# Patient Record
Sex: MALE | Race: WHITE | NOT HISPANIC OR LATINO | Employment: OTHER | URBAN - METROPOLITAN AREA
[De-identification: names, ages, dates, MRNs, and addresses within clinical notes are randomized per-mention and may not be internally consistent; named-entity substitution may affect disease eponyms.]

---

## 2017-02-21 ENCOUNTER — HISTORICAL (OUTPATIENT)
Dept: ADMINISTRATIVE | Facility: HOSPITAL | Age: 72
End: 2017-02-21

## 2017-02-21 LAB
ALBUMIN SERPL-MCNC: 4 G/DL (ref 3.1–4.7)
ALP SERPL-CCNC: 51 IU/L (ref 40–104)
ALT (SGPT): 27 IU/L (ref 3–33)
AST SERPL-CCNC: 29 IU/L (ref 10–40)
BASOPHILS NFR BLD: 0.1 K/UL (ref 0–0.2)
BASOPHILS NFR BLD: 1 %
BILIRUB SERPL-MCNC: 0.6 MG/DL (ref 0.3–1)
BUN SERPL-MCNC: 16 MG/DL (ref 8–20)
CALCIUM SERPL-MCNC: 9.5 MG/DL (ref 7.7–10.4)
CEA: 3 NG/ML
CHLORIDE: 105 MMOL/L (ref 98–110)
CO2 SERPL-SCNC: 23.4 MMOL/L (ref 22.8–31.6)
CREATININE: 1.22 MG/DL (ref 0.6–1.4)
EOSINOPHIL NFR BLD: 0.2 K/UL (ref 0–0.7)
EOSINOPHIL NFR BLD: 3.3 %
ERYTHROCYTE [DISTWIDTH] IN BLOOD BY AUTOMATED COUNT: 12.2 % (ref 11.7–14.9)
GLUCOSE: 229 MG/DL (ref 70–99)
GRAN #: 3.6 K/UL (ref 1.4–6.5)
GRAN%: 59.1 %
HCT VFR BLD AUTO: 34.6 % (ref 39–55)
HGB BLD-MCNC: 11.7 G/DL (ref 14–16)
IMMATURE GRANS (ABS): 0 K/UL (ref 0–1)
IMMATURE GRANULOCYTES: 0.7 %
LYMPH #: 1.8 K/UL (ref 1.2–3.4)
LYMPH%: 29.8 %
MCH RBC QN AUTO: 31.6 PG (ref 25–35)
MCHC RBC AUTO-ENTMCNC: 33.8 G/DL (ref 31–36)
MCV RBC AUTO: 93.5 FL (ref 80–100)
MONO #: 0.4 K/UL (ref 0.1–0.6)
MONO%: 6.1 %
NUCLEATED RBCS: 0 %
PLATELET # BLD AUTO: 289 K/UL (ref 140–440)
PMV BLD AUTO: 9.3 FL (ref 8.8–12.7)
POTASSIUM SERPL-SCNC: 3.7 MMOL/L (ref 3.5–5)
PROT SERPL-MCNC: 6.8 G/DL (ref 6–8.2)
RBC # BLD AUTO: 3.7 M/UL (ref 4.3–5.9)
SODIUM: 139 MMOL/L (ref 134–144)
WBC # BLD AUTO: 6.1 K/UL (ref 5–10)

## 2017-03-16 LAB
% SATURATION: 28 %
CEA: 3.9 NG/ML
COMPLEXED PSA SERPL-MCNC: <0 NG/ML (ref 0–3)
FERRITIN SERPL-MCNC: 109 NG/ML (ref 37–201)
IRON: 82 MCG/DL (ref 32–176)
TOTAL IRON BINDING CAPACITY: 293 MCG/DL (ref 177–435)
VITAMIN B12: 316 PG/ML (ref 62–940)

## 2017-04-04 ENCOUNTER — HISTORICAL (OUTPATIENT)
Dept: ADMINISTRATIVE | Facility: HOSPITAL | Age: 72
End: 2017-04-04

## 2017-07-31 LAB
% SATURATION: 18 %
ALBUMIN SERPL-MCNC: 4.2 G/DL (ref 3.1–4.7)
ALP SERPL-CCNC: 50 IU/L (ref 40–104)
ALT (SGPT): 27 IU/L (ref 3–33)
AST SERPL-CCNC: 26 IU/L (ref 10–40)
BASOPHILS NFR BLD: 0.1 K/UL (ref 0–0.2)
BASOPHILS NFR BLD: 0.7 %
BILIRUB SERPL-MCNC: 0.4 MG/DL (ref 0.3–1)
BUN SERPL-MCNC: 21 MG/DL (ref 8–20)
CALCIUM SERPL-MCNC: 9.7 MG/DL (ref 7.7–10.4)
CHLORIDE: 101 MMOL/L (ref 98–110)
CO2 SERPL-SCNC: 25.2 MMOL/L (ref 22.8–31.6)
CREATININE: 1.25 MG/DL (ref 0.6–1.4)
EOSINOPHIL NFR BLD: 0.3 K/UL (ref 0–0.7)
EOSINOPHIL NFR BLD: 4.9 %
ERYTHROCYTE [DISTWIDTH] IN BLOOD BY AUTOMATED COUNT: 11.9 % (ref 11.7–14.9)
FERRITIN SERPL-MCNC: 90 NG/ML (ref 37–201)
GLUCOSE: 148 MG/DL (ref 70–99)
GRAN #: 2.6 K/UL (ref 1.4–6.5)
GRAN%: 39.2 %
HCT VFR BLD AUTO: 35 % (ref 39–55)
HGB BLD-MCNC: 11.9 G/DL (ref 14–16)
IMMATURE GRANS (ABS): 0 K/UL (ref 0–1)
IMMATURE GRANULOCYTES: 0.3 %
IRON: 54 MCG/DL (ref 32–176)
LYMPH #: 3.1 K/UL (ref 1.2–3.4)
LYMPH%: 46.7 %
MCH RBC QN AUTO: 31.6 PG (ref 25–35)
MCHC RBC AUTO-ENTMCNC: 34 G/DL (ref 31–36)
MCV RBC AUTO: 92.8 FL (ref 80–100)
MONO #: 0.6 K/UL (ref 0.1–0.6)
MONO%: 8.2 %
NUCLEATED RBCS: 0 %
PLATELET # BLD AUTO: 268 K/UL (ref 140–440)
PMV BLD AUTO: 9.8 FL (ref 8.8–12.7)
POTASSIUM SERPL-SCNC: 4.3 MMOL/L (ref 3.5–5)
PROT SERPL-MCNC: 6.9 G/DL (ref 6–8.2)
RBC # BLD AUTO: 3.77 M/UL (ref 4.3–5.9)
SODIUM: 136 MMOL/L (ref 134–144)
TOTAL IRON BINDING CAPACITY: 296 MCG/DL (ref 177–435)
WBC # BLD AUTO: 6.7 K/UL (ref 5–10)

## 2017-08-08 ENCOUNTER — OFFICE VISIT (OUTPATIENT)
Dept: HEMATOLOGY/ONCOLOGY | Facility: CLINIC | Age: 72
End: 2017-08-08
Payer: MEDICARE

## 2017-08-08 VITALS
TEMPERATURE: 98 F | HEART RATE: 97 BPM | BODY MASS INDEX: 35.17 KG/M2 | DIASTOLIC BLOOD PRESSURE: 74 MMHG | RESPIRATION RATE: 18 BRPM | HEIGHT: 60 IN | SYSTOLIC BLOOD PRESSURE: 109 MMHG | WEIGHT: 179.13 LBS

## 2017-08-08 DIAGNOSIS — E53.8 B12 DEFICIENCY: ICD-10-CM

## 2017-08-08 DIAGNOSIS — D50.9 IRON DEFICIENCY ANEMIA, UNSPECIFIED: ICD-10-CM

## 2017-08-08 DIAGNOSIS — Z85.038 HISTORY OF COLON CANCER: Primary | ICD-10-CM

## 2017-08-08 DIAGNOSIS — Z85.46 HISTORY OF PROSTATE CANCER: ICD-10-CM

## 2017-08-08 DIAGNOSIS — D63.8 ANEMIA OF OTHER CHRONIC DISEASE: ICD-10-CM

## 2017-08-08 PROCEDURE — 3008F BODY MASS INDEX DOCD: CPT | Mod: ,,, | Performed by: INTERNAL MEDICINE

## 2017-08-08 PROCEDURE — 99214 OFFICE O/P EST MOD 30 MIN: CPT | Mod: ,,, | Performed by: INTERNAL MEDICINE

## 2017-08-08 PROCEDURE — 1126F AMNT PAIN NOTED NONE PRSNT: CPT | Mod: ,,, | Performed by: INTERNAL MEDICINE

## 2017-08-08 PROCEDURE — 1159F MED LIST DOCD IN RCRD: CPT | Mod: ,,, | Performed by: INTERNAL MEDICINE

## 2017-08-08 RX ORDER — ATORVASTATIN CALCIUM 20 MG/1
20 TABLET, FILM COATED ORAL NIGHTLY
COMMUNITY
Start: 2017-07-30 | End: 2021-06-06

## 2017-08-08 NOTE — LETTER
August 8, 2017      Pascual Avalos MD  [address]  [place] [address]           Formerly [facility]  [address]  [address]  [place] [address]  Phone: [phone]  Fax: [phone]          Patient: Bret Garcia   MR Number: [id number]   YOB: 1945   Date of Visit: 8/8/2017       Dear Dr. Pascual Avalos:    Thank you for referring Bret Garcia to me for evaluation. Attached you will find relevant portions of my assessment and plan of care.    If you have questions, please do not hesitate to call me. I look forward to following Bret Garcia along with you.    Sincerely,    Robert Bonilla MD    Enclosure  CC:  No Recipients    If you would like to receive this communication electronically, please contact [email or web address] or [phone] to request more information on [employer] Link access.    For providers and/or their staff who would like to refer a patient to [facility], please contact us through our one-stop-shop provider referral line, [facility], at [phone].    If you feel you have received this communication in error or would no longer like to receive these types of communications, please e-mail [email or web address]

## 2017-08-08 NOTE — PROGRESS NOTES
[facility] Hematology/Oncology  PROGRESS NOTE      Subjective:       Patient ID:   NAME: Bret Garcia : 1945     71 y.o. male    Referring Doc: Pascual Avalos MD  Other Physicians: Richard, Carlitos Brown    Chief Complaint:  Colon ca f/u    History of Present Illness:     Patient returns today for a regularly scheduled follow-up visit.  The patient is doing ok. No new issues. He did not get the B12 or CEA levels done this past time because of costs. He denies any CP, SOB, HA's or N/V. Bowels normal.             ROS:   GEN: normal without any fever, night sweats or weight loss  HEENT: normal with no HA's, sore throat, stiff neck, changes in vision  CV: normal with no CP, SOB, PND, THURMAN or orthopnea  PULM: normal with no SOB, cough, hemoptysis, sputum or pleuritic pain  GI: normal with no abdominal pain, nausea, vomiting, constipation, diarrhea, melanotic stools, BRBPR, or hematemesis  : normal with no hematuria, dysuria  BREAST: normal with no mass, discharge, pain  SKIN: normal with no rash, erythema, bruising, or swelling    Allergies:  Review of patient's allergies indicates:   Allergen Reactions    Codeine Other (See Comments)     Other reaction(s): Rash  hallucinations    Penicillins Other (See Comments)     Other reaction(s): Shortness of breath  Other reaction(s): Itching  Unknown/as a child       Medications:    Current Outpatient Prescriptions:     atorvastatin (LIPITOR) 20 MG tablet, , Disp: , Rfl:     benazepril (LOTENSIN) 40 MG tablet, Take 40 mg by mouth once daily. , Disp: , Rfl:     BYDUREON 2 mg SSRR, Inject 2 mGy as directed once a week. , Disp: , Rfl: 1    clindamycin phosphate foam, APPLY 1 GM ON THE SKIN TWICE A DAY, Disp: , Rfl: 5    duloxetine (CYMBALTA) 30 MG capsule, Take 30 mg by mouth every morning. , Disp: , Rfl:     duloxetine (CYMBALTA) 60 MG capsule, Take 60 mg by mouth every evening.  , Disp: , Rfl:     levothyroxine (SYNTHROID) 75 MCG tablet, Take 1 tablet by mouth.,  "Disp: , Rfl:     metformin (GLUCOPHAGE-XR) 500 MG 24 hr tablet, TAKE 3 TABLETS BY MOUTH WITH DINNER EVERY DAY, Disp: , Rfl: 1    olanzapine (ZYPREXA) 5 MG tablet, Take 1 tablet by mouth., Disp: , Rfl:     omeprazole (PRILOSEC) 40 MG capsule, Take 40 mg by mouth once daily., Disp: , Rfl: 1    polyethylene glycol (GLYCOLAX) 17 gram/dose powder, TAKE 17 G EVERY DAY BY ORAL ROUTE FOR 30 DAYS., Disp: , Rfl: 3    pregabalin (LYRICA) 75 MG capsule, Take 75 mg by mouth every evening. , Disp: , Rfl:     trospium (SANCTURA XR) 60 mg Cp24 capsule, TAKE 1 CAPSULE BY MOUTH ONCE EVERY DAY, Disp: 90 capsule, Rfl: 0    esomeprazole (NEXIUM) 40 MG capsule, Take 40 mg by mouth once daily. , Disp: , Rfl:     ezetimibe (ZETIA) 10 mg tablet, Take 1 tablet by mouth., Disp: , Rfl:     ICAR-C 100-250 mg Tab, Take 1 tablet by mouth once daily., Disp: , Rfl: 3    simvastatin (ZOCOR) 40 MG tablet, Take by mouth. 1 Tablet Oral Every day, Disp: , Rfl:     PMHx/PSHx Updates:  See patient's last visit with me on 4/11/2017.  See H&P on Vol #1        Pathology:  See vol #1        Objective:     Vitals:  Blood pressure 109/74, pulse 97, temperature 98.4 °F (36.9 °C), resp. rate 18, height 5' 0.3" (1.532 m), weight 81.2 kg (179 lb 1.6 oz).    Physical Examination:   GEN: no apparent distress, comfortable; AAOx3  HEAD: atraumatic and normocephalic  EYES: no pallor, no icterus, PERRLA  ENT: OMM, no pharyngeal erythema, external ears WNL; no nasal discharge; no thrush  NECK: no masses, thyroid normal, trachea midline, no LAD/LN's, supple  CV: RRR with no murmur; normal pulse; normal S1 and S2; no pedal edema  CHEST: Normal respiratory effort; CTAB; normal breath sounds; no wheeze or crackles  ABDOM: nontender and nondistended; soft; normal bowel sounds; no rebound/guarding  MUSC/Skeletal: ROM normal; no crepitus; joints normal; no deformities or arthropathy  EXTREM: no clubbing, cyanosis, inflammation or swelling  SKIN: no rashes, lesions, " ulcers, petechiae or subcutaneous nodules  : no lawson  NEURO: grossly intact; motor/sensory WNL; AAOx3; no tremors  PSYCH: normal mood, affect and behavior  LYMPH: normal cervical, supraclavicular, axillary and groin LN's            Labs:     7/31/2017      Radiology/Diagnostic Studies:    No results found.    I have reviewed all available lab results and radiology reports.    Assessment/Plan:   (1) 71 y.o. male with diagnosis of colon cancer in 2005  - followed by Dr Ramos with GI  - prior partial SBO in Nov 2016      (2) Prior pseudotumor of lung - s/p resection   - followed by Dr Brown with Pulm    (3) Prior prostate CA - followed by Dr Urbina with     (4) Steatosis of liver with chronic LFT elevations - also followed by Dr Ramos with GI    (5) Multifactorial mild anemia - chronic disease, chronic renal and iron deficiency components  - latest hgb adequate at 11.9 and stable  - iron and ferritin good    (6) Mild B12 deficiency - on OTC B12    (7) CRI - followed by Dr Patton        PLAN:  1. Check CXR  2. Check CEA in Sept 2017  3. F/U with PCP, GI, Pulm and   4. RTC in 6 months  Fax note to Pascual Avalos MD, Pooja, Kevin Urbina, Richard    I spent over 25 mins with the patient, of which over half was face to face with the patient. Reviewing materials, labs, reports and studies. Making treatment and analytical decisions. Ordering necessary labs, tests and studies.      Discussion:     I have explained all of the above in detail and the patient understands all of the current recommendation(s). I have answered all of their questions to the best of my ability and to their complete satisfaction.   The patient is to continue with the current management plan.            Electronically signed by Robert Bonilla MD

## 2017-08-30 ENCOUNTER — TELEPHONE (OUTPATIENT)
Dept: UROLOGY | Facility: CLINIC | Age: 72
End: 2017-08-30

## 2017-08-30 DIAGNOSIS — Z80.42 FAMILY HX OF PROSTATE CANCER: Primary | ICD-10-CM

## 2017-08-30 DIAGNOSIS — C61 PROSTATE CANCER: ICD-10-CM

## 2017-08-30 NOTE — TELEPHONE ENCOUNTER
----- Message from Leslie Lovell sent at 8/30/2017  1:46 PM CDT -----  Contact: patient  Patient calling to request a lab order for a PSA to be put in. He is scheduled for his annual on 10/2/17. Please advise.  Call back .  Thanks!

## 2017-09-26 ENCOUNTER — LAB VISIT (OUTPATIENT)
Dept: LAB | Facility: HOSPITAL | Age: 72
End: 2017-09-26
Attending: UROLOGY
Payer: MEDICARE

## 2017-09-26 DIAGNOSIS — Z80.42 FAMILY HX OF PROSTATE CANCER: ICD-10-CM

## 2017-09-26 DIAGNOSIS — C61 PROSTATE CANCER: ICD-10-CM

## 2017-09-26 LAB — COMPLEXED PSA SERPL-MCNC: <0.01 NG/ML

## 2017-09-26 PROCEDURE — 36415 COLL VENOUS BLD VENIPUNCTURE: CPT

## 2017-09-26 PROCEDURE — 84153 ASSAY OF PSA TOTAL: CPT

## 2017-10-02 ENCOUNTER — OFFICE VISIT (OUTPATIENT)
Dept: UROLOGY | Facility: CLINIC | Age: 72
End: 2017-10-02
Payer: MEDICARE

## 2017-10-02 VITALS
TEMPERATURE: 98 F | WEIGHT: 176 LBS | BODY MASS INDEX: 30.05 KG/M2 | HEART RATE: 108 BPM | HEIGHT: 64 IN | SYSTOLIC BLOOD PRESSURE: 128 MMHG | DIASTOLIC BLOOD PRESSURE: 87 MMHG

## 2017-10-02 DIAGNOSIS — C61 PROSTATE CANCER: Primary | ICD-10-CM

## 2017-10-02 DIAGNOSIS — N39.45 CONTINUOUS LEAKAGE OF URINE: ICD-10-CM

## 2017-10-02 LAB
BILIRUB SERPL-MCNC: NORMAL MG/DL
BLOOD URINE, POC: NORMAL
COLOR, POC UA: NORMAL
GLUCOSE UR QL STRIP: NORMAL
KETONES UR QL STRIP: NORMAL
LEUKOCYTE ESTERASE URINE, POC: NORMAL
NITRITE, POC UA: NORMAL
PH, POC UA: 6
PROTEIN, POC: NORMAL
SPECIFIC GRAVITY, POC UA: 1
UROBILINOGEN, POC UA: NORMAL

## 2017-10-02 PROCEDURE — 99214 OFFICE O/P EST MOD 30 MIN: CPT | Mod: S$PBB,,, | Performed by: UROLOGY

## 2017-10-02 PROCEDURE — 99999 PR PBB SHADOW E&M-EST. PATIENT-LVL III: CPT | Mod: PBBFAC,,, | Performed by: UROLOGY

## 2017-10-02 PROCEDURE — 99213 OFFICE O/P EST LOW 20 MIN: CPT | Mod: PBBFAC,PN | Performed by: UROLOGY

## 2017-10-02 PROCEDURE — 81002 URINALYSIS NONAUTO W/O SCOPE: CPT | Mod: PBBFAC,PN | Performed by: UROLOGY

## 2017-10-02 NOTE — PROGRESS NOTES
Subjective:       Patient ID: Bret Garcia is a 72 y.o. male.    Chief Complaint:   OFFICE NOTE    CHIEF COMPLAINT:  1.  Prostate cancer.  2.  Urinary incontinence.    Mr. Garcia is a 72-year-old male who underwent a radical retropubic   prostatectomy in 2006.  Since then, the PSA has been very well and the last PSA   on 09/26/2017 was 0.01.  He did develop urinary incontinence, and in 2011, he   underwent the insertion of an artificial urinary sphincter.  The patient after   the insertion of a urinary sphincter without activating the device has very   minimal urinary incontinence.  For that reason, we never activated the sphincter   and he was urinating fairly well with minimally stress incontinence.  It is   lately the patient has developed more urinary incontinence, and now he used 2-3   pads in a day.  He refers to have nocturia x2.  No dysuria or hematuria, and the   flow seemed to be adequate with the sensation that he can empty the bladder   satisfactory.  I explained to the patient that probably it is time to activate   the artificial urinary sphincter and hopefully that will give him complete   dryness.  I went ahead and did the activation and teach the patient the proper   use of the sphincter and the warning that he needs to be aware that no catheter   can be placed before opening the sphincter.  He understood and he was happy to   know that he will have a better bladder control now that the sphincter is   activated.    FAMILY HISTORY:  Negative for prostate cancer.    PAST MEDICAL AND SURGICAL HISTORY:  Have changed.  He was admitted to the   hospital earlier this year because of pain in an umbilical hernia area.  Several   tests was performed and no reason for the pain was found and he was eventually   discharged and the pain did resolve.  As noted before, the last PSA on   09/26/2017 was 0.01.  The urinalysis is completely clear.      EOR/HN  dd: 10/02/2017 16:42:22 (CDT)  td: 10/03/2017 01:47:05  (CDT)  Doc ID   #[id number]  Job ID #[id number]    CC:       HPI  Review of Systems   Constitutional: Negative for activity change and appetite change.   HENT: Negative.    Eyes: Negative for discharge.   Respiratory: Negative for cough and shortness of breath.    Cardiovascular: Negative for chest pain and palpitations.   Gastrointestinal: Negative for abdominal distention, abdominal pain, constipation and vomiting.   Genitourinary: Negative for discharge, dysuria, flank pain, frequency, hematuria, testicular pain and urgency.        Incontinence with the need of 2 to 3 pads a day.   Musculoskeletal: Negative for arthralgias.   Skin: Negative for rash.   Neurological: Negative for dizziness.   Psychiatric/Behavioral: The patient is not nervous/anxious.        Objective:      Physical Exam   Constitutional: He appears well-developed and well-nourished.   HENT:   Head: Normocephalic.   Eyes: Pupils are equal, round, and reactive to light.   Neck: Normal range of motion.   Cardiovascular: Normal rate.    Pulmonary/Chest: Effort normal.   Abdominal: Soft. He exhibits no distension and no mass. There is no tenderness. Hernia confirmed negative in the right inguinal area and confirmed negative in the left inguinal area.   Genitourinary: Rectum normal and penis normal. Rectal exam shows no external hemorrhoid, no mass and no tenderness. Right testis shows no mass and no tenderness. Left testis shows no mass and no tenderness. No discharge found.             Musculoskeletal: Normal range of motion.   Neurological: He is alert.   Skin: Skin is warm.     Psychiatric: He has a normal mood and affect.       Assessment:       1. Prostate cancer    2. Continuous leakage of urine        Plan:       Prostate cancer  -     POCT URINE DIPSTICK WITHOUT MICROSCOPE    Continuous leakage of urine    If no problems with the AUS RTC 1 yr.

## 2017-10-20 RX ORDER — TROSPIUM CHLORIDE ER 60 MG/1
60 CAPSULE ORAL DAILY
Qty: 90 CAPSULE | Refills: 3 | Status: SHIPPED | OUTPATIENT
Start: 2017-10-20 | End: 2018-01-09 | Stop reason: ALTCHOICE

## 2017-11-07 ENCOUNTER — HOSPITAL ENCOUNTER (INPATIENT)
Facility: HOSPITAL | Age: 72
LOS: 4 days | Discharge: HOME | DRG: 394 | End: 2017-11-12
Attending: INTERNAL MEDICINE | Admitting: INTERNAL MEDICINE
Payer: MEDICARE

## 2017-11-07 DIAGNOSIS — I10 HYPERTENSION, UNSPECIFIED TYPE: ICD-10-CM

## 2017-11-07 DIAGNOSIS — K56.609 SBO (SMALL BOWEL OBSTRUCTION): ICD-10-CM

## 2017-11-07 DIAGNOSIS — E11.8 TYPE 2 DIABETES MELLITUS WITH COMPLICATION, UNSPECIFIED LONG TERM INSULIN USE STATUS: ICD-10-CM

## 2017-11-07 DIAGNOSIS — Z85.46 HISTORY OF PROSTATE CANCER: ICD-10-CM

## 2017-11-07 DIAGNOSIS — Z87.19 HISTORY OF SMALL BOWEL OBSTRUCTION: ICD-10-CM

## 2017-11-07 PROBLEM — E11.9 DIABETES MELLITUS: Status: ACTIVE | Noted: 2017-11-07

## 2017-11-07 LAB
ALBUMIN SERPL BCP-MCNC: 3.8 G/DL
ALP SERPL-CCNC: 60 U/L
ALT SERPL W/O P-5'-P-CCNC: 15 U/L
AMYLASE SERPL-CCNC: 24 U/L
ANION GAP SERPL CALC-SCNC: 14 MMOL/L
AST SERPL-CCNC: 20 U/L
BASOPHILS # BLD AUTO: 0 K/UL
BASOPHILS NFR BLD: 0.2 %
BILIRUB SERPL-MCNC: 0.6 MG/DL
BILIRUB UR QL STRIP: NEGATIVE
BUN SERPL-MCNC: 40 MG/DL
CALCIUM SERPL-MCNC: 9.9 MG/DL
CHLORIDE SERPL-SCNC: 96 MMOL/L
CLARITY UR: CLEAR
CO2 SERPL-SCNC: 28 MMOL/L
COLOR UR: YELLOW
CREAT SERPL-MCNC: 1.8 MG/DL
DIFFERENTIAL METHOD: ABNORMAL
EOSINOPHIL # BLD AUTO: 0 K/UL
EOSINOPHIL NFR BLD: 0.3 %
ERYTHROCYTE [DISTWIDTH] IN BLOOD BY AUTOMATED COUNT: 12.9 %
EST. GFR  (AFRICAN AMERICAN): 43 ML/MIN/1.73 M^2
EST. GFR  (NON AFRICAN AMERICAN): 37 ML/MIN/1.73 M^2
GLUCOSE SERPL-MCNC: 154 MG/DL
GLUCOSE UR QL STRIP: NEGATIVE
HCT VFR BLD AUTO: 39.3 %
HGB BLD-MCNC: 13.2 G/DL
HGB UR QL STRIP: NEGATIVE
KETONES UR QL STRIP: NEGATIVE
LEUKOCYTE ESTERASE UR QL STRIP: NEGATIVE
LYMPHOCYTES # BLD AUTO: 1.4 K/UL
LYMPHOCYTES NFR BLD: 23.9 %
MAGNESIUM SERPL-MCNC: 1.9 MG/DL
MCH RBC QN AUTO: 31.3 PG
MCHC RBC AUTO-ENTMCNC: 33.6 G/DL
MCV RBC AUTO: 93 FL
MONOCYTES # BLD AUTO: 0.9 K/UL
MONOCYTES NFR BLD: 15.1 %
NEUTROPHILS # BLD AUTO: 3.7 K/UL
NEUTROPHILS NFR BLD: 60.5 %
NITRITE UR QL STRIP: NEGATIVE
PH UR STRIP: 6 [PH] (ref 5–8)
PHOSPHATE SERPL-MCNC: 3.5 MG/DL
PLATELET # BLD AUTO: 300 K/UL
PMV BLD AUTO: 8 FL
POCT GLUCOSE: 170 MG/DL (ref 70–110)
POTASSIUM SERPL-SCNC: 5 MMOL/L
PROT SERPL-MCNC: 7.5 G/DL
PROT UR QL STRIP: ABNORMAL
RBC # BLD AUTO: 4.22 M/UL
SODIUM SERPL-SCNC: 138 MMOL/L
SP GR UR STRIP: >=1.03 (ref 1–1.03)
URN SPEC COLLECT METH UR: ABNORMAL
UROBILINOGEN UR STRIP-ACNC: NEGATIVE EU/DL
WBC # BLD AUTO: 6.1 K/UL

## 2017-11-07 PROCEDURE — 96361 HYDRATE IV INFUSION ADD-ON: CPT

## 2017-11-07 PROCEDURE — G0379 DIRECT REFER HOSPITAL OBSERV: HCPCS

## 2017-11-07 PROCEDURE — 80053 COMPREHEN METABOLIC PANEL: CPT

## 2017-11-07 PROCEDURE — 85025 COMPLETE CBC W/AUTO DIFF WBC: CPT

## 2017-11-07 PROCEDURE — 99219 PR INITIAL OBSERVATION CARE,LEVL II: CPT | Mod: ,,, | Performed by: INTERNAL MEDICINE

## 2017-11-07 PROCEDURE — 81003 URINALYSIS AUTO W/O SCOPE: CPT

## 2017-11-07 PROCEDURE — 82962 GLUCOSE BLOOD TEST: CPT

## 2017-11-07 PROCEDURE — 82150 ASSAY OF AMYLASE: CPT

## 2017-11-07 PROCEDURE — G0378 HOSPITAL OBSERVATION PER HR: HCPCS

## 2017-11-07 PROCEDURE — 25000003 PHARM REV CODE 250: Performed by: INTERNAL MEDICINE

## 2017-11-07 PROCEDURE — 84100 ASSAY OF PHOSPHORUS: CPT

## 2017-11-07 PROCEDURE — 63600175 PHARM REV CODE 636 W HCPCS: Performed by: INTERNAL MEDICINE

## 2017-11-07 PROCEDURE — 96360 HYDRATION IV INFUSION INIT: CPT

## 2017-11-07 PROCEDURE — 36415 COLL VENOUS BLD VENIPUNCTURE: CPT

## 2017-11-07 PROCEDURE — 96372 THER/PROPH/DIAG INJ SC/IM: CPT

## 2017-11-07 PROCEDURE — 83735 ASSAY OF MAGNESIUM: CPT

## 2017-11-07 RX ORDER — LANOLIN ALCOHOL/MO/W.PET/CERES
800 CREAM (GRAM) TOPICAL
Status: DISCONTINUED | OUTPATIENT
Start: 2017-11-07 | End: 2017-11-12 | Stop reason: HOSPADM

## 2017-11-07 RX ORDER — ENOXAPARIN SODIUM 100 MG/ML
40 INJECTION SUBCUTANEOUS EVERY 24 HOURS
Status: DISCONTINUED | OUTPATIENT
Start: 2017-11-07 | End: 2017-11-12 | Stop reason: HOSPADM

## 2017-11-07 RX ORDER — OLANZAPINE 5 MG/1
5 TABLET ORAL DAILY
Status: DISCONTINUED | OUTPATIENT
Start: 2017-11-08 | End: 2017-11-12 | Stop reason: HOSPADM

## 2017-11-07 RX ORDER — POTASSIUM CHLORIDE 20 MEQ/15ML
40 SOLUTION ORAL
Status: DISCONTINUED | OUTPATIENT
Start: 2017-11-07 | End: 2017-11-12 | Stop reason: HOSPADM

## 2017-11-07 RX ORDER — GLUCAGON 1 MG
1 KIT INJECTION
Status: DISCONTINUED | OUTPATIENT
Start: 2017-11-07 | End: 2017-11-07 | Stop reason: SDUPTHER

## 2017-11-07 RX ORDER — SODIUM,POTASSIUM PHOSPHATES 280-250MG
2 POWDER IN PACKET (EA) ORAL
Status: DISCONTINUED | OUTPATIENT
Start: 2017-11-07 | End: 2017-11-12 | Stop reason: HOSPADM

## 2017-11-07 RX ORDER — DULOXETIN HYDROCHLORIDE 30 MG/1
60 CAPSULE, DELAYED RELEASE ORAL NIGHTLY
Status: DISCONTINUED | OUTPATIENT
Start: 2017-11-07 | End: 2017-11-12 | Stop reason: HOSPADM

## 2017-11-07 RX ORDER — ONDANSETRON 2 MG/ML
8 INJECTION INTRAMUSCULAR; INTRAVENOUS EVERY 6 HOURS PRN
Status: DISCONTINUED | OUTPATIENT
Start: 2017-11-07 | End: 2017-11-12 | Stop reason: HOSPADM

## 2017-11-07 RX ORDER — PANTOPRAZOLE SODIUM 40 MG/10ML
40 INJECTION, POWDER, LYOPHILIZED, FOR SOLUTION INTRAVENOUS DAILY
Status: DISCONTINUED | OUTPATIENT
Start: 2017-11-08 | End: 2017-11-11

## 2017-11-07 RX ORDER — SODIUM CHLORIDE 450 MG/100ML
INJECTION, SOLUTION INTRAVENOUS CONTINUOUS
Status: DISCONTINUED | OUTPATIENT
Start: 2017-11-07 | End: 2017-11-10

## 2017-11-07 RX ORDER — INSULIN ASPART 100 [IU]/ML
0-5 INJECTION, SOLUTION INTRAVENOUS; SUBCUTANEOUS EVERY 6 HOURS PRN
Status: DISCONTINUED | OUTPATIENT
Start: 2017-11-07 | End: 2017-11-12 | Stop reason: HOSPADM

## 2017-11-07 RX ORDER — IBUPROFEN 200 MG
16 TABLET ORAL
Status: DISCONTINUED | OUTPATIENT
Start: 2017-11-07 | End: 2017-11-12 | Stop reason: HOSPADM

## 2017-11-07 RX ORDER — IBUPROFEN 200 MG
24 TABLET ORAL
Status: DISCONTINUED | OUTPATIENT
Start: 2017-11-07 | End: 2017-11-12 | Stop reason: HOSPADM

## 2017-11-07 RX ORDER — ACETAMINOPHEN 325 MG/1
650 TABLET ORAL EVERY 8 HOURS PRN
Status: DISCONTINUED | OUTPATIENT
Start: 2017-11-07 | End: 2017-11-12 | Stop reason: HOSPADM

## 2017-11-07 RX ORDER — POTASSIUM CHLORIDE 20 MEQ/15ML
60 SOLUTION ORAL
Status: DISCONTINUED | OUTPATIENT
Start: 2017-11-07 | End: 2017-11-12 | Stop reason: HOSPADM

## 2017-11-07 RX ORDER — BISACODYL 10 MG
10 SUPPOSITORY, RECTAL RECTAL DAILY PRN
Status: DISCONTINUED | OUTPATIENT
Start: 2017-11-07 | End: 2017-11-12 | Stop reason: HOSPADM

## 2017-11-07 RX ORDER — GLUCAGON 1 MG
1 KIT INJECTION
Status: DISCONTINUED | OUTPATIENT
Start: 2017-11-07 | End: 2017-11-12 | Stop reason: HOSPADM

## 2017-11-07 RX ORDER — OXYBUTYNIN CHLORIDE 5 MG/1
5 TABLET, EXTENDED RELEASE ORAL DAILY
Status: DISCONTINUED | OUTPATIENT
Start: 2017-11-08 | End: 2017-11-12 | Stop reason: HOSPADM

## 2017-11-07 RX ADMIN — ENOXAPARIN SODIUM 40 MG: 100 INJECTION SUBCUTANEOUS at 07:11

## 2017-11-07 RX ADMIN — DULOXETINE HYDROCHLORIDE 60 MG: 30 CAPSULE, DELAYED RELEASE ORAL at 09:11

## 2017-11-07 RX ADMIN — SODIUM CHLORIDE: 0.45 INJECTION, SOLUTION INTRAVENOUS at 07:11

## 2017-11-07 NOTE — H&P
PCP: Pascual Avalos MD    History & Physical    Chief Complaint: SBO    History of Present Illness:  Patient is a 72 y.o. male admitted to [facility] from [facility] with complaint of SBO. Patient reportedly has past medical history significant for hypertension, hyperlipidemia, GERD, DM-2, colon polyp and history of Prostate and colon cancer s/p colectomy (2005). Patient also has prior history of SBO (). Last colonoscopy by Dr. Ramos in . Patient presented with 2 day history of abdominal distention, associated nausea and vomited twice. Patient denied any abdominal pain, hematemesis or melena. Last BM was yesterday. Patient denied chest pain, shortness of breath, headache, vision changes, focal neuro-deficits, cough or fever.    Past Medical History:   Diagnosis Date    Colon polyp     Diabetes mellitus     Elevated PSA     GERD (gastroesophageal reflux disease)     Hyperlipidemia     Hypertension     Incontinence of urine     Neuropathy     Personal history of malignant neoplasm of large intestine     colon; prostate    Personal history of malignant neoplasm of prostate      Past Surgical History:   Procedure Laterality Date    APPENDECTOMY      COLON SURGERY  2005    resection    PROSTATE SURGERY  2006    prostatectomy    ROTATOR CUFF REPAIR      Rt shoulder    TONSILLECTOMY, ADENOIDECTOMY      as a baby     Family History   Problem Relation Age of Onset    Heart disease Father     Alcohol abuse Father      Social History   Substance Use Topics    Smoking status: Never Smoker    Smokeless tobacco: Never Used    Alcohol use No      Review of patient's allergies indicates:   Allergen Reactions    Codeine Other (See Comments)     Other reaction(s): Rash  hallucinations    Penicillins Other (See Comments)     Other reaction(s): Shortness of breath  Other reaction(s): Itching  Unknown/as a child     PTA Medications   Medication Sig    atorvastatin  (LIPITOR) 20 MG tablet     benazepril (LOTENSIN) 40 MG tablet Take 40 mg by mouth once daily.     BYDUREON 2 mg SSRR Inject 2 mGy as directed once a week.     duloxetine (CYMBALTA) 30 MG capsule Take 30 mg by mouth every morning.     duloxetine (CYMBALTA) 60 MG capsule Take 60 mg by mouth every evening.      esomeprazole (NEXIUM) 40 MG capsule Take 40 mg by mouth once daily.     levothyroxine (SYNTHROID) 75 MCG tablet Take 1 tablet by mouth.    metformin (GLUCOPHAGE-XR) 500 MG 24 hr tablet TAKE 3 TABLETS BY MOUTH WITH DINNER EVERY DAY    olanzapine (ZYPREXA) 5 MG tablet Take 1 tablet by mouth.    omeprazole (PRILOSEC) 40 MG capsule Take 40 mg by mouth once daily.    pregabalin (LYRICA) 75 MG capsule Take 75 mg by mouth every evening.     trospium (SANCTURA XR) 60 mg Cp24 capsule Take 1 capsule (60 mg total) by mouth once daily.    clindamycin phosphate foam APPLY 1 GM ON THE SKIN TWICE A DAY    ezetimibe (ZETIA) 10 mg tablet Take 1 tablet by mouth.    ICAR-C 100-250 mg Tab Take 1 tablet by mouth once daily.    polyethylene glycol (GLYCOLAX) 17 gram/dose powder TAKE 17 G EVERY DAY BY ORAL ROUTE FOR 30 DAYS.    simvastatin (ZOCOR) 40 MG tablet Take by mouth. 1 Tablet Oral Every day     Review of Systems:  Constitutional: no fever or chills  Eyes: no visual changes  Ears, nose, mouth, throat, and face: no nasal congestion or sore throat  Respiratory: no cough or shorness of breath  Cardiovascular: no chest pain or palpitations  Gastrointestinal: see HPI  Genitourinary: no hematuria or dysuria  Integument/breast: no rash or pruritis  Hematologic/lymphatic: no easy bruising or lymphadenopathy  Musculoskeletal: no arthralgias or myalgias  Neurological: no seizures or tremors.  Behavioral/Psych: no auditory or visual hallucinations  Endocrine: no heat or cold intolerance     OBJECTIVE:     Vital Signs (Most Recent)  Temp: 97.8 °F (36.6 °C) (11/07/17 1515)  Pulse: (!) 116 (11/07/17 1515)  Resp: 18 (11/07/17  1515)  BP: 110/80 (11/07/17 1515)  SpO2: (!) 94 % (11/07/17 1515)    Physical Exam:  General appearance: well developed, appears stated age  Head: normocephalic, atraumatic  Eyes:  conjunctivae/corneas clear. PERRL.  Nose: Nares normal. Septum midline.  Throat: lips, mucosa, and tongue normal; teeth and gums normal, no throat erythema.  Neck: supple, symmetrical, trachea midline, no JVD and thyroid not enlarged, symmetric, no tenderness/mass/nodules  Lungs:  clear to auscultation bilaterally and normal respiratory effort  Chest wall: no tenderness  Heart: regular rate and rhythm, S1, S2 normal, no murmur, click, rub or gallop  Abdomen: soft, non-tender, distented; bowel sounds hypoactive; no masses,  no organomegaly  Extremities: no cyanosis, clubbing or edema.   Pulses: 2+ and symmetric  Skin: Skin color, texture, turgor normal. No rashes or lesions.  Lymph nodes: Cervical, supraclavicular, and axillary nodes normal.  Neurologic: Normal strength and tone. No focal numbness or weakness. CNII-XII intact.      Laboratory:   CBC: No results for input(s): WBC, RBC, HGB, HCT, PLT, MCV, MCH, MCHC in the last 168 hours.  CMP: No results for input(s): GLU, CALCIUM, ALBUMIN, PROT, NA, K, CO2, CL, BUN, CREATININE, ALKPHOS, ALT, AST, BILITOT in the last 168 hours.  Coagulation: No results for input(s): LABPROT, INR, APTT in the last 168 hours.  Diagnostic Results:  Chest X-Ray:     Assessment/Plan:     Active Hospital Problems    Diagnosis  POA    *SBO (small bowel obstruction) [K56.609]  History of small bowel obstruction [Z87.19]  Patient deferred NGT with LIS.  Repeat KUB x-ray in AM.  Consult General Surgeon specialist.  Continue IVF hydration. Follow serum lytes.   Use IV anti-emetics as needed.      Yes    Diabetes mellitus 2 [E11.9]  Check blood glucose level q A6h  Use Novolog Insulin Sliding Scale as needed.     Yes    Hypertension [I10]  Chronic problem. Will continue chronic medications and monitor for any  changes, adjusting as needed.    Yes          History of prostate cancer [Z85.46]  Not Applicable    History of colon cancer [Z85.038]  Under care by Dr. Bonilla.   Yes        VTE Risk Mitigation         Ordered     enoxaparin injection 40 mg  Daily     Route:  Subcutaneous        11/07/17 1644     Medium Risk of VTE  Once      11/07/17 1644        Jaimee Franks MD  Department of Hospital Medicine   [facility]

## 2017-11-08 PROBLEM — N17.9 AKI (ACUTE KIDNEY INJURY): Status: ACTIVE | Noted: 2017-11-08

## 2017-11-08 LAB
BASOPHILS # BLD AUTO: 0 K/UL
BASOPHILS NFR BLD: 0.3 %
DIFFERENTIAL METHOD: ABNORMAL
EOSINOPHIL # BLD AUTO: 0 K/UL
EOSINOPHIL NFR BLD: 0.7 %
ERYTHROCYTE [DISTWIDTH] IN BLOOD BY AUTOMATED COUNT: 13.1 %
HCT VFR BLD AUTO: 35.1 %
HGB BLD-MCNC: 11.9 G/DL
LIPASE SERPL-CCNC: 25 U/L
LYMPHOCYTES # BLD AUTO: 1.8 K/UL
LYMPHOCYTES NFR BLD: 33.9 %
MCH RBC QN AUTO: 31.4 PG
MCHC RBC AUTO-ENTMCNC: 34 G/DL
MCV RBC AUTO: 92 FL
MONOCYTES # BLD AUTO: 0.8 K/UL
MONOCYTES NFR BLD: 14 %
NEUTROPHILS # BLD AUTO: 2.8 K/UL
NEUTROPHILS NFR BLD: 51.1 %
PLATELET # BLD AUTO: 279 K/UL
PMV BLD AUTO: 7.9 FL
POCT GLUCOSE: 118 MG/DL (ref 70–110)
POCT GLUCOSE: 155 MG/DL (ref 70–110)
RBC # BLD AUTO: 3.8 M/UL
WBC # BLD AUTO: 5.4 K/UL

## 2017-11-08 PROCEDURE — 99223 1ST HOSP IP/OBS HIGH 75: CPT | Mod: ,,, | Performed by: SURGERY

## 2017-11-08 PROCEDURE — 83690 ASSAY OF LIPASE: CPT

## 2017-11-08 PROCEDURE — 63600175 PHARM REV CODE 636 W HCPCS: Performed by: INTERNAL MEDICINE

## 2017-11-08 PROCEDURE — C9113 INJ PANTOPRAZOLE SODIUM, VIA: HCPCS | Performed by: INTERNAL MEDICINE

## 2017-11-08 PROCEDURE — 25000003 PHARM REV CODE 250: Performed by: INTERNAL MEDICINE

## 2017-11-08 PROCEDURE — 85025 COMPLETE CBC W/AUTO DIFF WBC: CPT

## 2017-11-08 PROCEDURE — 25500020 PHARM REV CODE 255: Performed by: INTERNAL MEDICINE

## 2017-11-08 PROCEDURE — 99232 SBSQ HOSP IP/OBS MODERATE 35: CPT | Mod: ,,, | Performed by: INTERNAL MEDICINE

## 2017-11-08 PROCEDURE — 12000002 HC ACUTE/MED SURGE SEMI-PRIVATE ROOM

## 2017-11-08 PROCEDURE — 96374 THER/PROPH/DIAG INJ IV PUSH: CPT

## 2017-11-08 PROCEDURE — 36415 COLL VENOUS BLD VENIPUNCTURE: CPT

## 2017-11-08 RX ADMIN — SODIUM CHLORIDE: 0.45 INJECTION, SOLUTION INTRAVENOUS at 04:11

## 2017-11-08 RX ADMIN — OXYBUTYNIN CHLORIDE 5 MG: 5 TABLET, EXTENDED RELEASE ORAL at 08:11

## 2017-11-08 RX ADMIN — OLANZAPINE 5 MG: 5 TABLET, FILM COATED ORAL at 08:11

## 2017-11-08 RX ADMIN — DULOXETINE HYDROCHLORIDE 60 MG: 30 CAPSULE, DELAYED RELEASE ORAL at 09:11

## 2017-11-08 RX ADMIN — ENOXAPARIN SODIUM 40 MG: 100 INJECTION SUBCUTANEOUS at 04:11

## 2017-11-08 RX ADMIN — PANTOPRAZOLE SODIUM 40 MG: 40 INJECTION, POWDER, FOR SOLUTION INTRAVENOUS at 08:11

## 2017-11-08 RX ADMIN — IOHEXOL 30 ML: 350 INJECTION, SOLUTION INTRAVENOUS at 10:11

## 2017-11-08 NOTE — PLAN OF CARE
Problem: Patient Care Overview  Goal: Plan of Care Review  Outcome: Ongoing (interventions implemented as appropriate)  Pt alert and oriented. NAD noted.NPO. VS obtained. 0.4NS @ 100 ml/hr.urinal at bedside. No BM this shift. BG monitored q6hrs. Up at all. Denies pain, n/v. Free of fall or injury. Call light in reach. Will continue to monitor.

## 2017-11-08 NOTE — SUBJECTIVE & OBJECTIVE
No current facility-administered medications on file prior to encounter.      Current Outpatient Prescriptions on File Prior to Encounter   Medication Sig    atorvastatin (LIPITOR) 20 MG tablet     benazepril (LOTENSIN) 40 MG tablet Take 40 mg by mouth once daily.     BYDUREON 2 mg SSRR Inject 2 mGy as directed once a week.     duloxetine (CYMBALTA) 30 MG capsule Take 30 mg by mouth every morning.     duloxetine (CYMBALTA) 60 MG capsule Take 60 mg by mouth every evening.      esomeprazole (NEXIUM) 40 MG capsule Take 40 mg by mouth once daily.     levothyroxine (SYNTHROID) 75 MCG tablet Take 1 tablet by mouth.    metformin (GLUCOPHAGE-XR) 500 MG 24 hr tablet TAKE 3 TABLETS BY MOUTH WITH DINNER EVERY DAY    olanzapine (ZYPREXA) 5 MG tablet Take 1 tablet by mouth.    omeprazole (PRILOSEC) 40 MG capsule Take 40 mg by mouth once daily.    pregabalin (LYRICA) 75 MG capsule Take 75 mg by mouth every evening.     trospium (SANCTURA XR) 60 mg Cp24 capsule Take 1 capsule (60 mg total) by mouth once daily.    clindamycin phosphate foam APPLY 1 GM ON THE SKIN TWICE A DAY    ezetimibe (ZETIA) 10 mg tablet Take 1 tablet by mouth.    ICAR-C 100-250 mg Tab Take 1 tablet by mouth once daily.    polyethylene glycol (GLYCOLAX) 17 gram/dose powder TAKE 17 G EVERY DAY BY ORAL ROUTE FOR 30 DAYS.    simvastatin (ZOCOR) 40 MG tablet Take by mouth. 1 Tablet Oral Every day       Review of patient's allergies indicates:   Allergen Reactions    Codeine Other (See Comments)     Other reaction(s): Rash  hallucinations    Penicillins Other (See Comments)     Other reaction(s): Shortness of breath  Other reaction(s): Itching  Unknown/as a child       Past Medical History:   Diagnosis Date    Colon polyp     Diabetes mellitus     Elevated PSA     GERD (gastroesophageal reflux disease)     Hyperlipidemia     Hypertension     Incontinence of urine     Neuropathy     Personal history of malignant neoplasm of large intestine      colon; prostate    Personal history of malignant neoplasm of prostate      Past Surgical History:   Procedure Laterality Date    APPENDECTOMY      COLON SURGERY  2005    resection    PROSTATE SURGERY  2006    prostatectomy    ROTATOR CUFF REPAIR      Rt shoulder    TONSILLECTOMY, ADENOIDECTOMY      as a baby     Family History     Problem Relation (Age of Onset)    Alcohol abuse Father    Heart disease Father        Social History Main Topics    Smoking status: Never Smoker    Smokeless tobacco: Never Used    Alcohol use No    Drug use: Unknown    Sexual activity: Not on file     Review of Systems   Constitutional: Positive for appetite change. Negative for activity change, fatigue and fever.   HENT: Negative for congestion and sore throat.    Eyes: Negative for redness and visual disturbance.   Respiratory: Negative for cough and choking.    Cardiovascular: Negative for chest pain and palpitations.   Gastrointestinal: Positive for abdominal distention, abdominal pain, constipation, nausea and vomiting. Negative for anal bleeding, blood in stool, diarrhea and rectal pain.   Endocrine: Negative for polydipsia and polyphagia.   Genitourinary: Negative for difficulty urinating and dysuria.   Musculoskeletal: Negative for arthralgias and back pain.   Skin: Negative for rash and wound.   Neurological: Negative for light-headedness and headaches.   Hematological: Negative for adenopathy. Does not bruise/bleed easily.   Psychiatric/Behavioral: Negative for agitation and confusion.     Objective:     Vital Signs (Most Recent):  Temp: 97.8 °F (36.6 °C) (11/08/17 0713)  Pulse: 100 (11/08/17 0713)  Resp: 18 (11/08/17 0713)  BP: 115/73 (11/08/17 0713)  SpO2: (!) 92 % (11/08/17 0713) Vital Signs (24h Range):  Temp:  [96.2 °F (35.7 °C)-98.2 °F (36.8 °C)] 97.8 °F (36.6 °C)  Pulse:  [100-116] 100  Resp:  [18] 18  SpO2:  [92 %-99 %] 92 %  BP: (107-188)/(66-82) 115/73     Weight: 78 kg (172 lb)  Body mass index is  30.47 kg/m².    Physical Exam   Constitutional: He is oriented to person, place, and time. He appears well-nourished. No distress.   HENT:   Head: Atraumatic.   Eyes: Conjunctivae and EOM are normal. Pupils are equal, round, and reactive to light. No scleral icterus.   Neck: Normal range of motion. Neck supple. No JVD present. No tracheal deviation present. No thyromegaly present.   Cardiovascular: Normal rate, regular rhythm and normal heart sounds.  Exam reveals no gallop and no friction rub.    No murmur heard.  Pulmonary/Chest: Effort normal and breath sounds normal. No respiratory distress. He has no wheezes. He has no rales. He exhibits no tenderness.   Abdominal: Soft. Bowel sounds are normal. He exhibits distension. He exhibits no mass. There is no tenderness. There is no rebound and no guarding. A hernia is present.   Midline incision healed with reducible hernia   Musculoskeletal: Normal range of motion. He exhibits no edema or tenderness.   Neurological: He is alert and oriented to person, place, and time. No cranial nerve deficit.   Skin: Skin is warm and dry. He is not diaphoretic.   Psychiatric: He has a normal mood and affect.   Nursing note and vitals reviewed.      Significant Labs:  CBC:   Recent Labs  Lab 11/08/17  0416   WBC 5.40   RBC 3.80*   HGB 11.9*   HCT 35.1*      MCV 92   MCH 31.4*   MCHC 34.0     CMP:   Recent Labs  Lab 11/07/17  1747   *   CALCIUM 9.9   ALBUMIN 3.8   PROT 7.5      K 5.0   CO2 28   CL 96   BUN 40*   CREATININE 1.8*   ALKPHOS 60   ALT 15   AST 20   BILITOT 0.6       Significant Diagnostics:  I have reviewed and interpreted all pertinent imaging results/findings within the past 24 hours.  Abdominal xray with some dilated loops of small bowel and possibly dilated stomach.

## 2017-11-08 NOTE — PLAN OF CARE
PCP is Dr Avalos.  Pharmacy is [facility] on [address].  Lives with spouse; independent with ADL's.  D/c plan is home; no needs.       11/08/17 1412   Discharge Assessment   Assessment Type Discharge Planning Assessment   Confirmed/corrected address and phone number on facesheet? Yes   Assessment information obtained from? Patient   Prior to hospitilization cognitive status: Alert/Oriented   Prior to hospitalization functional status: Independent   Current cognitive status: Alert/Oriented   Current Functional Status: Independent   Lives With spouse   Able to Return to Prior Arrangements yes   Is patient able to care for self after discharge? Yes   Patient's perception of discharge disposition home or selfcare   Readmission Within The Last 30 Days no previous admission in last 30 days   Patient currently being followed by outpatient case management? No   Patient currently receives any other outside agency services? No   Equipment Currently Used at Home none   Do you have any problems affording any of your prescribed medications? No   Is the patient taking medications as prescribed? yes   Does the patient have transportation home? Yes   Transportation Available family or friend will provide   Does the patient receive services at the [facility]? No   Discharge Plan A Home   Patient/Family In Agreement With Plan yes

## 2017-11-08 NOTE — PLAN OF CARE
I completed the assessment with the pts son, Jose Manuel Mai [phone]. The number listed on the face sheet as the pts number is actually Mr. Richard's wife. They live in [place] and he is an only child. He added a family friend of Shiraz Pina [phone] to the emergency contacts. Mr. Pina brought the pt to [facility]. The pt has CM services from [facility] and has a home walker, WC, scooter and glucometer. He has lived at [facility] for 2 years. The correct address is [address] [place] [address]. However the pts son, Jose Manuel stated that he handles the pts mail and correspondence should go to his address. The pt sees Dr. Quintero and has [employer] insurance. The discharge plan is for the pt to return to [facility]. Elizabeth Hinds LMSW     11/08/17 1104   Discharge Assessment   Assessment Type Discharge Planning Assessment   Confirmed/corrected address and phone number on facesheet? Yes   Assessment information obtained from? Caregiver;Other   Communicated expected length of stay with patient/caregiver no   Prior to hospitilization cognitive status: Unable to Assess   Prior to hospitalization functional status: Assistive Equipment   Current cognitive status: Unable to Assess   Current Functional Status: Assistive Equipment   Facility Arrived From: [facility] [phone]   Lives With facility resident   Able to Return to Prior Arrangements yes   Is patient able to care for self after discharge? No   Readmission Within The Last 30 Days no previous admission in last 30 days   Patient currently being followed by outpatient case management? Yes   If yes, name of outpatient case management following: insurance company assigned oupatient case management   Patient currently receives any other outside agency services? No   Equipment Currently Used at Home glucometer;walker, standard;wheelchair;power chair   Do you have any problems affording any of your prescribed medications? No    Is the patient taking medications as prescribed? yes   Does the patient have transportation home? Yes   Transportation Available family or friend will provide   Does the patient receive services at the [facility] Clinic? No   Discharge Plan A Assisted Living   Discharge Plan B Home with family   Patient/Family In Agreement With Plan yes

## 2017-11-08 NOTE — PLAN OF CARE
1042  Out of room       11/08/17 1124   Discharge Assessment   Assessment Type Discharge Planning Assessment

## 2017-11-08 NOTE — HPI
71 y/o cm complains of 1 day of nausea and vomiting post prandial non bilious associated with abdominal pain 5/10 dull intermittent.  Prior episode of bowel blockage last year resolved conservatively.  Denies flatus or bm.  Feels abdomen is bloated but nausea and vomiting have resolved.  Symptoms worse with eating, improved with gi rest.  Denies loose stools, fever, chills.  Last colonoscopy this year as per patient and was normal.

## 2017-11-08 NOTE — PROGRESS NOTES
CT reviewed  Partial small bowel obstruction and recurrent ventral incisional hernia.  Clinically improving, no more nausea or vomiting, tolerated oral contrast and having frequent large amounts of flatus (pt provided demonstration at bs).   Abdominal exam shows easily reducible, soft, non tender incisional hernia.  Conservative management for now. NGT if starts to vomit.  Ok for ice chips, will eventually need hernia repair.    Xray in am to check to see if contrast makes it to colon.

## 2017-11-08 NOTE — PROGRESS NOTES
Progress Note  Hospital Medicine  Patient Name:Bret Garcia  MRN:  [id number]  Patient Class: OP- Observation  Admit Date: 11/7/2017  Length of Stay: 0 days  Expected Discharge Date:   Attending Physician: Jaimee Franks MD  Primary Care Provider:  Pascual Avalos MD    SUBJECTIVE:     Principal Problem: SBO (small bowel obstruction)  Initial history of present illness: Patient is a 72 y.o. male admitted to Hospitalist Service from [facility] with complaint of SBO. Patient reportedly has past medical history significant for hypertension, hyperlipidemia, GERD, DM-2, colon polyp and history of Prostate and colon cancer s/p colectomy (2005). Patient also has prior history of SBO (). Last colonoscopy by Dr. Ramos in . Patient presented with 2 day history of abdominal distention, associated nausea and vomited twice. Patient denied any abdominal pain, hematemesis or melena. Last BM was yesterday. Patient denied chest pain, shortness of breath, headache, vision changes, focal neuro-deficits, cough or fever.    PMH/PSH/SH/FH/Meds: reviewed.    Symptoms/Review of Systems: Abdomen continues to be distended, no flatus or BM. No shortness of breath, cough, chest pain or headache, fever or abdominal pain.     Diet:  NPO  Activity level: Normal.    Pain:  Patient reports no pain.       OBJECTIVE:   Vital Signs (Most Recent):      Temp: 97.8 °F (36.6 °C) (11/08/17 0713)  Pulse: 100 (11/08/17 0713)  Resp: 18 (11/08/17 0713)  BP: 115/73 (11/08/17 0713)  SpO2: (!) 92 % (11/08/17 0713)       Vital Signs Range (Last 24H):  Temp:  [96.2 °F (35.7 °C)-98.2 °F (36.8 °C)]   Pulse:  [100-116]   Resp:  [18]   BP: (107-188)/(66-82)   SpO2:  [92 %-99 %]     Weight: 78 kg (172 lb)  Body mass index is 30.47 kg/m².    Intake/Output Summary (Last 24 hours) at 11/08/17 0742  Last data filed at 11/08/17 0615   Gross per 24 hour   Intake          1118.33 ml   Output              100 ml   Net          1018.33 ml      Physical Examination:  General appearance: well developed, appears stated age  Head: normocephalic, atraumatic  Eyes:  conjunctivae/corneas clear. PERRL.  Nose: Nares normal. Septum midline.  Throat: lips, mucosa, and tongue normal; teeth and gums normal, no throat erythema.  Neck: supple, symmetrical, trachea midline, no JVD and thyroid not enlarged, symmetric, no tenderness/mass/nodules  Lungs:  clear to auscultation bilaterally and normal respiratory effort  Chest wall: no tenderness  Heart: regular rate and rhythm, S1, S2 normal, no murmur, click, rub or gallop  Abdomen: soft, non-tender, distented; bowel sounds hypoactive; no masses,  no organomegaly  Extremities: no cyanosis, clubbing or edema.   Pulses: 2+ and symmetric  Skin: Skin color, texture, turgor normal. No rashes or lesions.  Lymph nodes: Cervical, supraclavicular, and axillary nodes normal.  Neurologic: Normal strength and tone. No focal numbness or weakness. CNII-XII intact.      CBC:    Recent Labs  Lab 11/07/17  1747 11/08/17  0416   WBC 6.10 5.40   RBC 4.22* 3.80*   HGB 13.2* 11.9*   HCT 39.3* 35.1*    279   MCV 93 92   MCH 31.3* 31.4*   MCHC 33.6 34.0   BMP    Recent Labs  Lab 11/07/17  1747   *      K 5.0   CL 96   CO2 28   BUN 40*   CREATININE 1.8*   CALCIUM 9.9   MG 1.9      Diagnostic Results:  Microbiology Results (last 7 days)     ** No results found for the last 168 hours. **         KUB: 1.  Moderately dilated small bowel loops similar to previous. Differential considerations include partial SBO and ileus.  2. Postoperative changes in the abdomen and pelvis are noted.    Assessment/Plan:      *SBO (small bowel obstruction) [K56.609]  History of small bowel obstruction [Z87.19]  Patient deferred NGT with LIS.  Repeat KUB x-ray in AM.  Consult General Surgeon specialist.  Continue IVF hydration. Follow serum lytes.   Use IV anti-emetics as needed.      Yes    Diabetes mellitus 2 [E11.9]  Check blood glucose level  q A6h  Use Novolog Insulin Sliding Scale as needed.    Yes    Hypertension [I10]  Chronic problem. Will continue chronic medications and monitor for any changes, adjusting as needed.   Yes              History of prostate cancer [Z85.46]   Not Applicable    History of colon cancer [Z85.038]  Under care by Dr. Bonilla.    Yes         VTE Risk Mitigation         Ordered     enoxaparin injection 40 mg  Daily     Route:  Subcutaneous        11/07/17 1644     Medium Risk of VTE  Once      11/07/17 1644        Jaimee Franks MD  Department of Hospital Medicine   [facility]

## 2017-11-08 NOTE — CONSULTS
[facility]  General Surgery  Consult Note    Patient Name: Bret Garcia  MRN: [id number]  Code Status: Full Code  Admission Date: 11/7/2017  Hospital Length of Stay: 0 days  Attending Physician: Renuka Franks MD  Primary Care Provider: Pascual Avalos MD    Patient information was obtained from patient and ER records.     Inpatient consult to General Surgery  Consult performed by: LAVONNE BONILLA  Consult ordered by: RENUKA FRANKS  Reason for consult: sbo  Assessment/Recommendations: See note        Subjective:     Principal Problem: SBO (small bowel obstruction)    History of Present Illness: 73 y/o cm complains of 1 day of nausea and vomiting post prandial non bilious associated with abdominal pain 5/10 dull intermittent.  Prior episode of bowel blockage last year resolved conservatively.  Denies flatus or bm.  Feels abdomen is bloated but nausea and vomiting have resolved.  Symptoms worse with eating, improved with gi rest.  Denies loose stools, fever, chills.  Last colonoscopy this year as per patient and was normal.    No current facility-administered medications on file prior to encounter.      Current Outpatient Prescriptions on File Prior to Encounter   Medication Sig    atorvastatin (LIPITOR) 20 MG tablet     benazepril (LOTENSIN) 40 MG tablet Take 40 mg by mouth once daily.     BYDUREON 2 mg SSRR Inject 2 mGy as directed once a week.     duloxetine (CYMBALTA) 30 MG capsule Take 30 mg by mouth every morning.     duloxetine (CYMBALTA) 60 MG capsule Take 60 mg by mouth every evening.      esomeprazole (NEXIUM) 40 MG capsule Take 40 mg by mouth once daily.     levothyroxine (SYNTHROID) 75 MCG tablet Take 1 tablet by mouth.    metformin (GLUCOPHAGE-XR) 500 MG 24 hr tablet TAKE 3 TABLETS BY MOUTH WITH DINNER EVERY DAY    olanzapine (ZYPREXA) 5 MG tablet Take 1 tablet by mouth.    omeprazole (PRILOSEC) 40 MG capsule Take 40 mg by mouth once daily.    pregabalin (LYRICA) 75 MG  capsule Take 75 mg by mouth every evening.     trospium (SANCTURA XR) 60 mg Cp24 capsule Take 1 capsule (60 mg total) by mouth once daily.    clindamycin phosphate foam APPLY 1 GM ON THE SKIN TWICE A DAY    ezetimibe (ZETIA) 10 mg tablet Take 1 tablet by mouth.    ICAR-C 100-250 mg Tab Take 1 tablet by mouth once daily.    polyethylene glycol (GLYCOLAX) 17 gram/dose powder TAKE 17 G EVERY DAY BY ORAL ROUTE FOR 30 DAYS.    simvastatin (ZOCOR) 40 MG tablet Take by mouth. 1 Tablet Oral Every day       Review of patient's allergies indicates:   Allergen Reactions    Codeine Other (See Comments)     Other reaction(s): Rash  hallucinations    Penicillins Other (See Comments)     Other reaction(s): Shortness of breath  Other reaction(s): Itching  Unknown/as a child       Past Medical History:   Diagnosis Date    Colon polyp     Diabetes mellitus     Elevated PSA     GERD (gastroesophageal reflux disease)     Hyperlipidemia     Hypertension     Incontinence of urine     Neuropathy     Personal history of malignant neoplasm of large intestine     colon; prostate    Personal history of malignant neoplasm of prostate      Past Surgical History:   Procedure Laterality Date    APPENDECTOMY      COLON SURGERY  2005    resection    PROSTATE SURGERY  2006    prostatectomy    ROTATOR CUFF REPAIR      Rt shoulder    TONSILLECTOMY, ADENOIDECTOMY      as a baby     Family History     Problem Relation (Age of Onset)    Alcohol abuse Father    Heart disease Father        Social History Main Topics    Smoking status: Never Smoker    Smokeless tobacco: Never Used    Alcohol use No    Drug use: Unknown    Sexual activity: Not on file     Review of Systems   Constitutional: Positive for appetite change. Negative for activity change, fatigue and fever.   HENT: Negative for congestion and sore throat.    Eyes: Negative for redness and visual disturbance.   Respiratory: Negative for cough and choking.     Cardiovascular: Negative for chest pain and palpitations.   Gastrointestinal: Positive for abdominal distention, abdominal pain, constipation, nausea and vomiting. Negative for anal bleeding, blood in stool, diarrhea and rectal pain.   Endocrine: Negative for polydipsia and polyphagia.   Genitourinary: Negative for difficulty urinating and dysuria.   Musculoskeletal: Negative for arthralgias and back pain.   Skin: Negative for rash and wound.   Neurological: Negative for light-headedness and headaches.   Hematological: Negative for adenopathy. Does not bruise/bleed easily.   Psychiatric/Behavioral: Negative for agitation and confusion.     Objective:     Vital Signs (Most Recent):  Temp: 97.8 °F (36.6 °C) (11/08/17 0713)  Pulse: 100 (11/08/17 0713)  Resp: 18 (11/08/17 0713)  BP: 115/73 (11/08/17 0713)  SpO2: (!) 92 % (11/08/17 0713) Vital Signs (24h Range):  Temp:  [96.2 °F (35.7 °C)-98.2 °F (36.8 °C)] 97.8 °F (36.6 °C)  Pulse:  [100-116] 100  Resp:  [18] 18  SpO2:  [92 %-99 %] 92 %  BP: (107-188)/(66-82) 115/73     Weight: 78 kg (172 lb)  Body mass index is 30.47 kg/m².    Physical Exam   Constitutional: He is oriented to person, place, and time. He appears well-nourished. No distress.   HENT:   Head: Atraumatic.   Eyes: Conjunctivae and EOM are normal. Pupils are equal, round, and reactive to light. No scleral icterus.   Neck: Normal range of motion. Neck supple. No JVD present. No tracheal deviation present. No thyromegaly present.   Cardiovascular: Normal rate, regular rhythm and normal heart sounds.  Exam reveals no gallop and no friction rub.    No murmur heard.  Pulmonary/Chest: Effort normal and breath sounds normal. No respiratory distress. He has no wheezes. He has no rales. He exhibits no tenderness.   Abdominal: Soft. Bowel sounds are normal. He exhibits distension. He exhibits no mass. There is no tenderness. There is no rebound and no guarding. A hernia is present.   Midline incision healed with  reducible hernia   Musculoskeletal: Normal range of motion. He exhibits no edema or tenderness.   Neurological: He is alert and oriented to person, place, and time. No cranial nerve deficit.   Skin: Skin is warm and dry. He is not diaphoretic.   Psychiatric: He has a normal mood and affect.   Nursing note and vitals reviewed.      Significant Labs:  CBC:   Recent Labs  Lab 11/08/17  0416   WBC 5.40   RBC 3.80*   HGB 11.9*   HCT 35.1*      MCV 92   MCH 31.4*   MCHC 34.0     CMP:   Recent Labs  Lab 11/07/17  1747   *   CALCIUM 9.9   ALBUMIN 3.8   PROT 7.5      K 5.0   CO2 28   CL 96   BUN 40*   CREATININE 1.8*   ALKPHOS 60   ALT 15   AST 20   BILITOT 0.6       Significant Diagnostics:  I have reviewed and interpreted all pertinent imaging results/findings within the past 24 hours.  Abdominal xray with some dilated loops of small bowel and possibly dilated stomach.      Assessment/Plan:     * SBO (small bowel obstruction)    Obtain CT scan with oral contrast  If vomits will need ngt  Gi rest for now  Further recs after scan          VTE Risk Mitigation         Ordered     enoxaparin injection 40 mg  Daily     Route:  Subcutaneous        11/07/17 1644     Medium Risk of VTE  Once      11/07/17 1644          Thank you for your consult. I will follow-up with patient. Please contact us if you have any additional questions.    Sarah Duron MD  General Surgery  [facility]

## 2017-11-09 LAB
ANION GAP SERPL CALC-SCNC: 11 MMOL/L
BASOPHILS # BLD AUTO: 0 K/UL
BASOPHILS NFR BLD: 0.2 %
BUN SERPL-MCNC: 29 MG/DL
CALCIUM SERPL-MCNC: 8.2 MG/DL
CHLORIDE SERPL-SCNC: 100 MMOL/L
CO2 SERPL-SCNC: 25 MMOL/L
CREAT SERPL-MCNC: 1 MG/DL
DIFFERENTIAL METHOD: ABNORMAL
EOSINOPHIL # BLD AUTO: 0.1 K/UL
EOSINOPHIL NFR BLD: 1.9 %
ERYTHROCYTE [DISTWIDTH] IN BLOOD BY AUTOMATED COUNT: 12.8 %
EST. GFR  (AFRICAN AMERICAN): >60 ML/MIN/1.73 M^2
EST. GFR  (NON AFRICAN AMERICAN): >60 ML/MIN/1.73 M^2
GLUCOSE SERPL-MCNC: 80 MG/DL
HCT VFR BLD AUTO: 32.8 %
HGB BLD-MCNC: 11.2 G/DL
LYMPHOCYTES # BLD AUTO: 2.4 K/UL
LYMPHOCYTES NFR BLD: 37.2 %
MCH RBC QN AUTO: 31.6 PG
MCHC RBC AUTO-ENTMCNC: 34 G/DL
MCV RBC AUTO: 93 FL
MONOCYTES # BLD AUTO: 0.6 K/UL
MONOCYTES NFR BLD: 9.6 %
NEUTROPHILS # BLD AUTO: 3.3 K/UL
NEUTROPHILS NFR BLD: 51.1 %
PLATELET # BLD AUTO: 231 K/UL
PMV BLD AUTO: 7.7 FL
POCT GLUCOSE: 67 MG/DL (ref 70–110)
POCT GLUCOSE: 78 MG/DL (ref 70–110)
POCT GLUCOSE: 88 MG/DL (ref 70–110)
POCT GLUCOSE: 92 MG/DL (ref 70–110)
POTASSIUM SERPL-SCNC: 3.9 MMOL/L
RBC # BLD AUTO: 3.53 M/UL
SODIUM SERPL-SCNC: 136 MMOL/L
WBC # BLD AUTO: 6.5 K/UL

## 2017-11-09 PROCEDURE — C9113 INJ PANTOPRAZOLE SODIUM, VIA: HCPCS | Performed by: INTERNAL MEDICINE

## 2017-11-09 PROCEDURE — 63600175 PHARM REV CODE 636 W HCPCS: Performed by: INTERNAL MEDICINE

## 2017-11-09 PROCEDURE — 80048 BASIC METABOLIC PNL TOTAL CA: CPT

## 2017-11-09 PROCEDURE — 25000003 PHARM REV CODE 250: Performed by: INTERNAL MEDICINE

## 2017-11-09 PROCEDURE — 85025 COMPLETE CBC W/AUTO DIFF WBC: CPT

## 2017-11-09 PROCEDURE — 36415 COLL VENOUS BLD VENIPUNCTURE: CPT

## 2017-11-09 PROCEDURE — 99231 SBSQ HOSP IP/OBS SF/LOW 25: CPT | Mod: ,,, | Performed by: SURGERY

## 2017-11-09 PROCEDURE — 12000002 HC ACUTE/MED SURGE SEMI-PRIVATE ROOM

## 2017-11-09 PROCEDURE — 99232 SBSQ HOSP IP/OBS MODERATE 35: CPT | Mod: ,,, | Performed by: INTERNAL MEDICINE

## 2017-11-09 RX ADMIN — OXYBUTYNIN CHLORIDE 5 MG: 5 TABLET, EXTENDED RELEASE ORAL at 09:11

## 2017-11-09 RX ADMIN — DULOXETINE HYDROCHLORIDE 60 MG: 30 CAPSULE, DELAYED RELEASE ORAL at 08:11

## 2017-11-09 RX ADMIN — OLANZAPINE 5 MG: 5 TABLET, FILM COATED ORAL at 09:11

## 2017-11-09 RX ADMIN — LEVOTHYROXINE SODIUM 75 MCG: 50 TABLET ORAL at 06:11

## 2017-11-09 RX ADMIN — PANTOPRAZOLE SODIUM 40 MG: 40 INJECTION, POWDER, FOR SOLUTION INTRAVENOUS at 09:11

## 2017-11-09 RX ADMIN — SODIUM CHLORIDE: 0.45 INJECTION, SOLUTION INTRAVENOUS at 04:11

## 2017-11-09 RX ADMIN — SODIUM CHLORIDE: 0.45 INJECTION, SOLUTION INTRAVENOUS at 08:11

## 2017-11-09 RX ADMIN — ENOXAPARIN SODIUM 40 MG: 100 INJECTION SUBCUTANEOUS at 06:11

## 2017-11-09 NOTE — NURSING
Spoke with Dr. Franks about pt being NPO except ice chips. Pt has antipsychotic meds due at this time. He stated pt can have meds. Will change order.

## 2017-11-09 NOTE — PROGRESS NOTES
Progress Note  Hospital Medicine  Patient Name:Bret Garcia  MRN:  [id number]  Patient Class: IP- Inpatient  Admit Date: 11/7/2017  Length of Stay: 1 days  Expected Discharge Date:   Attending Physician: Jaimee Franks MD  Primary Care Provider:  Pascual Avalos MD    SUBJECTIVE:     Principal Problem: SBO (small bowel obstruction)  Initial history of present illness: Patient is a 72 y.o. male admitted to Hospitalist Service from [facility] with complaint of SBO. Patient reportedly has past medical history significant for hypertension, hyperlipidemia, GERD, DM-2, colon polyp and history of Prostate and colon cancer s/p colectomy (2005). Patient also has prior history of SBO (). Last colonoscopy by Dr. Ramos in . Patient presented with 2 day history of abdominal distention, associated nausea and vomited twice. Patient denied any abdominal pain, hematemesis or melena. Last BM was yesterday. Patient denied chest pain, shortness of breath, headache, vision changes, focal neuro-deficits, cough or fever.    PMH/PSH/SH/FH/Meds: reviewed.    Symptoms/Review of Systems: Abdomen is less distended, no flatus or BM. No shortness of breath, cough, chest pain or headache, fever or abdominal pain.     Diet:  NPO  Activity level: Normal.    Pain:  Patient reports no pain.       OBJECTIVE:   Vital Signs (Most Recent):      Temp: 97.6 °F (36.4 °C) (11/09/17 0702)  Pulse: 94 (11/09/17 0702)  Resp: 16 (11/09/17 0702)  BP: 120/68 (11/09/17 0702)  SpO2: (!) 93 % (11/09/17 0702)       Vital Signs Range (Last 24H):  Temp:  [97.5 °F (36.4 °C)-98.2 °F (36.8 °C)]   Pulse:  [90-97]   Resp:  [16-18]   BP: (117-143)/(55-81)   SpO2:  [93 %-99 %]     Weight: 78 kg (172 lb)  Body mass index is 30.47 kg/m².    Intake/Output Summary (Last 24 hours) at 11/09/17 0827  Last data filed at 11/09/17 0238   Gross per 24 hour   Intake                0 ml   Output              500 ml   Net             -500 ml     Physical  Examination:  General appearance: well developed, appears stated age  Head: normocephalic, atraumatic  Eyes:  conjunctivae/corneas clear. PERRL.  Nose: Nares normal. Septum midline.  Throat: lips, mucosa, and tongue normal; teeth and gums normal, no throat erythema.  Neck: supple, symmetrical, trachea midline, no JVD and thyroid not enlarged, symmetric, no tenderness/mass/nodules  Lungs:  clear to auscultation bilaterally and normal respiratory effort  Chest wall: no tenderness  Heart: regular rate and rhythm, S1, S2 normal, no murmur, click, rub or gallop  Abdomen: soft, non-tender, distented; bowel sounds hypoactive; no masses,  no organomegaly  Extremities: no cyanosis, clubbing or edema.   Pulses: 2+ and symmetric  Skin: Skin color, texture, turgor normal. No rashes or lesions.  Lymph nodes: Cervical, supraclavicular, and axillary nodes normal.  Neurologic: Normal strength and tone. No focal numbness or weakness. CNII-XII intact.      CBC:    Recent Labs  Lab 11/07/17 1747 11/08/17 0416 11/09/17  0435   WBC 6.10 5.40 6.50   RBC 4.22* 3.80* 3.53*   HGB 13.2* 11.9* 11.2*   HCT 39.3* 35.1* 32.8*    279 231   MCV 93 92 93   MCH 31.3* 31.4* 31.6*   MCHC 33.6 34.0 34.0   BMP    Recent Labs  Lab 11/07/17 1747 11/09/17  0435   * 80    136   K 5.0 3.9   CL 96 100   CO2 28 25   BUN 40* 29*   CREATININE 1.8* 1.0   CALCIUM 9.9 8.2*   MG 1.9  --       Diagnostic Results:  Microbiology Results (last 7 days)     ** No results found for the last 168 hours. **         KUB: 1.  Moderately dilated small bowel loops similar to previous. Differential considerations include partial SBO and ileus.  2. Postoperative changes in the abdomen and pelvis are noted.    Assessment/Plan:      *SBO (small bowel obstruction) [K56.609]  History of small bowel obstruction [Z87.19]  Place NGT with LIS. Discussed with Dr. Madrid.  Repeat KUB x-ray in AM.  Consult General Surgeon specialist.  Continue IVF hydration. Follow  serum lytes.   Use IV anti-emetics as needed.      Yes    Diabetes mellitus 2 [E11.9]  Check blood glucose level q A6h  Use Novolog Insulin Sliding Scale as needed.    Yes    Hypertension [I10]  Chronic problem. Will continue chronic medications and monitor for any changes, adjusting as needed.   Yes              History of prostate cancer [Z85.46]   Not Applicable    History of colon cancer [Z85.038]  Under care by Dr. Bonilla.    Yes     Discussed with patient's wife.    VTE Risk Mitigation         Ordered     enoxaparin injection 40 mg  Daily     Route:  Subcutaneous        11/07/17 1644     Medium Risk of VTE  Once      11/07/17 1644        Jaimee Franks MD  Department of Hospital Medicine   [facility]

## 2017-11-09 NOTE — PLAN OF CARE
Problem: Patient Care Overview  Goal: Plan of Care Review  Outcome: Ongoing (interventions implemented as appropriate)  Pt oriented x 4. Vitals stable. Ambulates well to restroom. NPO except ice chips. Has no complaints at this time

## 2017-11-09 NOTE — PROGRESS NOTES
Pt seen and examined.  Denies n/v.  NOtes abd is still somewhat distended.    Has had flatus but no BM    Wt Readings from Last 3 Encounters:   11/07/17 78 kg (172 lb)   10/02/17 79.8 kg (176 lb)   08/08/17 81.2 kg (179 lb 1.6 oz)     Temp Readings from Last 3 Encounters:   11/09/17 98.2 °F (36.8 °C)   10/02/17 98.2 °F (36.8 °C) (Oral)   08/08/17 98.4 °F (36.9 °C)     BP Readings from Last 3 Encounters:   11/09/17 117/66   10/02/17 128/87   08/08/17 109/74     Pulse Readings from Last 3 Encounters:   11/09/17 96   10/02/17 108   08/08/17 97     AAox3  Soft/nt distended.  Reducible incisional hernia  2+ pulses B    Lab Results   Component Value Date    WBC 6.50 11/09/2017    HGB 11.2 (L) 11/09/2017    HCT 32.8 (L) 11/09/2017    MCV 93 11/09/2017     11/09/2017     BMP  Lab Results   Component Value Date     11/09/2017    K 3.9 11/09/2017     11/09/2017    CO2 25 11/09/2017    BUN 29 (H) 11/09/2017    CREATININE 1.0 11/09/2017    CALCIUM 8.2 (L) 11/09/2017    ANIONGAP 11 11/09/2017    ESTGFRAFRICA >60 11/09/2017    EGFRNONAA >60 11/09/2017     Flat/erect: air seen into the transverse colon.  Contrast from yesterday ct scan not visualized.  Small bowel dilation with minimal improvement from yesterday    A/P: resolving PSBO  D/w pt. I have recommended NG tube to better decompress the proximal bowel and hopefully promote peristalsis.  WIll repeat xray in the Am.  No surgical plans at present

## 2017-11-09 NOTE — NURSING
At bedside with Dr. Madrid. Received orders to place NG tube to decompress stomach. Low continuous suction

## 2017-11-09 NOTE — NURSING
Stated his throat hurt and wanted it removed after an hour and half. Pt educated on the importance of the tube. Pt removed NG tube.

## 2017-11-10 PROBLEM — E11.8 TYPE 2 DIABETES MELLITUS WITH COMPLICATION: Status: ACTIVE | Noted: 2017-11-07

## 2017-11-10 LAB
ANION GAP SERPL CALC-SCNC: 8 MMOL/L
BASOPHILS # BLD AUTO: 0 K/UL
BASOPHILS NFR BLD: 0.4 %
BUN SERPL-MCNC: 17 MG/DL
CALCIUM SERPL-MCNC: 8.5 MG/DL
CHLORIDE SERPL-SCNC: 102 MMOL/L
CO2 SERPL-SCNC: 26 MMOL/L
CREAT SERPL-MCNC: 1 MG/DL
DIFFERENTIAL METHOD: ABNORMAL
EOSINOPHIL # BLD AUTO: 0.1 K/UL
EOSINOPHIL NFR BLD: 1.8 %
ERYTHROCYTE [DISTWIDTH] IN BLOOD BY AUTOMATED COUNT: 12.4 %
EST. GFR  (AFRICAN AMERICAN): >60 ML/MIN/1.73 M^2
EST. GFR  (NON AFRICAN AMERICAN): >60 ML/MIN/1.73 M^2
GLUCOSE SERPL-MCNC: 93 MG/DL
HCT VFR BLD AUTO: 31.3 %
HGB BLD-MCNC: 10.6 G/DL
LYMPHOCYTES # BLD AUTO: 2.4 K/UL
LYMPHOCYTES NFR BLD: 34.2 %
MCH RBC QN AUTO: 31.1 PG
MCHC RBC AUTO-ENTMCNC: 33.8 G/DL
MCV RBC AUTO: 92 FL
MONOCYTES # BLD AUTO: 0.6 K/UL
MONOCYTES NFR BLD: 8.7 %
NEUTROPHILS # BLD AUTO: 3.8 K/UL
NEUTROPHILS NFR BLD: 54.9 %
PLATELET # BLD AUTO: 249 K/UL
PMV BLD AUTO: 7.4 FL
POCT GLUCOSE: 113 MG/DL (ref 70–110)
POCT GLUCOSE: 128 MG/DL (ref 70–110)
POCT GLUCOSE: 168 MG/DL (ref 70–110)
POTASSIUM SERPL-SCNC: 3.3 MMOL/L
RBC # BLD AUTO: 3.41 M/UL
SODIUM SERPL-SCNC: 136 MMOL/L
WBC # BLD AUTO: 6.9 K/UL

## 2017-11-10 PROCEDURE — 80048 BASIC METABOLIC PNL TOTAL CA: CPT

## 2017-11-10 PROCEDURE — 63600175 PHARM REV CODE 636 W HCPCS: Performed by: INTERNAL MEDICINE

## 2017-11-10 PROCEDURE — 25000003 PHARM REV CODE 250: Performed by: INTERNAL MEDICINE

## 2017-11-10 PROCEDURE — C9113 INJ PANTOPRAZOLE SODIUM, VIA: HCPCS | Performed by: INTERNAL MEDICINE

## 2017-11-10 PROCEDURE — 99232 SBSQ HOSP IP/OBS MODERATE 35: CPT | Mod: ,,, | Performed by: INTERNAL MEDICINE

## 2017-11-10 PROCEDURE — 99231 SBSQ HOSP IP/OBS SF/LOW 25: CPT | Mod: ,,, | Performed by: SURGERY

## 2017-11-10 PROCEDURE — 12000002 HC ACUTE/MED SURGE SEMI-PRIVATE ROOM

## 2017-11-10 PROCEDURE — 25000003 PHARM REV CODE 250: Performed by: SURGERY

## 2017-11-10 PROCEDURE — 36415 COLL VENOUS BLD VENIPUNCTURE: CPT

## 2017-11-10 PROCEDURE — 85025 COMPLETE CBC W/AUTO DIFF WBC: CPT

## 2017-11-10 RX ORDER — MUPIROCIN 20 MG/G
OINTMENT TOPICAL DAILY
Status: DISCONTINUED | OUTPATIENT
Start: 2017-11-10 | End: 2017-11-12 | Stop reason: HOSPADM

## 2017-11-10 RX ORDER — POTASSIUM CHLORIDE 7.45 MG/ML
10 INJECTION INTRAVENOUS
Status: COMPLETED | OUTPATIENT
Start: 2017-11-10 | End: 2017-11-10

## 2017-11-10 RX ADMIN — POTASSIUM CHLORIDE 10 MEQ: 10 INJECTION, SOLUTION INTRAVENOUS at 12:11

## 2017-11-10 RX ADMIN — MUPIROCIN: 20 OINTMENT TOPICAL at 01:11

## 2017-11-10 RX ADMIN — OXYBUTYNIN CHLORIDE 5 MG: 5 TABLET, EXTENDED RELEASE ORAL at 09:11

## 2017-11-10 RX ADMIN — LEUCINE, PHENYLALANINE, LYSINE, METHIONINE, ISOLEUCINE, VALINE, HISTIDINE, THREONINE, TRYPTOPHAN, ALANINE, GLYCINE, ARGININE, PROLINE, SERINE, TYROSINE, SODIUM ACETATE, DIBASIC POTASSIUM PHOSPHATE, MAGNESIUM CHLORIDE, SODIUM CHLORIDE, CALCIUM CHLORIDE, DEXTROSE
201; 154; 159; 110; 165; 160; 132; 116; 50; 570; 283; 316; 187; 138; 11; 217; 261; 51; 112; 33; 5 INJECTION INTRAVENOUS at 01:11

## 2017-11-10 RX ADMIN — ENOXAPARIN SODIUM 40 MG: 100 INJECTION SUBCUTANEOUS at 05:11

## 2017-11-10 RX ADMIN — PANTOPRAZOLE SODIUM 40 MG: 40 INJECTION, POWDER, FOR SOLUTION INTRAVENOUS at 09:11

## 2017-11-10 RX ADMIN — POTASSIUM CHLORIDE 40 MEQ: 20 SOLUTION ORAL at 06:11

## 2017-11-10 RX ADMIN — POTASSIUM CHLORIDE 10 MEQ: 10 INJECTION, SOLUTION INTRAVENOUS at 01:11

## 2017-11-10 RX ADMIN — LEVOTHYROXINE SODIUM 75 MCG: 50 TABLET ORAL at 06:11

## 2017-11-10 RX ADMIN — OLANZAPINE 5 MG: 5 TABLET, FILM COATED ORAL at 09:11

## 2017-11-10 RX ADMIN — POTASSIUM CHLORIDE 10 MEQ: 10 INJECTION, SOLUTION INTRAVENOUS at 10:11

## 2017-11-10 RX ADMIN — POTASSIUM CHLORIDE 10 MEQ: 10 INJECTION, SOLUTION INTRAVENOUS at 11:11

## 2017-11-10 RX ADMIN — DULOXETINE HYDROCHLORIDE 60 MG: 30 CAPSULE, DELAYED RELEASE ORAL at 08:11

## 2017-11-10 RX ADMIN — SODIUM PHOSPHATE, DIBASIC AND SODIUM PHOSPHATE, MONOBASIC 1 ENEMA: 7; 19 ENEMA RECTAL at 05:11

## 2017-11-10 NOTE — PLAN OF CARE
Problem: Fall Risk (Adult)  Goal: Identify Related Risk Factors and Signs and Symptoms  Related risk factors and signs and symptoms are identified upon initiation of Human Response Clinical Practice Guideline (CPG)   Outcome: Ongoing (interventions implemented as appropriate)  Patient lying in bed, wife at side, call light within reach, wife assist patient to bathroom, right arm edematous, elevated on pillows,  patient denies pain/ discomfort/ complaints

## 2017-11-10 NOTE — PROGRESS NOTES
Pt seen and examined. Denies n/v.  NOtes flatus. NO bm.     NO abdominal pain    Wt Readings from Last 3 Encounters:   11/09/17 78 kg (172 lb)   10/02/17 79.8 kg (176 lb)   08/08/17 81.2 kg (179 lb 1.6 oz)     Temp Readings from Last 3 Encounters:   11/10/17 97.7 °F (36.5 °C) (Oral)   10/02/17 98.2 °F (36.8 °C) (Oral)   08/08/17 98.4 °F (36.9 °C)     BP Readings from Last 3 Encounters:   11/10/17 (!) 142/87   10/02/17 128/87   08/08/17 109/74     Pulse Readings from Last 3 Encounters:   11/10/17 88   10/02/17 108   08/08/17 97     AAox3  CTA B  Sinus  Soft/nt +BS  2+ pulses B    Lab Results   Component Value Date    WBC 6.90 11/10/2017    HGB 10.6 (L) 11/10/2017    HCT 31.3 (L) 11/10/2017    MCV 92 11/10/2017     11/10/2017     BMP  Lab Results   Component Value Date     11/10/2017    K 3.3 (L) 11/10/2017     11/10/2017    CO2 26 11/10/2017    BUN 17 11/10/2017    CREATININE 1.0 11/10/2017    CALCIUM 8.5 (L) 11/10/2017    ANIONGAP 8 11/10/2017    ESTGFRAFRICA >60 11/10/2017    EGFRNONAA >60 11/10/2017     A/P: resolving PSBO  Will try to advance diet if tolerates can d/c home.  If no improvement will consider surgery Monday   Correct potassium

## 2017-11-10 NOTE — PLAN OF CARE
Problem: Patient Care Overview  Goal: Plan of Care Review  Outcome: Ongoing (interventions implemented as appropriate)  Pt vitals stable. Oriented x 4. Orders received for NG tube that pt removed.  ok'd to leave it out. Walk with assistance to restroom.will continue to monitor.

## 2017-11-10 NOTE — PROGRESS NOTES
Progress Note  Hospital Medicine  Patient Name:Bret Garcia  MRN:  [id number]  Patient Class: IP- Inpatient  Admit Date: 11/7/2017  Length of Stay: 2 days  Expected Discharge Date:   Attending Physician: Jaimee Franks MD  Primary Care Provider:  Pascual Avalos MD    SUBJECTIVE:     Principal Problem: SBO (small bowel obstruction)  Initial history of present illness: Patient is a 72 y.o. male admitted to Hospitalist Service from [facility] with complaint of SBO. Patient reportedly has past medical history significant for hypertension, hyperlipidemia, GERD, DM-2, colon polyp and history of Prostate and colon cancer s/p colectomy (2005). Patient also has prior history of SBO (). Last colonoscopy by Dr. Ramos in . Patient presented with 2 day history of abdominal distention, associated nausea and vomited twice. Patient denied any abdominal pain, hematemesis or melena. Last BM was yesterday. Patient denied chest pain, shortness of breath, headache, vision changes, focal neuro-deficits, cough or fever.    PMH/PSH/SH/FH/Meds: reviewed.    Symptoms/Review of Systems: Continued distention. Pulled out NGT. Swelling to left upper extremity. Still no BM. No shortness of breath, cough, chest pain or headache, fever or abdominal pain.     Diet:  NPO  Activity level: Normal.    Pain:  Patient reports no pain.       OBJECTIVE:   Vital Signs (Most Recent):      Temp: 98 °F (36.7 °C) (11/10/17 0725)  Pulse: 88 (11/10/17 0725)  Resp: 16 (11/10/17 0725)  BP: (!) 156/87 (11/10/17 0725)  SpO2: (!) 93 % (11/10/17 0725)       Vital Signs Range (Last 24H):  Temp:  [97.5 °F (36.4 °C)-98.2 °F (36.8 °C)]   Pulse:  [88-97]   Resp:  [16-18]   BP: (104-156)/(64-87)   SpO2:  [93 %-95 %]     Weight: 78 kg (172 lb)  Body mass index is 30.47 kg/m².    Intake/Output Summary (Last 24 hours) at 11/10/17 0837  Last data filed at 11/09/17 2051   Gross per 24 hour   Intake                0 ml   Output              150 ml    Net             -150 ml     Physical Examination:  General appearance: well developed, appears stated age  Head: normocephalic, atraumatic  Eyes:  conjunctivae/corneas clear. PERRL.  Nose: Nares normal. Septum midline.  Throat: lips, mucosa, and tongue normal; teeth and gums normal, no throat erythema.  Neck: supple, symmetrical, trachea midline, no JVD and thyroid not enlarged, symmetric, no tenderness/mass/nodules  Lungs:  clear to auscultation bilaterally and normal respiratory effort  Chest wall: no tenderness  Heart: regular rate and rhythm, S1, S2 normal, no murmur, click, rub or gallop  Abdomen: soft, non-tender, distented; bowel sounds hypoactive; no masses,  no organomegaly  Extremities: no cyanosis, clubbing or edema.   Pulses: 2+ and symmetric  Skin: Skin color, texture, turgor normal. No rashes or lesions.  Lymph nodes: Cervical, supraclavicular, and axillary nodes normal.  Neurologic: Normal strength and tone. No focal numbness or weakness. CNII-XII intact.      CBC:    Recent Labs  Lab 11/08/17  0416 11/09/17  0435 11/10/17  0447   WBC 5.40 6.50 6.90   RBC 3.80* 3.53* 3.41*   HGB 11.9* 11.2* 10.6*   HCT 35.1* 32.8* 31.3*    231 249   MCV 92 93 92   MCH 31.4* 31.6* 31.1*   MCHC 34.0 34.0 33.8   BMP    Recent Labs  Lab 11/07/17  1747 11/09/17  0435 11/10/17  0447   * 80 93    136 136   K 5.0 3.9 3.3*   CL 96 100 102   CO2 28 25 26   BUN 40* 29* 17   CREATININE 1.8* 1.0 1.0   CALCIUM 9.9 8.2* 8.5*   MG 1.9  --   --       Diagnostic Results:  Microbiology Results (last 7 days)     ** No results found for the last 168 hours. **         KUB: 1.  Moderately dilated small bowel loops similar to previous. Differential considerations include partial SBO and ileus.  2. Postoperative changes in the abdomen and pelvis are noted.    Assessment/Plan:      *SBO (small bowel obstruction) [K56.609]  History of small bowel obstruction [Z87.19]  Patient pulled out NGT. KUB shows no improvement.  Discussed with Dr. Madrid.  Repeat KUB x-ray in AM.  Start clinimex due to prolonged NPO status. Follow serum lytes.   Use IV anti-emetics as needed.  Check US of LUE.      Yes    Diabetes mellitus 2 [E11.9]  Check blood glucose level q A6h  Use Novolog Insulin Sliding Scale as needed.    Yes    Hypertension [I10]  Chronic problem. Will continue chronic medications and monitor for any changes, adjusting as needed.   Yes              History of prostate cancer [Z85.46]   Not Applicable    History of colon cancer [Z85.038]  Under care by Dr. Bonilla.    Yes     Discussed with patient and wife in detail. Answered all questions.     VTE Risk Mitigation         Ordered     enoxaparin injection 40 mg  Daily     Route:  Subcutaneous        11/07/17 1644     Medium Risk of VTE  Once      11/07/17 1644        Jaimee Franks MD  Department of Hospital Medicine   [facility]

## 2017-11-11 LAB
ANION GAP SERPL CALC-SCNC: 8 MMOL/L
BASOPHILS # BLD AUTO: 0 K/UL
BASOPHILS NFR BLD: 0.4 %
BUN SERPL-MCNC: 11 MG/DL
CALCIUM SERPL-MCNC: 8.9 MG/DL
CHLORIDE SERPL-SCNC: 106 MMOL/L
CO2 SERPL-SCNC: 22 MMOL/L
CREAT SERPL-MCNC: 0.9 MG/DL
DIFFERENTIAL METHOD: ABNORMAL
EOSINOPHIL # BLD AUTO: 0.1 K/UL
EOSINOPHIL NFR BLD: 1.6 %
ERYTHROCYTE [DISTWIDTH] IN BLOOD BY AUTOMATED COUNT: 12.5 %
EST. GFR  (AFRICAN AMERICAN): >60 ML/MIN/1.73 M^2
EST. GFR  (NON AFRICAN AMERICAN): >60 ML/MIN/1.73 M^2
GLUCOSE SERPL-MCNC: 131 MG/DL
HCT VFR BLD AUTO: 33 %
HGB BLD-MCNC: 11.4 G/DL
LYMPHOCYTES # BLD AUTO: 2.2 K/UL
LYMPHOCYTES NFR BLD: 29 %
MCH RBC QN AUTO: 32 PG
MCHC RBC AUTO-ENTMCNC: 34.5 G/DL
MCV RBC AUTO: 93 FL
MONOCYTES # BLD AUTO: 0.7 K/UL
MONOCYTES NFR BLD: 9 %
NEUTROPHILS # BLD AUTO: 4.6 K/UL
NEUTROPHILS NFR BLD: 60 %
PLATELET # BLD AUTO: 237 K/UL
PMV BLD AUTO: 7.9 FL
POCT GLUCOSE: 148 MG/DL (ref 70–110)
POCT GLUCOSE: 171 MG/DL (ref 70–110)
POCT GLUCOSE: 186 MG/DL (ref 70–110)
POTASSIUM SERPL-SCNC: 4 MMOL/L
RBC # BLD AUTO: 3.57 M/UL
SODIUM SERPL-SCNC: 136 MMOL/L
WBC # BLD AUTO: 7.7 K/UL

## 2017-11-11 PROCEDURE — 36415 COLL VENOUS BLD VENIPUNCTURE: CPT

## 2017-11-11 PROCEDURE — 80048 BASIC METABOLIC PNL TOTAL CA: CPT

## 2017-11-11 PROCEDURE — 12000002 HC ACUTE/MED SURGE SEMI-PRIVATE ROOM

## 2017-11-11 PROCEDURE — 25000003 PHARM REV CODE 250: Performed by: INTERNAL MEDICINE

## 2017-11-11 PROCEDURE — 63600175 PHARM REV CODE 636 W HCPCS: Performed by: INTERNAL MEDICINE

## 2017-11-11 PROCEDURE — 25000003 PHARM REV CODE 250: Performed by: HOSPITALIST

## 2017-11-11 PROCEDURE — 85025 COMPLETE CBC W/AUTO DIFF WBC: CPT

## 2017-11-11 RX ORDER — EZETIMIBE 10 MG/1
10 TABLET ORAL DAILY
Status: DISCONTINUED | OUTPATIENT
Start: 2017-11-11 | End: 2017-11-12 | Stop reason: HOSPADM

## 2017-11-11 RX ORDER — PREGABALIN 75 MG/1
75 CAPSULE ORAL NIGHTLY
Status: DISCONTINUED | OUTPATIENT
Start: 2017-11-11 | End: 2017-11-12 | Stop reason: HOSPADM

## 2017-11-11 RX ORDER — PANTOPRAZOLE SODIUM 40 MG/1
40 TABLET, DELAYED RELEASE ORAL DAILY
Status: DISCONTINUED | OUTPATIENT
Start: 2017-11-11 | End: 2017-11-12 | Stop reason: HOSPADM

## 2017-11-11 RX ORDER — ATORVASTATIN CALCIUM 20 MG/1
20 TABLET, FILM COATED ORAL DAILY
Status: DISCONTINUED | OUTPATIENT
Start: 2017-11-11 | End: 2017-11-12 | Stop reason: HOSPADM

## 2017-11-11 RX ORDER — DULOXETIN HYDROCHLORIDE 30 MG/1
30 CAPSULE, DELAYED RELEASE ORAL EVERY MORNING
Status: DISCONTINUED | OUTPATIENT
Start: 2017-11-11 | End: 2017-11-11

## 2017-11-11 RX ORDER — BENAZEPRIL HYDROCHLORIDE 10 MG/1
40 TABLET ORAL DAILY
Status: DISCONTINUED | OUTPATIENT
Start: 2017-11-11 | End: 2017-11-12 | Stop reason: HOSPADM

## 2017-11-11 RX ADMIN — EZETIMIBE 10 MG: 10 TABLET ORAL at 12:11

## 2017-11-11 RX ADMIN — PREGABALIN 75 MG: 75 CAPSULE ORAL at 09:11

## 2017-11-11 RX ADMIN — OXYBUTYNIN CHLORIDE 5 MG: 5 TABLET, EXTENDED RELEASE ORAL at 09:11

## 2017-11-11 RX ADMIN — BENAZEPRIL HYDROCHLORIDE 40 MG: 10 TABLET, FILM COATED ORAL at 12:11

## 2017-11-11 RX ADMIN — LEVOTHYROXINE SODIUM 75 MCG: 50 TABLET ORAL at 06:11

## 2017-11-11 RX ADMIN — DULOXETINE HYDROCHLORIDE 60 MG: 30 CAPSULE, DELAYED RELEASE ORAL at 09:11

## 2017-11-11 RX ADMIN — PANTOPRAZOLE SODIUM 40 MG: 40 TABLET, DELAYED RELEASE ORAL at 12:11

## 2017-11-11 RX ADMIN — MUPIROCIN: 20 OINTMENT TOPICAL at 09:11

## 2017-11-11 RX ADMIN — ENOXAPARIN SODIUM 40 MG: 100 INJECTION SUBCUTANEOUS at 05:11

## 2017-11-11 RX ADMIN — ATORVASTATIN CALCIUM 20 MG: 20 TABLET, FILM COATED ORAL at 12:11

## 2017-11-11 RX ADMIN — OLANZAPINE 5 MG: 5 TABLET, FILM COATED ORAL at 09:11

## 2017-11-11 NOTE — ASSESSMENT & PLAN NOTE
1. Partial SBO resolving. Had 2 BMs.  2. Advance diet.  3. Expect can go home in am if tolerates diet.

## 2017-11-11 NOTE — PROGRESS NOTES
"[facility] Surgery  Progress Note    Subjective:     History of Present Illness:  71 y/o cm complains of 1 day of nausea and vomiting post prandial non bilious associated with abdominal pain 5/10 dull intermittent.  Prior episode of bowel blockage last year resolved conservatively.  Denies flatus or bm.  Feels abdomen is bloated but nausea and vomiting have resolved.  Symptoms worse with eating, improved with gi rest.  Denies loose stools, fever, chills.  Last colonoscopy this year as per patient and was normal.    Post-Op Info:  * No surgery found *         Interval History: Feels "fine." Denies abdominal pain of N/V. Had 2 BMs. Xrays show improvement.    Medications:  Continuous Infusions:   Scheduled Meds:   atorvastatin  20 mg Oral Daily    benazepril  40 mg Oral Daily    DULoxetine  60 mg Oral QHS    enoxaparin  40 mg Subcutaneous Daily    exenatide  5 mcg Subcutaneous BID AC    ezetimibe  10 mg Oral Daily    levothyroxine  75 mcg Oral Before breakfast    mupirocin   Topical (Top) Daily    OLANZapine  5 mg Oral Daily    oxybutynin  5 mg Oral Daily    pantoprazole  40 mg Oral Daily    pregabalin  75 mg Oral QHS     PRN Meds:acetaminophen, bisacodyl, dextrose 50%, dextrose 50%, dextrose 50%, glucagon (human recombinant), glucose, glucose, insulin aspart, magnesium oxide, magnesium oxide, ondansetron, potassium chloride 10%, potassium chloride 10%, potassium chloride 10%, potassium, sodium phosphates, potassium, sodium phosphates, potassium, sodium phosphates     Review of patient's allergies indicates:   Allergen Reactions    Codeine Other (See Comments)     Other reaction(s): Rash  hallucinations    Penicillins Other (See Comments)     Other reaction(s): Shortness of breath  Other reaction(s): Itching  Unknown/as a child     Objective:     Vital Signs (Most Recent):  Temp: 97.8 °F (36.6 °C) (11/11/17 1553)  Pulse: 89 (11/11/17 1553)  Resp: 20 (11/11/17 1553)  BP: (!) " 141/77 (11/11/17 1553)  SpO2: (!) 92 % (11/11/17 1553) Vital Signs (24h Range):  Temp:  [97.6 °F (36.4 °C)-98.5 °F (36.9 °C)] 97.8 °F (36.6 °C)  Pulse:  [] 89  Resp:  [16-20] 20  SpO2:  [92 %-97 %] 92 %  BP: (137-142)/(77-92) 141/77     Weight: 78 kg (172 lb)  Body mass index is 30.47 kg/m².    Intake/Output - Last 3 Shifts       11/09 0700 - 11/10 0659 11/10 0700 - 11/11 0659 11/11 0700 - 11/12 0659    P.O.  360     IV Piggyback  400     TPN  325     Total Intake(mL/kg)  1085 (13.9)     Urine (mL/kg/hr) 150 (0.1) 100 (0.1)     Stool  0 (0)     Total Output 150 100      Net -150 +985             Urine Occurrence 1 x 5 x     Stool Occurrence  2 x           Physical Exam   Constitutional: He is oriented to person, place, and time. He appears well-developed and well-nourished. No distress.   HENT:   Head: Normocephalic and atraumatic.   Eyes: Conjunctivae and EOM are normal. Pupils are equal, round, and reactive to light.   Cardiovascular: Normal rate, regular rhythm, normal heart sounds and intact distal pulses.    No murmur heard.  Pulmonary/Chest: Effort normal and breath sounds normal. No respiratory distress. He has no wheezes. He has no rales.   Abdominal: Soft. Bowel sounds are normal. He exhibits distension (Protuberant abdomen.). There is no tenderness. A hernia (Easily reducible.) is present.   Musculoskeletal: Normal range of motion.   Neurological: He is alert and oriented to person, place, and time.   Skin: Skin is warm and dry. No rash noted. No erythema.   Psychiatric: He has a normal mood and affect. His behavior is normal. Judgment normal.       Significant Labs:  CBC:   Recent Labs  Lab 11/11/17  0450   WBC 7.70   RBC 3.57*   HGB 11.4*   HCT 33.0*      MCV 93   MCH 32.0*   MCHC 34.5     BMP:   Recent Labs  Lab 11/07/17  1747  11/11/17  0450   *  < > 131*     < > 136   K 5.0  < > 4.0   CL 96  < > 106   CO2 28  < > 22*   BUN 40*  < > 11   CREATININE 1.8*  < > 0.9   CALCIUM 9.9   < > 8.9   MG 1.9  --   --    < > = values in this interval not displayed.    Significant Diagnostics:  Abdominal xrays reviewed.    Assessment/Plan:     * SBO (small bowel obstruction)    1. Partial SBO resolving. Had 2 BMs.  2. Advance diet.  3. Expect can go home in am if tolerates diet.            Jeff Wolf MD  General Surgery  [facility]

## 2017-11-11 NOTE — PLAN OF CARE
Problem: Patient Care Overview  Goal: Plan of Care Review  Outcome: Ongoing (interventions implemented as appropriate)  Pt has remained free from injury this shift, he has tolerated a clear liquid diet with some abd distention, he states he had a bm and his bowel signs are present.  He has ambulated in abel with family several times this shift.  No complaints of pain or discomfort, accu checks are being assessed with no coverage needed at this time.  He was encouraged to call for assistance or needs with return demonstration on use of call light

## 2017-11-11 NOTE — PROGRESS NOTES
"Progress Note  [facility] Medicine    Admit Date: 11/7/2017    SUBJECTIVE:     Follow-up For:  SBO (small bowel obstruction)      Interval history (See H&P for complete P,F,SHx) : Patient seen and examined.  No acute events overnight.  Patient did have bowel mvmt last night after enema was given, and is tolerating full liquid diet. Plan of care discussed with the patient, wife as well as the bedside nurse in detail.    Review of Systems:   Pain scale: 3/10  Gen- Weakness/ Fatigue  GI- Nausea/Swelling    OBJECTIVE:     Vital Signs Range (Last 24H):  Temp:  [97.6 °F (36.4 °C)-98.5 °F (36.9 °C)]   Pulse:  []   Resp:  [16-20]   BP: (137-142)/(77-92)   SpO2:  [92 %-97 %]     I & O (Last 24H):    Intake/Output Summary (Last 24 hours) at 11/11/17 2029  Last data filed at 11/11/17 1730   Gross per 24 hour   Intake             1038 ml   Output              100 ml   Net              938 ml       Estimated body mass index is 30.47 kg/m² as calculated from the following:    Height as of this encounter: 5' 3" (1.6 m).    Weight as of this encounter: 78 kg (172 lb).    Physical Exam:  General- Patient alert and oriented x3 in NAD  HEENT- PERRLA, EOMI, OP clear, MMM  Neck- No JVD, Lymphadenopathy, Thyromegaly  CV- Regular rate and rhythm, No Murmur/delaney/rubs  Resp- Lungs CTA Bilaterally, No increased WOB  GI- Positive noted abdominal distention without tenderness. Patient's abdomen also is no longer rigid.   Extrem- No cyanosis, clubbing, edema. Pulses 2+ and symmetric  Neuro- Strength 5/5 flexors/extensors, DTRs 2+ and symmetric, Intact sensation to light touch grossly  Skin-  No rashes, masses or lesions noted    Laboratory/Diagnostic Data:  Reviewed and noted in plan where applicable- Please see chart for full lab data.    Medications:  Medication list was reviewed and changes noted under Assessment/Plan.    ASSESSMENT/PLAN:     Active Problems:    Active Hospital Problems    Diagnosis  POA    *SBO (small " bowel obstruction) [K56.609]- Appears to be resolving.  Defer to surgery for surgical plan.  Since patient is tolerating a diet, will restart his patient's oral medications and continue on full liquid diet for the next 24 hours or so.  Advance per surgery Reccs.  Yes    NICOLE (acute kidney injury) [N17.9]- Creatine stable for now. Monitor UOP and serial BMP and adjust therapy as needed. Renally dose meds.  Yes    Type 2 diabetes mellitus with complication [E11.8]-   Patient's FSGs are not controlled on current hypoglycemics.   Most recent fingerstick glucose reviewed-   Recent Labs  Lab 11/11/17  1620   POCTGLUCOSE 148*     Current correctional scale  Low  Increase insulin dose as follows- SSI only  Yes    Hypertension [I10]- Chronic, uncontrolled.  Monitor BP closely.  Will increase BP medications as needed for sustained BP control. Add back PO meds.  Yes      Resolved Hospital Problems    Diagnosis Date Resolved POA   No resolved problems to display.         Disposition- Home    VTE Risk Mitigation         Ordered     enoxaparin injection 40 mg  Daily     Route:  Subcutaneous        11/07/17 1644     Medium Risk of VTE  Once      11/07/17 1644

## 2017-11-11 NOTE — PLAN OF CARE
IV noted to be infiltrated upon assessment. Patient is refusing for IV to be re-started. Notified MD.

## 2017-11-11 NOTE — SUBJECTIVE & OBJECTIVE
"Interval History: Feels "fine." Denies abdominal pain of N/V. Had 2 BMs. Xrays show improvement.    Medications:  Continuous Infusions:   Scheduled Meds:   atorvastatin  20 mg Oral Daily    benazepril  40 mg Oral Daily    DULoxetine  60 mg Oral QHS    enoxaparin  40 mg Subcutaneous Daily    exenatide  5 mcg Subcutaneous BID AC    ezetimibe  10 mg Oral Daily    levothyroxine  75 mcg Oral Before breakfast    mupirocin   Topical (Top) Daily    OLANZapine  5 mg Oral Daily    oxybutynin  5 mg Oral Daily    pantoprazole  40 mg Oral Daily    pregabalin  75 mg Oral QHS     PRN Meds:acetaminophen, bisacodyl, dextrose 50%, dextrose 50%, dextrose 50%, glucagon (human recombinant), glucose, glucose, insulin aspart, magnesium oxide, magnesium oxide, ondansetron, potassium chloride 10%, potassium chloride 10%, potassium chloride 10%, potassium, sodium phosphates, potassium, sodium phosphates, potassium, sodium phosphates     Review of patient's allergies indicates:   Allergen Reactions    Codeine Other (See Comments)     Other reaction(s): Rash  hallucinations    Penicillins Other (See Comments)     Other reaction(s): Shortness of breath  Other reaction(s): Itching  Unknown/as a child     Objective:     Vital Signs (Most Recent):  Temp: 97.8 °F (36.6 °C) (11/11/17 1553)  Pulse: 89 (11/11/17 1553)  Resp: 20 (11/11/17 1553)  BP: (!) 141/77 (11/11/17 1553)  SpO2: (!) 92 % (11/11/17 1553) Vital Signs (24h Range):  Temp:  [97.6 °F (36.4 °C)-98.5 °F (36.9 °C)] 97.8 °F (36.6 °C)  Pulse:  [] 89  Resp:  [16-20] 20  SpO2:  [92 %-97 %] 92 %  BP: (137-142)/(77-92) 141/77     Weight: 78 kg (172 lb)  Body mass index is 30.47 kg/m².    Intake/Output - Last 3 Shifts       11/09 0700 - 11/10 0659 11/10 0700 - 11/11 0659 11/11 0700 - 11/12 0659    P.O.  360     IV Piggyback  400     TPN  325     Total Intake(mL/kg)  1085 (13.9)     Urine (mL/kg/hr) 150 (0.1) 100 (0.1)     Stool  0 (0)     Total Output 150 100      Net -150 +985 "             Urine Occurrence 1 x 5 x     Stool Occurrence  2 x           Physical Exam   Constitutional: He is oriented to person, place, and time. He appears well-developed and well-nourished. No distress.   HENT:   Head: Normocephalic and atraumatic.   Eyes: Conjunctivae and EOM are normal. Pupils are equal, round, and reactive to light.   Cardiovascular: Normal rate, regular rhythm, normal heart sounds and intact distal pulses.    No murmur heard.  Pulmonary/Chest: Effort normal and breath sounds normal. No respiratory distress. He has no wheezes. He has no rales.   Abdominal: Soft. Bowel sounds are normal. He exhibits distension (Protuberant abdomen.). There is no tenderness. A hernia (Easily reducible.) is present.   Musculoskeletal: Normal range of motion.   Neurological: He is alert and oriented to person, place, and time.   Skin: Skin is warm and dry. No rash noted. No erythema.   Psychiatric: He has a normal mood and affect. His behavior is normal. Judgment normal.       Significant Labs:  CBC:   Recent Labs  Lab 11/11/17  0450   WBC 7.70   RBC 3.57*   HGB 11.4*   HCT 33.0*      MCV 93   MCH 32.0*   MCHC 34.5     BMP:   Recent Labs  Lab 11/07/17  1747  11/11/17  0450   *  < > 131*     < > 136   K 5.0  < > 4.0   CL 96  < > 106   CO2 28  < > 22*   BUN 40*  < > 11   CREATININE 1.8*  < > 0.9   CALCIUM 9.9  < > 8.9   MG 1.9  --   --    < > = values in this interval not displayed.    Significant Diagnostics:  Abdominal xrays reviewed.

## 2017-11-12 VITALS
BODY MASS INDEX: 30.48 KG/M2 | DIASTOLIC BLOOD PRESSURE: 76 MMHG | RESPIRATION RATE: 20 BRPM | HEIGHT: 63 IN | SYSTOLIC BLOOD PRESSURE: 107 MMHG | HEART RATE: 127 BPM | TEMPERATURE: 98 F | OXYGEN SATURATION: 95 % | WEIGHT: 172 LBS

## 2017-11-12 LAB
ANION GAP SERPL CALC-SCNC: 8 MMOL/L
BASOPHILS # BLD AUTO: 0.1 K/UL
BASOPHILS NFR BLD: 0.6 %
BUN SERPL-MCNC: 7 MG/DL
CALCIUM SERPL-MCNC: 9.3 MG/DL
CHLORIDE SERPL-SCNC: 104 MMOL/L
CO2 SERPL-SCNC: 27 MMOL/L
CREAT SERPL-MCNC: 1 MG/DL
DIFFERENTIAL METHOD: ABNORMAL
EOSINOPHIL # BLD AUTO: 0.2 K/UL
EOSINOPHIL NFR BLD: 2 %
ERYTHROCYTE [DISTWIDTH] IN BLOOD BY AUTOMATED COUNT: 12.6 %
EST. GFR  (AFRICAN AMERICAN): >60 ML/MIN/1.73 M^2
EST. GFR  (NON AFRICAN AMERICAN): >60 ML/MIN/1.73 M^2
GLUCOSE SERPL-MCNC: 147 MG/DL
HCT VFR BLD AUTO: 35.3 %
HGB BLD-MCNC: 12.1 G/DL
LYMPHOCYTES # BLD AUTO: 2.5 K/UL
LYMPHOCYTES NFR BLD: 28.4 %
MCH RBC QN AUTO: 31.4 PG
MCHC RBC AUTO-ENTMCNC: 34.2 G/DL
MCV RBC AUTO: 92 FL
MONOCYTES # BLD AUTO: 0.8 K/UL
MONOCYTES NFR BLD: 9.2 %
NEUTROPHILS # BLD AUTO: 5.3 K/UL
NEUTROPHILS NFR BLD: 59.8 %
PLATELET # BLD AUTO: 294 K/UL
PMV BLD AUTO: 7.7 FL
POCT GLUCOSE: 254 MG/DL (ref 70–110)
POTASSIUM SERPL-SCNC: 4 MMOL/L
RBC # BLD AUTO: 3.84 M/UL
SODIUM SERPL-SCNC: 139 MMOL/L
WBC # BLD AUTO: 8.9 K/UL

## 2017-11-12 PROCEDURE — 80048 BASIC METABOLIC PNL TOTAL CA: CPT

## 2017-11-12 PROCEDURE — 25000003 PHARM REV CODE 250: Performed by: INTERNAL MEDICINE

## 2017-11-12 PROCEDURE — 63600175 PHARM REV CODE 636 W HCPCS: Performed by: INTERNAL MEDICINE

## 2017-11-12 PROCEDURE — 36415 COLL VENOUS BLD VENIPUNCTURE: CPT

## 2017-11-12 PROCEDURE — 85025 COMPLETE CBC W/AUTO DIFF WBC: CPT

## 2017-11-12 PROCEDURE — 25000003 PHARM REV CODE 250: Performed by: HOSPITALIST

## 2017-11-12 RX ADMIN — MUPIROCIN: 20 OINTMENT TOPICAL at 10:11

## 2017-11-12 RX ADMIN — OLANZAPINE 5 MG: 5 TABLET, FILM COATED ORAL at 10:11

## 2017-11-12 RX ADMIN — LEVOTHYROXINE SODIUM 75 MCG: 50 TABLET ORAL at 06:11

## 2017-11-12 RX ADMIN — BENAZEPRIL HYDROCHLORIDE 40 MG: 10 TABLET, FILM COATED ORAL at 10:11

## 2017-11-12 RX ADMIN — EZETIMIBE 10 MG: 10 TABLET ORAL at 10:11

## 2017-11-12 RX ADMIN — INSULIN ASPART 3 UNITS: 100 INJECTION, SOLUTION INTRAVENOUS; SUBCUTANEOUS at 12:11

## 2017-11-12 RX ADMIN — OXYBUTYNIN CHLORIDE 5 MG: 5 TABLET, EXTENDED RELEASE ORAL at 10:11

## 2017-11-12 RX ADMIN — ATORVASTATIN CALCIUM 20 MG: 20 TABLET, FILM COATED ORAL at 10:11

## 2017-11-12 RX ADMIN — PANTOPRAZOLE SODIUM 40 MG: 40 TABLET, DELAYED RELEASE ORAL at 10:11

## 2017-11-12 NOTE — PLAN OF CARE
Problem: Patient Care Overview  Goal: Plan of Care Review  Outcome: Ongoing (interventions implemented as appropriate)  Patient remains free of injury/fall. Up ad all. Denies pain and nausea. Positive BS x4. Glucose monitored.

## 2017-11-12 NOTE — NURSING
rounded on the pt and ordered a diet, he stated he could possibly go home tomorrow, also I called  regarding the new medication he ordered for the pt for diabetes I explained the medication was unobtainable from the pharmacy at this time he said to put the drug on hold for now.

## 2017-11-12 NOTE — DISCHARGE INSTRUCTIONS
Thank you for choosing [facility] for your medical care. The primary doctor who is taking care of you at the time of your discharge is Jaimee Franks MD.     You were admitted to the hospital with SBO (small bowel obstruction).     Please note your discharge instructions, including diet/activity restrictions, follow-up appointments, and medication changes.  If you have any questions about your medical issues, prescriptions, or any other questions, please feel free to contact the [facility] Medicine Dept at [phone] and we will help.    If you are previously with Home health, outpatient PT/OT or under a therapy program, you are cleared to return to those programs.    Please direct all long term medication refills and follow up to your primary care provider, Pascual Avalos MD. Thank you again for letting us take care of your health care needs.

## 2017-11-13 NOTE — PLAN OF CARE
11/13/17 1454   Final Note   Assessment Type Final Discharge Note   Discharge Disposition Home

## 2017-11-13 NOTE — DISCHARGE SUMMARY
[facility] Hospital Medicine  Discharge Summary      Patient Name: Bret Garcia  MRN: [id number]  Admission Date: 11/7/2017  Hospital Length of Stay: 4 days  Discharge Date and Time: 11/12/2017  2:37 PM  Attending Physician: No att. providers found   Discharging Provider: Ramón Purvis MD  Primary Care Provider: Pascual Avalos MD      HPI:   No notes on file    * No surgery found *      Hospital Course:   Patient is a 72-year-old male past medical history significant for hypertension and depression who presents to the hospital with small bowel obstruction.  Patient was started on conservative management with IV fluids, narcotics and antiemetics.  Initially had nasogastric tube which was removed.  Diet was advanced slowly and patient continued to have issues with nausea and pain.  However, on hospital day #3 the patient's pain seemed to improve and the patient started having bowel movements.  After bowel movements, the patient's diet was advanced and he had no further symptoms of small bowel obstruction.  He was discharged home at his symptomatic baseline.     Consults:   Consults         Status Ordering Provider     Inpatient consult to General Surgery  Once     Provider:  Sarah Duron MD    Completed RENUKA MASTERS          No new Assessment & Plan notes have been filed under this hospital service since the last note was generated.  Service: Hospital Medicine    Final Active Diagnoses:    Diagnosis Date Noted POA    PRINCIPAL PROBLEM:  SBO (small bowel obstruction) [K56.609] 11/07/2017 Yes    NICOLE (acute kidney injury) [N17.9] 11/08/2017 Yes    Type 2 diabetes mellitus with complication [E11.8] 11/07/2017 Yes    Hypertension [I10] 11/07/2017 Yes    History of small bowel obstruction [Z87.19] 11/07/2017 Not Applicable    History of prostate cancer [Z85.46] 03/11/2013 Not Applicable    History of colon cancer [Z85.038] 03/11/2013 Yes      Problems Resolved During this Admission:     Diagnosis Date Noted Date Resolved POA       Discharged Condition: stable    Disposition: Home or Self Care    Follow Up:  Follow-up Information     Psacual Avalos MD In 2 weeks.    Specialty:  Internal Medicine  Contact information:  [address] [facility] [address]  [place] [id number] [phone]                 Patient Instructions:     Diet general   Order Comments: Full liquid for 5 days     Activity as tolerated     Call MD for:  temperature >100.4     Call MD for:  persistent nausea and vomiting or diarrhea     Call MD for:  severe uncontrolled pain         Significant Diagnostic Studies: Labs:   BMP:   Recent Labs  Lab 11/11/17 0450 11/12/17 0432   * 147*    139   K 4.0 4.0    104   CO2 22* 27   BUN 11 7*   CREATININE 0.9 1.0   CALCIUM 8.9 9.3    and CBC   Recent Labs  Lab 11/11/17 0450 11/12/17 0432   WBC 7.70 8.90   HGB 11.4* 12.1*   HCT 33.0* 35.3*    294       Pending Diagnostic Studies:     None         Medications:  Reconciled Home Medications:   Discharge Medication List as of 11/12/2017  1:50 PM      CONTINUE these medications which have NOT CHANGED    Details   atorvastatin (LIPITOR) 20 MG tablet Starting Sun 7/30/2017, Historical Med      benazepril (LOTENSIN) 40 MG tablet Take 40 mg by mouth once daily. , Starting 11/14/2011, Until Discontinued, Historical Med      BYDUREON 2 mg SSRR Inject 2 mGy as directed once a week. , Starting 4/29/2014, Until Discontinued, Historical Med      duloxetine (CYMBALTA) 60 MG capsule Take 60 mg by mouth every evening.  , Until Discontinued, Historical Med      esomeprazole (NEXIUM) 40 MG capsule Take 40 mg by mouth once daily. , Starting 11/14/2011, Until Discontinued, Historical Med      levothyroxine (SYNTHROID) 75 MCG tablet Take 1 tablet by mouth., Starting 11/14/2011, Until Discontinued, Historical Med      metformin (GLUCOPHAGE-XR) 500 MG 24 hr tablet TAKE 3 TABLETS BY MOUTH WITH DINNER EVERY DAY, Historical Med      olanzapine  (ZYPREXA) 5 MG tablet Take 1 tablet by mouth., Starting 11/14/2011, Until Discontinued, Historical Med      omeprazole (PRILOSEC) 40 MG capsule Take 40 mg by mouth once daily., Starting 5/24/2016, Until Discontinued, Historical Med      pregabalin (LYRICA) 75 MG capsule Take 75 mg by mouth every evening. , Starting 11/14/2011, Until Discontinued, Historical Med      trospium (SANCTURA XR) 60 mg Cp24 capsule Take 1 capsule (60 mg total) by mouth once daily., Starting Fri 10/20/2017, Normal      clindamycin phosphate foam APPLY 1 GM ON THE SKIN TWICE A DAY, Historical Med      ezetimibe (ZETIA) 10 mg tablet Take 1 tablet by mouth., Starting 11/14/2011, Until Discontinued, Historical Med      ICAR-C 100-250 mg Tab Take 1 tablet by mouth once daily., Starting 4/9/2014, Until Discontinued, Historical Med      polyethylene glycol (GLYCOLAX) 17 gram/dose powder TAKE 17 G EVERY DAY BY ORAL ROUTE FOR 30 DAYS., Historical Med         STOP taking these medications       simvastatin (ZOCOR) 40 MG tablet Comments:   Reason for Stopping:               Indwelling Lines/Drains at time of discharge:   Lines/Drains/Airways          No matching active lines, drains, or airways          Time spent on the discharge of patient: 33 minutes  Patient was seen and examined on the date of discharge and determined to be suitable for discharge.         Ramón Purvis MD  Department of Hospital Medicine  [facility]

## 2017-11-13 NOTE — HOSPITAL COURSE
Patient is a 72-year-old male past medical history significant for hypertension and depression who presents to the hospital with small bowel obstruction.  Patient was started on conservative management with IV fluids, narcotics and antiemetics.  Initially had nasogastric tube which was removed.  Diet was advanced slowly and patient continued to have issues with nausea and pain.  However, on hospital day #3 the patient's pain seemed to improve and the patient started having bowel movements.  After bowel movements, the patient's diet was advanced and he had no further symptoms of small bowel obstruction.  He was discharged home at his symptomatic baseline.

## 2018-01-09 NOTE — TELEPHONE ENCOUNTER
Patient's insurance will no longer cover [facility].  Dr. Milligan recommends patient take Vesicare.

## 2018-01-10 RX ORDER — SOLIFENACIN SUCCINATE 5 MG/1
5 TABLET, FILM COATED ORAL DAILY
Qty: 30 TABLET | Refills: 11 | Status: SHIPPED | OUTPATIENT
Start: 2018-01-10 | End: 2019-02-05 | Stop reason: ALTCHOICE

## 2018-01-29 ENCOUNTER — TELEPHONE (OUTPATIENT)
Dept: UROLOGY | Facility: CLINIC | Age: 73
End: 2018-01-29

## 2018-01-29 NOTE — TELEPHONE ENCOUNTER
Patient called [facility] and was told they do not have a prescription for Vesicare.  Upon reviewing chart, this was sent to [facility].    I called the Rx to [facility].

## 2018-01-29 NOTE — TELEPHONE ENCOUNTER
----- Message from Shira Baez sent at 1/29/2018 11:12 AM CST -----  Question about Rx Trofpium Chloride.  Please call patient at [phone].

## 2018-02-05 LAB — CEA: 2.8 NG/ML

## 2018-02-06 ENCOUNTER — TELEPHONE (OUTPATIENT)
Dept: HEMATOLOGY/ONCOLOGY | Facility: CLINIC | Age: 73
End: 2018-02-06

## 2018-02-06 NOTE — TELEPHONE ENCOUNTER
----- Message from Robert Bonilla MD sent at 2/6/2018  9:38 AM CST -----  Level looks good      Spoke to Bret to let him know that his CEA level is down to 2.8 which WNL. He voiced understanding

## 2018-02-07 ENCOUNTER — TELEPHONE (OUTPATIENT)
Dept: UROLOGY | Facility: CLINIC | Age: 73
End: 2018-02-07

## 2018-02-07 ENCOUNTER — OFFICE VISIT (OUTPATIENT)
Dept: HEMATOLOGY/ONCOLOGY | Facility: CLINIC | Age: 73
End: 2018-02-07
Payer: MEDICARE

## 2018-02-07 VITALS
BODY MASS INDEX: 31.48 KG/M2 | TEMPERATURE: 98 F | RESPIRATION RATE: 18 BRPM | HEIGHT: 63 IN | SYSTOLIC BLOOD PRESSURE: 102 MMHG | HEART RATE: 103 BPM | WEIGHT: 177.69 LBS | DIASTOLIC BLOOD PRESSURE: 64 MMHG

## 2018-02-07 DIAGNOSIS — D63.8 ANEMIA, CHRONIC DISEASE: ICD-10-CM

## 2018-02-07 DIAGNOSIS — Z85.038 HISTORY OF COLON CANCER: Primary | ICD-10-CM

## 2018-02-07 DIAGNOSIS — E53.8 B12 DEFICIENCY: ICD-10-CM

## 2018-02-07 DIAGNOSIS — Z85.46 HISTORY OF PROSTATE CANCER: ICD-10-CM

## 2018-02-07 DIAGNOSIS — D50.8 OTHER IRON DEFICIENCY ANEMIA: ICD-10-CM

## 2018-02-07 PROCEDURE — 99214 OFFICE O/P EST MOD 30 MIN: CPT | Mod: ,,, | Performed by: INTERNAL MEDICINE

## 2018-02-07 PROCEDURE — 1159F MED LIST DOCD IN RCRD: CPT | Mod: ,,, | Performed by: INTERNAL MEDICINE

## 2018-02-07 RX ORDER — BENAZEPRIL HYDROCHLORIDE 20 MG/1
20 TABLET ORAL DAILY
Refills: 1 | COMMUNITY
Start: 2017-11-07 | End: 2021-06-06

## 2018-02-07 RX ORDER — DULOXETIN HYDROCHLORIDE 30 MG/1
30 CAPSULE, DELAYED RELEASE ORAL DAILY
Refills: 1 | COMMUNITY
Start: 2017-12-05 | End: 2020-04-07 | Stop reason: CLARIF

## 2018-02-07 NOTE — LETTER
February 7, 2018      Pascual Avalos MD  [address] [facility] [address]  [place] [address]           [facility]  [address]  [address]  [place] [address]  Phone: [phone]  Fax: [phone]          Patient: Bret Garcia   MR Number: [id number]   YOB: 1945   Date of Visit: 2/7/2018       Dear Dr. Pascual Avalos:    Thank you for referring Bret Garcia to me for evaluation. Attached you will find relevant portions of my assessment and plan of care.    If you have questions, please do not hesitate to call me. I look forward to following Bret Garcia along with you.    Sincerely,    Robert Bonilla MD    Enclosure  CC:  No Recipients    If you would like to receive this communication electronically, please contact [email or web address] or [phone] to request more information on [employer] Link access.    For providers and/or their staff who would like to refer a patient to [facility], please contact us through our one-stop-shop provider referral line, [facility] [place], at [phone].    If you feel you have received this communication in error or would no longer like to receive these types of communications, please e-mail [email or web address]

## 2018-02-07 NOTE — TELEPHONE ENCOUNTER
----- Message from Leonel Buchanan sent at 2/7/2018  9:22 AM CST -----  Contact: Miranda Lambert want to speak with a nurse regarding changing VESICARE rx please call back at [phone] reference number [id number] cover med is oxybutynin

## 2018-02-07 NOTE — PROGRESS NOTES
[facility] Hematology/Oncology  PROGRESS NOTE      Subjective:       Patient ID:   NAME: Bret Garcia : 1945     72 y.o. male    Referring Doc: Pascual Avalos MD  Other Physicians: Richard, Carlitos Brown Finger    Chief Complaint:  Colon ca f/u    History of Present Illness:     Patient returns today for a regularly scheduled follow-up visit.  The patient is doing ok. No new issues. He had recent CEA level which looked good. He denies any CP, SOB, HA's or N/V. Bowels normal.             ROS:   GEN: normal without any fever, night sweats or weight loss  HEENT: normal with no HA's, sore throat, stiff neck, changes in vision  CV: normal with no CP, SOB, PND, THURMAN or orthopnea  PULM: normal with no SOB, cough, hemoptysis, sputum or pleuritic pain  GI: normal with no abdominal pain, nausea, vomiting, constipation, diarrhea, melanotic stools, BRBPR, or hematemesis  : normal with no hematuria, dysuria  BREAST: normal with no mass, discharge, pain  SKIN: normal with no rash, erythema, bruising, or swelling    Allergies:  Review of patient's allergies indicates:   Allergen Reactions    Codeine Other (See Comments)     Other reaction(s): Rash  hallucinations    Penicillins Other (See Comments)     Other reaction(s): Shortness of breath  Other reaction(s): Itching  Unknown/as a child       Medications:    Current Outpatient Prescriptions:     atorvastatin (LIPITOR) 20 MG tablet, , Disp: , Rfl:     benazepril (LOTENSIN) 20 MG tablet, Take 20 mg by mouth once daily., Disp: , Rfl: 1    benazepril (LOTENSIN) 40 MG tablet, Take 40 mg by mouth once daily. , Disp: , Rfl:     BYDUREON 2 mg SSRR, Inject 2 mGy as directed once a week. , Disp: , Rfl: 1    clindamycin phosphate foam, APPLY 1 GM ON THE SKIN TWICE A DAY, Disp: , Rfl: 5    DULoxetine (CYMBALTA) 30 MG capsule, Take 30 mg by mouth once daily., Disp: , Rfl: 1    duloxetine (CYMBALTA) 60 MG capsule, Take 60 mg by mouth every evening.  , Disp: , Rfl:      "esomeprazole (NEXIUM) 40 MG capsule, Take 40 mg by mouth once daily. , Disp: , Rfl:     ezetimibe (ZETIA) 10 mg tablet, Take 1 tablet by mouth., Disp: , Rfl:     ICAR-C 100-250 mg Tab, Take 1 tablet by mouth once daily., Disp: , Rfl: 3    levothyroxine (SYNTHROID) 75 MCG tablet, Take 1 tablet by mouth., Disp: , Rfl:     metformin (GLUCOPHAGE-XR) 500 MG 24 hr tablet, TAKE 3 TABLETS BY MOUTH WITH DINNER EVERY DAY, Disp: , Rfl: 1    olanzapine (ZYPREXA) 5 MG tablet, Take 1 tablet by mouth., Disp: , Rfl:     omeprazole (PRILOSEC) 40 MG capsule, Take 40 mg by mouth once daily., Disp: , Rfl: 1    polyethylene glycol (GLYCOLAX) 17 gram/dose powder, TAKE 17 G EVERY DAY BY ORAL ROUTE FOR 30 DAYS., Disp: , Rfl: 3    pregabalin (LYRICA) 75 MG capsule, Take 75 mg by mouth every evening. , Disp: , Rfl:     solifenacin (VESICARE) 5 MG tablet, Take 1 tablet (5 mg total) by mouth once daily., Disp: 30 tablet, Rfl: 11    PMHx/PSHx Updates:  See patient's last visit with me on 8/8/2017  See H&P on Vol #1        Pathology:  See vol #1        Objective:     Vitals:  Blood pressure 102/64, pulse 103, temperature 97.7 °F (36.5 °C), resp. rate 18, height 5' 3" (1.6 m), weight 80.6 kg (177 lb 11.2 oz).    Physical Examination:   GEN: no apparent distress, comfortable; AAOx3  HEAD: atraumatic and normocephalic  EYES: no pallor, no icterus, PERRLA  ENT: OMM, no pharyngeal erythema, external ears WNL; no nasal discharge; no thrush  NECK: no masses, thyroid normal, trachea midline, no LAD/LN's, supple  CV: RRR with no murmur; normal pulse; normal S1 and S2; no pedal edema  CHEST: Normal respiratory effort; CTAB; normal breath sounds; no wheeze or crackles  ABDOM: nontender and nondistended; soft; normal bowel sounds; no rebound/guarding  MUSC/Skeletal: ROM normal; no crepitus; joints normal; no deformities or arthropathy  EXTREM: no clubbing, cyanosis, inflammation or swelling  SKIN: no rashes, lesions, ulcers, petechiae or " subcutaneous nodules  : no lawson  NEURO: grossly intact; motor/sensory WNL; AAOx3; no tremors  PSYCH: normal mood, affect and behavior  LYMPH: normal cervical, supraclavicular, axillary and groin LN's            Labs:     CEA 2.8      Radiology/Diagnostic Studies:    11/8/2017  CT abdom:  Impression       1.  Acute, partial small bowel obstruction with a relative zone of transition in the mid abdomen near the site of a small, midline ventral abdominal hernia with herniation of a short segment of small bowel located just inferior to the patient's ventral abdominal mesh. No signs of strangulation noted. There is a moderate volume of colonic fecal material and gas noted.    2. Additional findings:  -Prior prostatectomy and artificial urinary sphincter placement.  -Probable prior right hemicolectomy.  -Mild left basilar atelectasis and elevated left hemidiaphragm.         I have reviewed all available lab results and radiology reports.    Assessment/Plan:   (1) 72 y.o. male with diagnosis of colon cancer in 2005  - followed by Dr Ramos with GI  - prior partial SBO in Nov 2016  - latest CEA looked good at 2.8      (2) Prior pseudotumor of lung - s/p resection   - followed by Dr Brown with Pulm    (3) Prior prostate CA - followed by Dr Urbina with     (4) Steatosis of liver with chronic LFT elevations - also followed by Dr Ramos with GI  - he had an SBO in Nov 2017 and was hospitalized at [facility]    (5) Multifactorial mild anemia - chronic disease, chronic renal and iron deficiency components  - latest hgb adequate at 12.1 and improved  - iron and ferritin good    (6) Mild B12 deficiency - on OTC B12    (7) CRI - followed by Dr Patton    (8) Planned left rotator cuff in March 2018 with Dr Moura      PLAN:  1. Check CXR  2. Check basic labs and then q 6 months incl. CEA  3. F/U with PCP, GI, Pulm and   4. RTC in 6 months  Fax note to Pascual Avalos MD, Pooja, Kevin Urbina, Richard    I spent over 25 mins  with the patient, of which over half was face to face with the patient. Reviewing materials, labs, reports and studies. Making treatment and analytical decisions. Ordering necessary labs, tests and studies.      Discussion:     I have explained all of the above in detail and the patient understands all of the current recommendation(s). I have answered all of their questions to the best of my ability and to their complete satisfaction.   The patient is to continue with the current management plan.            Electronically signed by Robert Bonilla MD

## 2018-05-03 ENCOUNTER — OFFICE VISIT (OUTPATIENT)
Dept: ORTHOPEDICS | Facility: CLINIC | Age: 73
End: 2018-05-03
Payer: MEDICARE

## 2018-05-03 VITALS
WEIGHT: 167 LBS | SYSTOLIC BLOOD PRESSURE: 130 MMHG | HEIGHT: 63 IN | BODY MASS INDEX: 29.59 KG/M2 | HEART RATE: 80 BPM | DIASTOLIC BLOOD PRESSURE: 90 MMHG

## 2018-05-03 DIAGNOSIS — Z96.612 STATUS POST TOTAL SHOULDER ARTHROPLASTY, LEFT: Primary | ICD-10-CM

## 2018-05-03 DIAGNOSIS — Z96.612 PRESENCE OF LEFT ARTIFICIAL SHOULDER JOINT: ICD-10-CM

## 2018-05-03 DIAGNOSIS — M19.012 ARTHRITIS OF LEFT SHOULDER REGION: ICD-10-CM

## 2018-05-03 PROCEDURE — 73030 X-RAY EXAM OF SHOULDER: CPT | Mod: LT,,, | Performed by: ORTHOPAEDIC SURGERY

## 2018-05-03 PROCEDURE — 99024 POSTOP FOLLOW-UP VISIT: CPT | Mod: POP,,, | Performed by: ORTHOPAEDIC SURGERY

## 2018-05-03 NOTE — PROGRESS NOTES
[facility] POST-OP NOTE    Subjective:           Chief Complaint:   Chief Complaint   Patient presents with    Left Shoulder - Post-op Evaluation     [facility] 3-7-18 follow up. Minor pain with movement. He is in P.T        Past Medical History:   Diagnosis Date    Colon polyp     Diabetes mellitus     Elevated PSA     GERD (gastroesophageal reflux disease)     Hyperlipidemia     Hypertension     Incontinence of urine     Neuropathy     Personal history of malignant neoplasm of large intestine     colon; prostate    Personal history of malignant neoplasm of prostate        Past Surgical History:   Procedure Laterality Date    APPENDECTOMY      COLON SURGERY  2005    resection    PROSTATE SURGERY  2006    prostatectomy    ROTATOR CUFF REPAIR      Rt shoulder    TONSILLECTOMY, ADENOIDECTOMY      as a baby       Current Outpatient Prescriptions   Medication Sig    atorvastatin (LIPITOR) 20 MG tablet     benazepril (LOTENSIN) 20 MG tablet Take 20 mg by mouth once daily.    benazepril (LOTENSIN) 40 MG tablet Take 40 mg by mouth once daily.     BYDUREON 2 mg SSRR Inject 2 mGy as directed once a week.     clindamycin phosphate foam APPLY 1 GM ON THE SKIN TWICE A DAY    DULoxetine (CYMBALTA) 30 MG capsule Take 30 mg by mouth once daily.    duloxetine (CYMBALTA) 60 MG capsule Take 60 mg by mouth every evening.      esomeprazole (NEXIUM) 40 MG capsule Take 40 mg by mouth once daily.     ICAR-C 100-250 mg Tab Take 1 tablet by mouth once daily.    levothyroxine (SYNTHROID) 75 MCG tablet Take 1 tablet by mouth.    metformin (GLUCOPHAGE-XR) 500 MG 24 hr tablet TAKE 3 TABLETS BY MOUTH WITH DINNER EVERY DAY    olanzapine (ZYPREXA) 5 MG tablet Take 1 tablet by mouth.    omeprazole (PRILOSEC) 40 MG capsule Take 40 mg by mouth once daily.    polyethylene glycol (GLYCOLAX) 17 gram/dose powder TAKE 17 G EVERY DAY BY ORAL ROUTE FOR 30 DAYS.    pregabalin (LYRICA) 75 MG capsule Take 75 mg by mouth  every evening.     solifenacin (VESICARE) 5 MG tablet Take 1 tablet (5 mg total) by mouth once daily.    exenatide (BYETTA) 5 mcg/dose (250 mcg/mL) 1.2 mL injection Byetta 5 mcg/dose (250 mcg/mL)1.2 mL subcutaneous pen injector    ezetimibe (ZETIA) 10 mg tablet Take 1 tablet by mouth.     No current facility-administered medications for this visit.        Review of patient's allergies indicates:   Allergen Reactions    Codeine Other (See Comments)     Other reaction(s): Rash  hallucinations    Penicillins Other (See Comments)     Other reaction(s): Shortness of breath  Other reaction(s): Itching  Unknown/as a child       Family History   Problem Relation Age of Onset    Heart disease Father     Alcohol abuse Father        Social History     Social History    Marital status:      Spouse name: N/A    Number of children: N/A    Years of education: N/A     Occupational History    Not on file.     Social History Main Topics    Smoking status: Never Smoker    Smokeless tobacco: Never Used    Alcohol use No    Drug use: Unknown    Sexual activity: Not on file     Other Topics Concern    Not on file     Social History Narrative    No narrative on file       History of present illness: Patient returns for continued evaluation of his left shoulder. He is doing very well. His pain is minimal. Patient can now get his hand up way over his head. External rotation is 30°.      Review of Systems:    Musculoskeletal:  See HPI      Objective:        Physical Examination:Patient has full range of motion of his elbow and wrist. He can get his hand up way over his head. External rotation is 30°.    Vital Signs:    Vitals:    05/03/18 0907   BP: (!) 130/90   Pulse: 80       Body mass index is 29.58 kg/m².    This a well-developed, well nourished patient in no acute distress.  They are alert and oriented and cooperative to examination.          Pertinent New Results:    XRAY Report / Interpretation:   AP and  lateral the shoulder demonstrates his reverse total shoulder to be in ideal position. No evidence of loosening or subluxation or dislocation    Assessment/Plan:      Stable following reverse total shoulder. Continue physical therapy. Follow-up in 3 months    This note was created using Dragon voice recognition software that occasionally misinterpreted phrases or words.

## 2018-06-28 ENCOUNTER — OFFICE VISIT (OUTPATIENT)
Dept: ORTHOPEDICS | Facility: CLINIC | Age: 73
End: 2018-06-28
Payer: MEDICARE

## 2018-06-28 VITALS
BODY MASS INDEX: 30.12 KG/M2 | WEIGHT: 170 LBS | SYSTOLIC BLOOD PRESSURE: 106 MMHG | HEIGHT: 63 IN | DIASTOLIC BLOOD PRESSURE: 72 MMHG | HEART RATE: 80 BPM

## 2018-06-28 DIAGNOSIS — Z96.612 STATUS POST TOTAL SHOULDER ARTHROPLASTY, LEFT: Primary | ICD-10-CM

## 2018-06-28 PROCEDURE — 99213 OFFICE O/P EST LOW 20 MIN: CPT | Mod: ,,, | Performed by: ORTHOPAEDIC SURGERY

## 2018-06-28 NOTE — PROGRESS NOTES
[facility] POST-OP NOTE    Subjective:           Chief Complaint:   Chief Complaint   Patient presents with    Left Shoulder - Post-op Evaluation       [facility] 3-7-18 FOLLOW UP. No pain. No problems       Past Medical History:   Diagnosis Date    Colon polyp     Diabetes mellitus     Elevated PSA     GERD (gastroesophageal reflux disease)     Hyperlipidemia     Hypertension     Incontinence of urine     Neuropathy     Personal history of malignant neoplasm of large intestine     colon; prostate    Personal history of malignant neoplasm of prostate        Past Surgical History:   Procedure Laterality Date    APPENDECTOMY      COLON SURGERY  2005    resection    PROSTATE SURGERY  2006    prostatectomy    ROTATOR CUFF REPAIR      Rt shoulder    SHOULDER SURGERY      TONSILLECTOMY, ADENOIDECTOMY      as a baby       Current Outpatient Prescriptions   Medication Sig    atorvastatin (LIPITOR) 20 MG tablet     benazepril (LOTENSIN) 20 MG tablet Take 20 mg by mouth once daily.    benazepril (LOTENSIN) 40 MG tablet Take 40 mg by mouth once daily.     BYDUREON 2 mg SSRR Inject 2 mGy as directed once a week.     DULoxetine (CYMBALTA) 30 MG capsule Take 30 mg by mouth once daily.    duloxetine (CYMBALTA) 60 MG capsule Take 60 mg by mouth every evening.      esomeprazole (NEXIUM) 40 MG capsule Take 40 mg by mouth once daily.     exenatide (BYETTA) 5 mcg/dose (250 mcg/mL) 1.2 mL injection Byetta 5 mcg/dose (250 mcg/mL)1.2 mL subcutaneous pen injector    ezetimibe (ZETIA) 10 mg tablet Take 1 tablet by mouth.    ICAR-C 100-250 mg Tab Take 1 tablet by mouth once daily.    levothyroxine (SYNTHROID) 75 MCG tablet Take 1 tablet by mouth.    metformin (GLUCOPHAGE-XR) 500 MG 24 hr tablet TAKE 3 TABLETS BY MOUTH WITH DINNER EVERY DAY    olanzapine (ZYPREXA) 5 MG tablet Take 1 tablet by mouth.    omeprazole (PRILOSEC) 40 MG capsule Take 40 mg by mouth once daily.    polyethylene glycol (GLYCOLAX)  17 gram/dose powder TAKE 17 G EVERY DAY BY ORAL ROUTE FOR 30 DAYS.    pregabalin (LYRICA) 75 MG capsule Take 75 mg by mouth every evening.     solifenacin (VESICARE) 5 MG tablet Take 1 tablet (5 mg total) by mouth once daily.     No current facility-administered medications for this visit.        Review of patient's allergies indicates:   Allergen Reactions    Codeine Other (See Comments)     Other reaction(s): Rash  hallucinations    Penicillins Other (See Comments)     Other reaction(s): Shortness of breath  Other reaction(s): Itching  Unknown/as a child       Family History   Problem Relation Age of Onset    Heart disease Father     Alcohol abuse Father        Social History     Social History    Marital status:      Spouse name: N/A    Number of children: N/A    Years of education: N/A     Occupational History    Not on file.     Social History Main Topics    Smoking status: Never Smoker    Smokeless tobacco: Never Used    Alcohol use No    Drug use: Unknown    Sexual activity: Not on file     Other Topics Concern    Not on file     Social History Narrative    No narrative on file       History of present illness: Patient returns today for his left reverse total shoulder. He has done very well. He really has no issues. He has no pain. He is very happy.      Review of Systems:    Musculoskeletal:  See HPI      Objective:        Physical Examination:    Vital Signs:    Vitals:    06/28/18 0939   BP: 106/72   Pulse: 80       Body mass index is 30.11 kg/m².    This a well-developed, well nourished patient in no acute distress.  They are alert and oriented and cooperative to examination.        Patient get his hand up way over his head. He has fluid range of motion. He has no discomfort with range of motion  Pertinent New Results:    XRAY Report / Interpretation:   AP and lateral of the left shoulder demonstrates his left reverse total shoulder to be in ideal position    Assessment/Plan:       Left reverse total shoulder doing well. No further intervention. Follow-up when necessary    This note was created using Dragon voice recognition software that occasionally misinterpreted phrases or words.

## 2018-08-14 ENCOUNTER — OFFICE VISIT (OUTPATIENT)
Dept: HEMATOLOGY/ONCOLOGY | Facility: CLINIC | Age: 73
End: 2018-08-14
Payer: MEDICARE

## 2018-08-14 VITALS
WEIGHT: 175.88 LBS | HEART RATE: 94 BPM | BODY MASS INDEX: 31.16 KG/M2 | TEMPERATURE: 98 F | HEIGHT: 63 IN | SYSTOLIC BLOOD PRESSURE: 125 MMHG | DIASTOLIC BLOOD PRESSURE: 85 MMHG

## 2018-08-14 DIAGNOSIS — Z85.038 HISTORY OF COLON CANCER: Primary | ICD-10-CM

## 2018-08-14 DIAGNOSIS — D50.8 OTHER IRON DEFICIENCY ANEMIA: ICD-10-CM

## 2018-08-14 DIAGNOSIS — D63.8 ANEMIA, CHRONIC DISEASE: ICD-10-CM

## 2018-08-14 DIAGNOSIS — Z85.46 HISTORY OF PROSTATE CANCER: ICD-10-CM

## 2018-08-14 DIAGNOSIS — Z87.19 HISTORY OF SMALL BOWEL OBSTRUCTION: ICD-10-CM

## 2018-08-14 DIAGNOSIS — E53.8 B12 DEFICIENCY: ICD-10-CM

## 2018-08-14 PROCEDURE — 99214 OFFICE O/P EST MOD 30 MIN: CPT | Mod: ,,, | Performed by: INTERNAL MEDICINE

## 2018-08-14 NOTE — LETTER
August 14, 2018      Pascual Avalos MD  [address]  [place] [address]           [facility] - Hematology Oncology  [address]  [address]  [place] [address]  Phone: [phone]  Fax: [phone]          Patient: Bret Garcia   MR Number: [id number]   YOB: 1945   Date of Visit: 8/14/2018       Dear Dr. Pascual Avalos:    Thank you for referring Bret Garcia to me for evaluation. Attached you will find relevant portions of my assessment and plan of care.    If you have questions, please do not hesitate to call me. I look forward to following Bret Garcia along with you.    Sincerely,    Robert Bonilla MD    Enclosure  CC:  No Recipients    If you would like to receive this communication electronically, please contact [email or web address] or [phone] to request more information on brick[employer]mobile Link access.    For providers and/or their staff who would like to refer a patient to [facility], please contact us through our one-stop-shop provider referral line, [facility], at [phone].    If you feel you have received this communication in error or would no longer like to receive these types of communications, please e-mail [email or web address]

## 2018-08-14 NOTE — PROGRESS NOTES
[facility] Hematology/Oncology  PROGRESS NOTE      Subjective:       Patient ID:   NAME: Bret Garcia : 1945     73 y.o. male    Referring Doc: Bailey  Other Physicians: Kevin Ramos Romano, Finger    Chief Complaint:  Colon ca f/u    History of Present Illness:     Patient returns today for a regularly scheduled follow-up visit.  The patient is doing ok. No new issues. He had recent CEA level which looked good. He denies any CP, SOB, HA's or N/V. Bowels normal. He is here by himself. He has been on B12 supplements. He saw Dr Ramos and had colonoscopy on Aug 6th and he had two polyps removes.             ROS:   GEN: normal without any fever, night sweats or weight loss  HEENT: normal with no HA's, sore throat, stiff neck, changes in vision  CV: normal with no CP, SOB, PND, THURMAN or orthopnea  PULM: normal with no SOB, cough, hemoptysis, sputum or pleuritic pain  GI: normal with no abdominal pain, nausea, vomiting, constipation, diarrhea, melanotic stools, BRBPR, or hematemesis  : normal with no hematuria, dysuria  BREAST: normal with no mass, discharge, pain  SKIN: normal with no rash, erythema, bruising, or swelling    Allergies:  Review of patient's allergies indicates:   Allergen Reactions    Codeine Other (See Comments)     Other reaction(s): Rash  hallucinations    Penicillins Other (See Comments)     Other reaction(s): Shortness of breath  Other reaction(s): Itching  Unknown/as a child       Medications:    Current Outpatient Medications:     atorvastatin (LIPITOR) 20 MG tablet, , Disp: , Rfl:     benazepril (LOTENSIN) 20 MG tablet, Take 20 mg by mouth once daily., Disp: , Rfl: 1    benazepril (LOTENSIN) 40 MG tablet, Take 40 mg by mouth once daily. , Disp: , Rfl:     BYDUREON 2 mg SSRR, Inject 2 mGy as directed once a week. , Disp: , Rfl: 1    DULoxetine (CYMBALTA) 30 MG capsule, Take 30 mg by mouth once daily., Disp: , Rfl: 1    duloxetine (CYMBALTA) 60 MG capsule, Take 60 mg by mouth every  "evening.  , Disp: , Rfl:     esomeprazole (NEXIUM) 40 MG capsule, Take 40 mg by mouth once daily. , Disp: , Rfl:     exenatide (BYETTA) 5 mcg/dose (250 mcg/mL) 1.2 mL injection, Byetta 5 mcg/dose (250 mcg/mL)1.2 mL subcutaneous pen injector, Disp: , Rfl:     ezetimibe (ZETIA) 10 mg tablet, Take 1 tablet by mouth., Disp: , Rfl:     ICAR-C 100-250 mg Tab, Take 1 tablet by mouth once daily., Disp: , Rfl: 3    levothyroxine (SYNTHROID) 75 MCG tablet, Take 1 tablet by mouth., Disp: , Rfl:     metformin (GLUCOPHAGE-XR) 500 MG 24 hr tablet, TAKE 3 TABLETS BY MOUTH WITH DINNER EVERY DAY, Disp: , Rfl: 1    olanzapine (ZYPREXA) 5 MG tablet, Take 1 tablet by mouth., Disp: , Rfl:     omeprazole (PRILOSEC) 40 MG capsule, Take 40 mg by mouth once daily., Disp: , Rfl: 1    polyethylene glycol (GLYCOLAX) 17 gram/dose powder, TAKE 17 G EVERY DAY BY ORAL ROUTE FOR 30 DAYS., Disp: , Rfl: 3    pregabalin (LYRICA) 75 MG capsule, Take 75 mg by mouth every evening. , Disp: , Rfl:     solifenacin (VESICARE) 5 MG tablet, Take 1 tablet (5 mg total) by mouth once daily., Disp: 30 tablet, Rfl: 11    PMHx/PSHx Updates:  See patient's last visit with me on 2/7/2018  See H&P on Vol #1        Pathology:  See vol #1        Objective:     Vitals:  Blood pressure 125/85, pulse 94, temperature 97.9 °F (36.6 °C), height 5' 3" (1.6 m), weight 79.8 kg (175 lb 14.4 oz).    Physical Examination:   GEN: no apparent distress, comfortable; AAOx3  HEAD: atraumatic and normocephalic  EYES: no pallor, no icterus, PERRLA  ENT: OMM, no pharyngeal erythema, external ears WNL; no nasal discharge; no thrush  NECK: no masses, thyroid normal, trachea midline, no LAD/LN's, supple  CV: RRR with no murmur; normal pulse; normal S1 and S2; no pedal edema  CHEST: Normal respiratory effort; CTAB; normal breath sounds; no wheeze or crackles  ABDOM: nontender and nondistended; soft; normal bowel sounds; no rebound/guarding  MUSC/Skeletal: ROM normal; no crepitus; " joints normal; no deformities or arthropathy  EXTREM: no clubbing, cyanosis, inflammation or swelling  SKIN: no rashes, lesions, ulcers, petechiae or subcutaneous nodules  : no lawson  NEURO: grossly intact; motor/sensory WNL; AAOx3; no tremors  PSYCH: normal mood, affect and behavior  LYMPH: normal cervical, supraclavicular, axillary and groin LN's            Labs:     8/3/2018  Lab Results   Component Value Date    WBC 5.9 08/03/2018    HGB 12.1 (L) 08/03/2018    HCT 36.0 (L) 08/03/2018    MCV 91.8 08/03/2018     08/03/2018     BMP  Lab Results   Component Value Date     08/03/2018    K 4.4 08/03/2018    CL 99 08/03/2018    CO2 28.6 08/03/2018    BUN 15 08/03/2018    CREATININE 1.24 08/03/2018    CALCIUM 9.4 08/03/2018    ANIONGAP 8 11/12/2017    ESTGFRAFRICA >60 11/12/2017    EGFRNONAA >60 11/12/2017     Lab Results   Component Value Date    ALT 15 11/07/2017    AST 27 08/03/2018    ALKPHOS 54 08/03/2018    BILITOT 0.4 08/03/2018     Lab Results   Component Value Date    CEA 2.2 08/03/2018       Lab Results   Component Value Date    IRON 79 08/03/2018    TIBC 288 08/03/2018    FERRITIN 80 08/03/2018           Radiology/Diagnostic Studies:    11/8/2017  CT abdom:  Impression       1.  Acute, partial small bowel obstruction with a relative zone of transition in the mid abdomen near the site of a small, midline ventral abdominal hernia with herniation of a short segment of small bowel located just inferior to the patient's ventral abdominal mesh. No signs of strangulation noted. There is a moderate volume of colonic fecal material and gas noted.    2. Additional findings:  -Prior prostatectomy and artificial urinary sphincter placement.  -Probable prior right hemicolectomy.  -Mild left basilar atelectasis and elevated left hemidiaphragm.         I have reviewed all available lab results and radiology reports.    Assessment/Plan:   (1) 73 y.o. male with diagnosis of colon cancer in 2005  - followed by  Dr Ramos with GI with recent colonoscopy on Aug 6th with two polyps removed  - prior partial SBO in Nov 2016  - latest CEA looked good at 2.2      (2) Prior pseudotumor of lung - s/p resection   - followed by Dr Brown with Pulm    (3) Prior prostate CA - followed by Dr Urbina with     (4) Steatosis of liver with chronic LFT elevations - also followed by Dr Ramos with GI  - he had an SBO in Nov 2017 and was hospitalized at [facility]    (5) Multifactorial mild anemia - chronic disease, chronic renal and iron deficiency components  - latest hgb adequate at 12.1 and stable  - iron and ferritin good    (6) Mild B12 deficiency - on OTC B12 - level at 634 and good    (7) CRI - followed by Dr Patton    (8) Planned left rotator cuff in March 2018 with Dr Moura    1. History of colon cancer     2. History of prostate cancer     3. Other iron deficiency anemia     4. Anemia, chronic disease     5. B12 deficiency     6. History of small bowel obstruction             PLAN:  1. Check CXR  2. Continue to check basic labs and CEA q 6 months   3. F/U with PCP, GI, Pulm and   4. RTC in 6 months  Fax note to Pascual Avalos MD, Carlitos Patton [place], Richard    I spent over 25 mins with the patient, of which over half was face to face with the patient. Reviewing materials, labs, reports and studies. Making treatment and analytical decisions. Ordering necessary labs, tests and studies.      Discussion:     I have explained all of the above in detail and the patient understands all of the current recommendation(s). I have answered all of their questions to the best of my ability and to their complete satisfaction.   The patient is to continue with the current management plan.            Electronically signed by Robert Bonilla MD

## 2019-02-05 ENCOUNTER — LAB VISIT (OUTPATIENT)
Dept: LAB | Facility: HOSPITAL | Age: 74
End: 2019-02-05
Attending: UROLOGY
Payer: MEDICARE

## 2019-02-05 ENCOUNTER — OFFICE VISIT (OUTPATIENT)
Dept: UROLOGY | Facility: CLINIC | Age: 74
End: 2019-02-05
Payer: MEDICARE

## 2019-02-05 VITALS
TEMPERATURE: 98 F | DIASTOLIC BLOOD PRESSURE: 73 MMHG | BODY MASS INDEX: 31.29 KG/M2 | SYSTOLIC BLOOD PRESSURE: 144 MMHG | RESPIRATION RATE: 18 BRPM | WEIGHT: 176.56 LBS | HEIGHT: 63 IN | HEART RATE: 97 BPM

## 2019-02-05 DIAGNOSIS — C61 MALIGNANT NEOPLASM OF PROSTATE: ICD-10-CM

## 2019-02-05 DIAGNOSIS — C61 MALIGNANT NEOPLASM OF PROSTATE: Primary | ICD-10-CM

## 2019-02-05 DIAGNOSIS — R32 URINARY INCONTINENCE, UNSPECIFIED TYPE: ICD-10-CM

## 2019-02-05 LAB — COMPLEXED PSA SERPL-MCNC: <0.01 NG/ML

## 2019-02-05 PROCEDURE — 99214 PR OFFICE/OUTPT VISIT, EST, LEVL IV, 30-39 MIN: ICD-10-PCS | Mod: S$PBB,,, | Performed by: UROLOGY

## 2019-02-05 PROCEDURE — 99999 PR PBB SHADOW E&M-EST. PATIENT-LVL III: CPT | Mod: PBBFAC,,, | Performed by: UROLOGY

## 2019-02-05 PROCEDURE — 36415 COLL VENOUS BLD VENIPUNCTURE: CPT

## 2019-02-05 PROCEDURE — 99213 OFFICE O/P EST LOW 20 MIN: CPT | Mod: PBBFAC,PN | Performed by: UROLOGY

## 2019-02-05 PROCEDURE — 99214 OFFICE O/P EST MOD 30 MIN: CPT | Mod: S$PBB,,, | Performed by: UROLOGY

## 2019-02-05 PROCEDURE — 84153 ASSAY OF PSA TOTAL: CPT

## 2019-02-05 PROCEDURE — 81000 URINALYSIS NONAUTO W/SCOPE: CPT | Mod: PBBFAC,PN | Performed by: UROLOGY

## 2019-02-05 PROCEDURE — 99999 PR PBB SHADOW E&M-EST. PATIENT-LVL III: ICD-10-PCS | Mod: PBBFAC,,, | Performed by: UROLOGY

## 2019-02-05 RX ORDER — PREGABALIN 75 MG/1
150 CAPSULE ORAL NIGHTLY
COMMUNITY
End: 2020-04-07 | Stop reason: CLARIF

## 2019-02-05 RX ORDER — ASPIRIN 81 MG/1
81 TABLET ORAL DAILY
COMMUNITY
End: 2021-09-16 | Stop reason: SDUPTHER

## 2019-02-05 NOTE — PROGRESS NOTES
OFFICE NOTE    CHIEF COMPLAINT:  Adenocarcinoma of the prostate, urinary incontinence.    HISTORY OF PRESENT ILLNESS:  This 73-year-old male returns for routine recheck.    He has been under the care of Dr. Urbina and he has undergone a radical   retropubic prostatectomy in 2006 and his most recent PSA was less than 0.01 on   09/20/2017.  He also underwent placement of artificial urinary sphincter in   2011, which overall he has been doing quite well, but over the past year he has   been having some malfunction of it with some urinary incontinence.  He has been   on Ditropan, but it has not been as effective lately.  There has been otherwise   no other change in general health.    PHYSICAL EXAMINATION:  ABDOMEN:  Soft, benign and nontender.  No masses.  No hernias, no organomegaly.  EXTERNAL GENITAL:  Normal phallus with adequate meatus.  On scrotal examination,   I was able to palpate the pump of the sphincter in the left hemiscrotum and   with the patient in the standing position, and there was in fact no evidence of   any leakage, although when the pump was activated there was some leakage noted.  RECTAL:  Empty prostatic fossa.  No nodule.  Normal sphincter tone.    His last PSA is less than 0.01 on 09/20/2017.    UA negative with pH 5.5.    FINAL IMPRESSION:  Adenocarcinoma of the prostate, urinary incontinence.    RECOMMENDATIONS:  PSA.  The patient instructed to perform sphincter exercises,   and we will place him on a trial of Myrbetriq 25 mg p.o. daily.  We will contact   him with the PSA report.      SÁNCHEZ  dd: 02/05/2019 13:54:21 (CST)  td: 02/06/2019 02:26:45 (CST)  Doc ID   #[id number]  Job ID #[id number]    CC:

## 2019-02-13 ENCOUNTER — TELEPHONE (OUTPATIENT)
Dept: HEMATOLOGY/ONCOLOGY | Facility: CLINIC | Age: 74
End: 2019-02-13

## 2019-02-13 DIAGNOSIS — D50.8 OTHER IRON DEFICIENCY ANEMIA: ICD-10-CM

## 2019-02-13 DIAGNOSIS — Z85.46 HISTORY OF PROSTATE CANCER: Primary | ICD-10-CM

## 2019-02-13 DIAGNOSIS — Z85.038 HISTORY OF COLON CANCER: ICD-10-CM

## 2019-02-13 NOTE — PROGRESS NOTES
[facility] Hematology/Oncology  PROGRESS NOTE      Subjective:       Patient ID:   NAME: Bret Garcia : 1945     73 y.o. male    Referring Doc: Bailey  Other Physicians: Kevin Ramos Romano, Finger    Chief Complaint:  Colon ca f/u    History of Present Illness:     Patient returns today for a regularly scheduled follow-up visit.  The patient is doing ok. No new issues. He denies any CP, SOB, HA's or N/V. He reports that his bowels normal. He is here by himself. He has been on B12 supplements. He previously saw Dr Ramos and had colonoscopy in Aug 2018 and he had two polyps removes.             ROS:   GEN: normal without any fever, night sweats or weight loss  HEENT: normal with no HA's, sore throat, stiff neck, changes in vision  CV: normal with no CP, SOB, PND, THURMAN or orthopnea  PULM: normal with no SOB, cough, hemoptysis, sputum or pleuritic pain  GI: normal with no abdominal pain, nausea, vomiting, constipation, diarrhea, melanotic stools, BRBPR, or hematemesis  : normal with no hematuria, dysuria  BREAST: normal with no mass, discharge, pain  SKIN: normal with no rash, erythema, bruising, or swelling    Allergies:  Review of patient's allergies indicates:   Allergen Reactions    Codeine Other (See Comments)     Other reaction(s): Rash  hallucinations    Penicillins Other (See Comments)     Other reaction(s): Shortness of breath  Other reaction(s): Itching  Unknown/as a child       Medications:    Current Outpatient Medications:     aspirin (ECOTRIN) 81 MG EC tablet, Take 81 mg by mouth once daily., Disp: , Rfl:     atorvastatin (LIPITOR) 20 MG tablet, , Disp: , Rfl:     benazepril (LOTENSIN) 20 MG tablet, Take 20 mg by mouth once daily., Disp: , Rfl: 1    BYDUREON 2 mg SSRR, Inject 2 mGy as directed once a week. , Disp: , Rfl: 1    DULoxetine (CYMBALTA) 30 MG capsule, Take 30 mg by mouth once daily., Disp: , Rfl: 1    duloxetine (CYMBALTA) 60 MG capsule, Take 60 mg by mouth every evening.  ,  Disp: , Rfl:     exenatide (BYETTA) 5 mcg/dose (250 mcg/mL) 1.2 mL injection, Byetta 5 mcg/dose (250 mcg/mL)1.2 mL subcutaneous pen injector, Disp: , Rfl:     levothyroxine (SYNTHROID) 75 MCG tablet, Take 1 tablet by mouth., Disp: , Rfl:     metformin (GLUCOPHAGE-XR) 500 MG 24 hr tablet, bid, Disp: , Rfl: 1    olanzapine (ZYPREXA) 5 MG tablet, Take 1 tablet by mouth., Disp: , Rfl:     omeprazole (PRILOSEC) 40 MG capsule, Take 40 mg by mouth once daily., Disp: , Rfl: 1    pregabalin (LYRICA) 75 MG capsule, Lyrica 75 mg capsule  TAKE 1-2 CAPSULE(S) BY MOUTH AT BEDTIME, Disp: , Rfl:     PMHx/PSHx Updates:  See patient's last visit with me on 8/14/2018  See H&P on Vol #1        Pathology:  See vol #1        Objective:     Vitals:  Blood pressure 122/80, pulse 81, temperature 97.4 °F (36.3 °C), resp. rate 20, weight 81.8 kg (180 lb 6.4 oz).    Physical Examination:   GEN: no apparent distress, comfortable; AAOx3  HEAD: atraumatic and normocephalic  EYES: no pallor, no icterus, PERRLA  ENT: OMM, no pharyngeal erythema, external ears WNL; no nasal discharge; no thrush  NECK: no masses, thyroid normal, trachea midline, no LAD/LN's, supple  CV: RRR with no murmur; normal pulse; normal S1 and S2; no pedal edema  CHEST: Normal respiratory effort; CTAB; normal breath sounds; no wheeze or crackles  ABDOM: nontender and nondistended; soft; normal bowel sounds; no rebound/guarding  MUSC/Skeletal: ROM normal; no crepitus; joints normal; no deformities or arthropathy  EXTREM: no clubbing, cyanosis, inflammation or swelling  SKIN: no rashes, lesions, ulcers, petechiae or subcutaneous nodules  : no lawson  NEURO: grossly intact; motor/sensory WNL; AAOx3; no tremors  PSYCH: normal mood, affect and behavior  LYMPH: normal cervical, supraclavicular, axillary and groin LN's            Labs:       2/14/2019 pending      8/3/2018  Lab Results   Component Value Date    WBC 5.9 08/03/2018    HGB 12.1 (L) 08/03/2018    HCT 36.0 (L)  08/03/2018    MCV 91.8 08/03/2018     08/03/2018     BMP  Lab Results   Component Value Date     08/03/2018    K 4.4 08/03/2018    CL 99 08/03/2018    CO2 28.6 08/03/2018    BUN 15 08/03/2018    CREATININE 1.24 08/03/2018    CALCIUM 9.4 08/03/2018    ANIONGAP 8 11/12/2017    ESTGFRAFRICA >60 11/12/2017    EGFRNONAA >60 11/12/2017     Lab Results   Component Value Date    ALT 15 11/07/2017    AST 27 08/03/2018    ALKPHOS 54 08/03/2018    BILITOT 0.4 08/03/2018     Lab Results   Component Value Date    CEA 2.2 08/03/2018       Lab Results   Component Value Date    IRON 79 08/03/2018    TIBC 288 08/03/2018    FERRITIN 80 08/03/2018           Radiology/Diagnostic Studies:    11/8/2017  CT abdom:  Impression       1.  Acute, partial small bowel obstruction with a relative zone of transition in the mid abdomen near the site of a small, midline ventral abdominal hernia with herniation of a short segment of small bowel located just inferior to the patient's ventral abdominal mesh. No signs of strangulation noted. There is a moderate volume of colonic fecal material and gas noted.    2. Additional findings:  -Prior prostatectomy and artificial urinary sphincter placement.  -Probable prior right hemicolectomy.  -Mild left basilar atelectasis and elevated left hemidiaphragm.         I have reviewed all available lab results and radiology reports.    Assessment/Plan:   (1) 73 y.o. male with diagnosis of colon cancer in 2005  - followed by Dr Ramos with GI with recent colonoscopy on Aug 6th 2018 with two polyps removed  - prior partial SBO in Nov 2016  - latest CEA looked good at 2.2; today's is pending      (2) Prior pseudotumor of lung - s/p resection   - followed by Dr Brown with Pulm    (3) Prior prostate CA - followed by Dr Urbina with     (4) Steatosis of liver with chronic LFT elevations - also followed by Dr Ramos with GI  - he had an SBO in Nov 2017 and was hospitalized at [facility]    (5)  Multifactorial mild anemia - chronic disease, chronic renal and iron deficiency components  - latest hgb adequate at 12.1 and stable (today's is pending)  - iron and ferritin good    (6) Mild B12 deficiency - on OTC B12 - level at 634 and good    (7) CRI - followed by Dr Patton    (8) Planned left rotator cuff in March 2018 with Dr Moura    1. History of colon cancer     2. History of prostate cancer     3. Other iron deficiency anemia     4. Anemia, chronic disease     5. B12 deficiency             PLAN:  1.  Check CXR   2. Continue to check basic labs and CEA q 6 months   3. F/U with PCP, GI, Pulm and   4. RTC in 6 months  Fax note to Pooja Avalos, Kevin Urbina, Richard    I spent over 25 mins with the patient, of which over half was face to face with the patient. Reviewing materials, labs, reports and studies. Making treatment and analytical decisions. Ordering necessary labs, tests and studies.      Discussion:     I have explained all of the above in detail and the patient understands all of the current recommendation(s). I have answered all of their questions to the best of my ability and to their complete satisfaction.   The patient is to continue with the current management plan.            Electronically signed by Robert Bonilla MD

## 2019-02-13 NOTE — TELEPHONE ENCOUNTER
Called to see if the pt had any labs done prior to f/u appt.    Faxing orders to the imaging center to be done by the pt today.

## 2019-02-14 ENCOUNTER — OFFICE VISIT (OUTPATIENT)
Dept: HEMATOLOGY/ONCOLOGY | Facility: CLINIC | Age: 74
End: 2019-02-14
Payer: MEDICARE

## 2019-02-14 VITALS
RESPIRATION RATE: 20 BRPM | SYSTOLIC BLOOD PRESSURE: 122 MMHG | HEART RATE: 81 BPM | WEIGHT: 180.38 LBS | BODY MASS INDEX: 31.96 KG/M2 | DIASTOLIC BLOOD PRESSURE: 80 MMHG | TEMPERATURE: 97 F

## 2019-02-14 DIAGNOSIS — Z85.038 HISTORY OF COLON CANCER: Primary | ICD-10-CM

## 2019-02-14 DIAGNOSIS — D50.8 OTHER IRON DEFICIENCY ANEMIA: ICD-10-CM

## 2019-02-14 DIAGNOSIS — E53.8 B12 DEFICIENCY: ICD-10-CM

## 2019-02-14 DIAGNOSIS — Z85.46 HISTORY OF PROSTATE CANCER: ICD-10-CM

## 2019-02-14 DIAGNOSIS — D63.8 ANEMIA, CHRONIC DISEASE: ICD-10-CM

## 2019-02-14 LAB
% SATURATION: 25 %
ALBUMIN SERPL-MCNC: 4.1 G/DL (ref 3.1–4.7)
ALP SERPL-CCNC: 51 IU/L (ref 40–104)
ALT (SGPT): 35 IU/L (ref 3–33)
AST SERPL-CCNC: 34 IU/L (ref 10–40)
BASOPHILS NFR BLD: 0 K/UL (ref 0–0.2)
BASOPHILS NFR BLD: 0.7 %
BILIRUB SERPL-MCNC: 0.7 MG/DL (ref 0.3–1)
BUN SERPL-MCNC: 22 MG/DL (ref 8–20)
CALCIUM SERPL-MCNC: 9.8 MG/DL (ref 7.7–10.4)
CHLORIDE: 99 MMOL/L (ref 98–110)
CO2 SERPL-SCNC: 27.9 MMOL/L (ref 22.8–31.6)
CREATININE: 1.1 MG/DL (ref 0.6–1.4)
EOSINOPHIL NFR BLD: 0.2 K/UL (ref 0–0.7)
EOSINOPHIL NFR BLD: 3.4 %
ERYTHROCYTE [DISTWIDTH] IN BLOOD BY AUTOMATED COUNT: 12.5 % (ref 11.7–14.9)
FERRITIN SERPL-MCNC: 83 NG/ML (ref 37–201)
GLUCOSE: 119 MG/DL (ref 70–99)
GRAN #: 2.7 K/UL (ref 1.4–6.5)
GRAN%: 45.1 %
HCT VFR BLD AUTO: 35.5 % (ref 39–55)
HGB BLD-MCNC: 11.7 G/DL (ref 14–16)
IMMATURE GRANS (ABS): 0 K/UL (ref 0–1)
IMMATURE GRANULOCYTES: 0.2 %
IRON: 72 MCG/DL (ref 32–176)
LYMPH #: 2.5 K/UL (ref 1.2–3.4)
LYMPH%: 42.6 %
MCH RBC QN AUTO: 31.2 PG (ref 25–35)
MCHC RBC AUTO-ENTMCNC: 33 G/DL (ref 31–36)
MCV RBC AUTO: 94.7 FL (ref 80–100)
MONO #: 0.5 K/UL (ref 0.1–0.6)
MONO%: 8 %
NUCLEATED RBCS: 0 %
PLATELET # BLD AUTO: 259 K/UL (ref 140–440)
PMV BLD AUTO: 9.9 FL (ref 8.8–12.7)
POTASSIUM SERPL-SCNC: 4.2 MMOL/L (ref 3.5–5)
PROT SERPL-MCNC: 6.7 G/DL (ref 6–8.2)
RBC # BLD AUTO: 3.75 M/UL (ref 4.3–5.9)
SODIUM: 140 MMOL/L (ref 134–144)
TOTAL IRON BINDING CAPACITY: 286 MCG/DL (ref 177–435)
WBC # BLD AUTO: 5.9 K/UL (ref 5–10)

## 2019-02-14 PROCEDURE — 99213 PR OFFICE/OUTPT VISIT, EST, LEVL III, 20-29 MIN: ICD-10-PCS | Mod: ,,, | Performed by: INTERNAL MEDICINE

## 2019-02-14 PROCEDURE — 99213 OFFICE O/P EST LOW 20 MIN: CPT | Mod: ,,, | Performed by: INTERNAL MEDICINE

## 2019-02-14 NOTE — LETTER
February 14, 2019      Pascual Avalos MD  [address]  [place] [address]           [facility] - Hematology Oncology  [address]  [address]  [place] [address]  Phone: [phone]  Fax: [phone]          Patient: Bret Garcia   MR Number: [id number]   YOB: 1945   Date of Visit: 2/14/2019       Dear Dr. Pascual Avalos:    Thank you for referring Bret Garcia to me for evaluation. Attached you will find relevant portions of my assessment and plan of care.    If you have questions, please do not hesitate to call me. I look forward to following Bret Garcia along with you.    Sincerely,    Robert Bonilla MD    Enclosure  CC:  No Recipients    If you would like to receive this communication electronically, please contact [email or web address] or [phone] to request more information on [employer] access.    For providers and/or their staff who would like to refer a patient to [facility], please contact us through our one-stop-shop provider referral line, [facility], at [phone].    If you feel you have received this communication in error or would no longer like to receive these types of communications, please e-mail [email or web address]

## 2019-03-06 ENCOUNTER — TELEPHONE (OUTPATIENT)
Dept: UROLOGY | Facility: CLINIC | Age: 74
End: 2019-03-06

## 2019-03-20 LAB
% SATURATION: 21 %
ALBUMIN SERPL-MCNC: 4 G/DL (ref 3.1–4.7)
ALP SERPL-CCNC: 50 IU/L (ref 40–104)
ALT (SGPT): 31 IU/L (ref 3–33)
AST SERPL-CCNC: 32 IU/L (ref 10–40)
BASOPHILS NFR BLD: 0 K/UL (ref 0–0.2)
BASOPHILS NFR BLD: 0.7 %
BILIRUB SERPL-MCNC: 0.7 MG/DL (ref 0.3–1)
BUN SERPL-MCNC: 19 MG/DL (ref 8–20)
CALCIUM SERPL-MCNC: 9.2 MG/DL (ref 7.7–10.4)
CEA: 2.8 NG/ML
CHLORIDE: 97 MMOL/L (ref 98–110)
CO2 SERPL-SCNC: 27.7 MMOL/L (ref 22.8–31.6)
CREATININE: 1.31 MG/DL (ref 0.6–1.4)
EOSINOPHIL NFR BLD: 0.2 K/UL (ref 0–0.7)
EOSINOPHIL NFR BLD: 3.1 %
ERYTHROCYTE [DISTWIDTH] IN BLOOD BY AUTOMATED COUNT: 12.4 % (ref 11.7–14.9)
FERRITIN SERPL-MCNC: 93 NG/ML (ref 37–201)
GLUCOSE: 135 MG/DL (ref 70–99)
GRAN #: 3.2 K/UL (ref 1.4–6.5)
GRAN%: 55.8 %
HCT VFR BLD AUTO: 35.4 % (ref 39–55)
HGB BLD-MCNC: 11.6 G/DL (ref 14–16)
IMMATURE GRANS (ABS): 0 K/UL (ref 0–1)
IMMATURE GRANULOCYTES: 0.2 %
IRON: 58 MCG/DL (ref 32–176)
LYMPH #: 1.8 K/UL (ref 1.2–3.4)
LYMPH%: 31.7 %
MCH RBC QN AUTO: 30.9 PG (ref 25–35)
MCHC RBC AUTO-ENTMCNC: 32.8 G/DL (ref 31–36)
MCV RBC AUTO: 94.1 FL (ref 80–100)
MONO #: 0.5 K/UL (ref 0.1–0.6)
MONO%: 8.5 %
NUCLEATED RBCS: 0 %
PLATELET # BLD AUTO: 255 K/UL (ref 140–440)
PMV BLD AUTO: 9.6 FL (ref 8.8–12.7)
POTASSIUM SERPL-SCNC: 4.3 MMOL/L (ref 3.5–5)
PROT SERPL-MCNC: 6.9 G/DL (ref 6–8.2)
RBC # BLD AUTO: 3.76 M/UL (ref 4.3–5.9)
SODIUM: 136 MMOL/L (ref 134–144)
TOTAL IRON BINDING CAPACITY: 281 MCG/DL (ref 177–435)
TSH SERPL DL<=0.005 MIU/L-ACNC: 3.96 ULU/ML (ref 0.3–5.6)
WBC # BLD AUTO: 5.7 K/UL (ref 5–10)

## 2019-03-21 ENCOUNTER — TELEPHONE (OUTPATIENT)
Dept: HEMATOLOGY/ONCOLOGY | Facility: CLINIC | Age: 74
End: 2019-03-21

## 2019-03-21 NOTE — TELEPHONE ENCOUNTER
Called left message to call me back with chest x ray results     ----- Message from Robert Bonilla MD sent at 3/21/2019  8:20 AM CDT -----  Let him know cxr looks stable

## 2019-03-28 NOTE — TELEPHONE ENCOUNTER
Call placed to inform patient that the Myrbetriq is being sent in to his pharmacy, no answer, message left with call back number.

## 2019-03-28 NOTE — TELEPHONE ENCOUNTER
----- Message from Saad Jones sent at 3/28/2019 12:44 PM CDT -----  Contact: [phone]  Patient is requesting a call back from the nurse stated myrbetriq 50mg is working and requesting a prescription.  Please call the patient upon request at phone number [phone].    Patient will be using   [id number] - [place] - [address]  [address] [address]  Phone: [phone] Fax: [phone]    Thanks!

## 2019-08-05 NOTE — ASSESSMENT & PLAN NOTE
Obtain CT scan with oral contrast  If vomits will need ngt  Gi rest for now  Further recs after scan   not applicable

## 2019-09-10 ENCOUNTER — LAB VISIT (OUTPATIENT)
Dept: LAB | Facility: HOSPITAL | Age: 74
End: 2019-09-10
Attending: INTERNAL MEDICINE
Payer: MEDICARE

## 2019-09-10 DIAGNOSIS — D63.8 CHRONIC DISEASE ANEMIA: ICD-10-CM

## 2019-09-10 DIAGNOSIS — D50.8 IRON DEFICIENCY ANEMIA SECONDARY TO INADEQUATE DIETARY IRON INTAKE: ICD-10-CM

## 2019-09-10 DIAGNOSIS — Z85.038 PERSONAL HISTORY OF COLON CANCER: Primary | ICD-10-CM

## 2019-09-10 DIAGNOSIS — D63.8 ANEMIA, CHRONIC DISEASE: ICD-10-CM

## 2019-09-10 DIAGNOSIS — E53.8 BIOTIN-(PROPIONYL-COA-CARBOXYLASE) LIGASE DEFICIENCY: ICD-10-CM

## 2019-09-10 DIAGNOSIS — Z85.46 PERSONAL HISTORY OF MALIGNANT NEOPLASM OF PROSTATE: ICD-10-CM

## 2019-09-10 DIAGNOSIS — Z85.038 HISTORY OF COLON CANCER: ICD-10-CM

## 2019-09-10 DIAGNOSIS — E53.8 B12 DEFICIENCY: ICD-10-CM

## 2019-09-10 DIAGNOSIS — D50.8 OTHER IRON DEFICIENCY ANEMIA: ICD-10-CM

## 2019-09-10 DIAGNOSIS — Z85.46 HISTORY OF PROSTATE CANCER: ICD-10-CM

## 2019-09-10 LAB
ALBUMIN SERPL BCP-MCNC: 3.9 G/DL (ref 3.5–5.2)
ALP SERPL-CCNC: 50 U/L (ref 55–135)
ALT SERPL W/O P-5'-P-CCNC: 30 U/L (ref 10–44)
ANION GAP SERPL CALC-SCNC: 10 MMOL/L (ref 8–16)
AST SERPL-CCNC: 28 U/L (ref 10–40)
BASOPHILS # BLD AUTO: 0.05 K/UL (ref 0–0.2)
BASOPHILS NFR BLD: 0.7 % (ref 0–1.9)
BILIRUB SERPL-MCNC: 0.8 MG/DL (ref 0.1–1)
BUN SERPL-MCNC: 19 MG/DL (ref 8–23)
CALCIUM SERPL-MCNC: 9.7 MG/DL (ref 8.7–10.5)
CEA SERPL-MCNC: 3 NG/ML (ref 0–5)
CHLORIDE SERPL-SCNC: 104 MMOL/L (ref 95–110)
CO2 SERPL-SCNC: 27 MMOL/L (ref 23–29)
CREAT SERPL-MCNC: 1.2 MG/DL (ref 0.5–1.4)
DIFFERENTIAL METHOD: ABNORMAL
EOSINOPHIL # BLD AUTO: 0.3 K/UL (ref 0–0.5)
EOSINOPHIL NFR BLD: 3.8 % (ref 0–8)
ERYTHROCYTE [DISTWIDTH] IN BLOOD BY AUTOMATED COUNT: 12.3 % (ref 11.5–14.5)
EST. GFR  (AFRICAN AMERICAN): >60 ML/MIN/1.73 M^2
EST. GFR  (NON AFRICAN AMERICAN): 59.6 ML/MIN/1.73 M^2
FERRITIN SERPL-MCNC: 81 NG/ML (ref 20–300)
FOLATE SERPL-MCNC: >24.8 NG/ML (ref 4–24)
GLUCOSE SERPL-MCNC: 95 MG/DL (ref 70–110)
HCT VFR BLD AUTO: 35.1 % (ref 40–54)
HGB BLD-MCNC: 11.7 G/DL (ref 14–18)
IMM GRANULOCYTES # BLD AUTO: 0.03 K/UL (ref 0–0.04)
IMM GRANULOCYTES NFR BLD AUTO: 0.4 % (ref 0–0.5)
IRON SERPL-MCNC: 52 UG/DL (ref 45–160)
LYMPHOCYTES # BLD AUTO: 2.8 K/UL (ref 1–4.8)
LYMPHOCYTES NFR BLD: 38.5 % (ref 18–48)
MCH RBC QN AUTO: 31 PG (ref 27–31)
MCHC RBC AUTO-ENTMCNC: 33.3 G/DL (ref 32–36)
MCV RBC AUTO: 93 FL (ref 82–98)
MONOCYTES # BLD AUTO: 0.5 K/UL (ref 0.3–1)
MONOCYTES NFR BLD: 7.1 % (ref 4–15)
NEUTROPHILS # BLD AUTO: 3.6 K/UL (ref 1.8–7.7)
NEUTROPHILS NFR BLD: 49.5 % (ref 38–73)
NRBC BLD-RTO: 0 /100 WBC
PLATELET # BLD AUTO: 266 K/UL (ref 150–350)
PMV BLD AUTO: 9.7 FL (ref 9.2–12.9)
POTASSIUM SERPL-SCNC: 4.2 MMOL/L (ref 3.5–5.1)
PROT SERPL-MCNC: 6.5 G/DL (ref 6–8.4)
RBC # BLD AUTO: 3.77 M/UL (ref 4.6–6.2)
SATURATED IRON: 18 % (ref 20–50)
SODIUM SERPL-SCNC: 141 MMOL/L (ref 136–145)
TOTAL IRON BINDING CAPACITY: 286 UG/DL (ref 250–450)
TRANSFERRIN SERPL-MCNC: 204 MG/DL (ref 200–375)
VIT B12 SERPL-MCNC: 793 PG/ML (ref 210–950)
WBC # BLD AUTO: 7.32 K/UL (ref 3.9–12.7)

## 2019-09-10 PROCEDURE — 83540 ASSAY OF IRON: CPT

## 2019-09-10 PROCEDURE — 80053 COMPREHEN METABOLIC PANEL: CPT

## 2019-09-10 PROCEDURE — 82746 ASSAY OF FOLIC ACID SERUM: CPT

## 2019-09-10 PROCEDURE — 82728 ASSAY OF FERRITIN: CPT

## 2019-09-10 PROCEDURE — 82607 VITAMIN B-12: CPT

## 2019-09-10 PROCEDURE — 85025 COMPLETE CBC W/AUTO DIFF WBC: CPT

## 2019-09-10 PROCEDURE — 36415 COLL VENOUS BLD VENIPUNCTURE: CPT

## 2019-09-10 PROCEDURE — 82378 CARCINOEMBRYONIC ANTIGEN: CPT

## 2019-09-16 NOTE — PROGRESS NOTES
[facility] Hematology/Oncology  PROGRESS NOTE      Subjective:       Patient ID:   NAME: Bret Garcia : 1945     74 y.o. male    Referring Doc: Bailey  Other Physicians: Richard, Carlitos Brown Finger    Chief Complaint:  Colon ca f/u    History of Present Illness:     Patient returns today for a regularly scheduled follow-up visit.  The patient is doing ok. No new issues. He denies any CP, SOB, HA's or N/V. He reports that his bowels normal. He is here by himself.             ROS:   GEN: normal without any fever, night sweats or weight loss  HEENT: normal with no HA's, sore throat, stiff neck, changes in vision  CV: normal with no CP, SOB, PND, THURMAN or orthopnea  PULM: normal with no SOB, cough, hemoptysis, sputum or pleuritic pain  GI: normal with no abdominal pain, nausea, vomiting, constipation, diarrhea, melanotic stools, BRBPR, or hematemesis  : normal with no hematuria, dysuria  BREAST: normal with no mass, discharge, pain  SKIN: normal with no rash, erythema, bruising, or swelling    Allergies:  Review of patient's allergies indicates:   Allergen Reactions    Codeine Other (See Comments)     Other reaction(s): Rash  hallucinations    Penicillins Other (See Comments)     Other reaction(s): Shortness of breath  Other reaction(s): Itching  Unknown/as a child       Medications:    Current Outpatient Medications:     aspirin (ECOTRIN) 81 MG EC tablet, Take 81 mg by mouth once daily., Disp: , Rfl:     atorvastatin (LIPITOR) 20 MG tablet, , Disp: , Rfl:     benazepril (LOTENSIN) 20 MG tablet, Take 20 mg by mouth once daily., Disp: , Rfl: 1    BYDUREON 2 mg SSRR, Inject 2 mGy as directed once a week. , Disp: , Rfl: 1    DULoxetine (CYMBALTA) 30 MG capsule, Take 30 mg by mouth once daily., Disp: , Rfl: 1    duloxetine (CYMBALTA) 60 MG capsule, Take 60 mg by mouth every evening.  , Disp: , Rfl:     exenatide (BYETTA) 5 mcg/dose (250 mcg/mL) 1.2 mL injection, Byetta 5 mcg/dose (250 mcg/mL)1.2 mL  subcutaneous pen injector, Disp: , Rfl:     levothyroxine (SYNTHROID) 75 MCG tablet, Take 1 tablet by mouth., Disp: , Rfl:     metformin (GLUCOPHAGE-XR) 500 MG 24 hr tablet, bid, Disp: , Rfl: 1    mirabegron (MYRBETRIQ) 50 mg Tb24, Take 1 tablet (50 mg total) by mouth once daily., Disp: 30 tablet, Rfl: 11    olanzapine (ZYPREXA) 5 MG tablet, Take 1 tablet by mouth., Disp: , Rfl:     omeprazole (PRILOSEC) 40 MG capsule, Take 40 mg by mouth once daily., Disp: , Rfl: 1    pregabalin (LYRICA) 75 MG capsule, Lyrica 75 mg capsule  TAKE 1-2 CAPSULE(S) BY MOUTH AT BEDTIME, Disp: , Rfl:     PMHx/PSHx Updates:  See patient's last visit with me on 2/14/2019  See H&P on Vol #1        Pathology:  See vol #1        Objective:     Vitals:  Blood pressure 101/69, pulse 98, temperature 97.6 °F (36.4 °C), resp. rate 18, weight 79.5 kg (175 lb 4.8 oz).    Physical Examination:   GEN: no apparent distress, comfortable; AAOx3  HEAD: atraumatic and normocephalic  EYES: no pallor, no icterus, PERRLA  ENT: OMM, no pharyngeal erythema, external ears WNL; no nasal discharge; no thrush  NECK: no masses, thyroid normal, trachea midline, no LAD/LN's, supple  CV: RRR with no murmur; normal pulse; normal S1 and S2; no pedal edema  CHEST: Normal respiratory effort; CTAB; normal breath sounds; no wheeze or crackles  ABDOM: nontender and nondistended; soft; normal bowel sounds; no rebound/guarding  MUSC/Skeletal: ROM normal; no crepitus; joints normal; no deformities or arthropathy  EXTREM: no clubbing, cyanosis, inflammation or swelling  SKIN: no rashes, lesions, ulcers, petechiae or subcutaneous nodules  : no lawson  NEURO: grossly intact; motor/sensory WNL; AAOx3; no tremors  PSYCH: normal mood, affect and behavior  LYMPH: normal cervical, supraclavicular, axillary and groin LN's            Labs:       9/10/2019  Lab Results   Component Value Date    WBC 7.32 09/10/2019    HGB 11.7 (L) 09/10/2019    HCT 35.1 (L) 09/10/2019    MCV 93  09/10/2019     09/10/2019     BMP  Lab Results   Component Value Date     09/10/2019    K 4.2 09/10/2019     09/10/2019    CO2 27 09/10/2019    BUN 19 09/10/2019    CREATININE 1.2 09/10/2019    CALCIUM 9.7 09/10/2019    ANIONGAP 10 09/10/2019    ESTGFRAFRICA >60.0 09/10/2019    EGFRNONAA 59.6 (A) 09/10/2019     Lab Results   Component Value Date    ALT 30 09/10/2019    AST 28 09/10/2019    ALKPHOS 50 (L) 09/10/2019    BILITOT 0.8 09/10/2019     Lab Results   Component Value Date    CEA 3.0 09/10/2019       Lab Results   Component Value Date    IRON 52 09/10/2019    TIBC 286 09/10/2019    FERRITIN 81 09/10/2019     Lab Results   Component Value Date    UYOJLVAG80 793 09/10/2019     Lab Results   Component Value Date    FOLATE >24.8 (H) 09/10/2019           Radiology/Diagnostic Studies:    CXR  3/20/2019:    IMPRESSION:    Unchanged mild elevation of the left hemidiaphragm.    Basilar platelike atelectasis or scarring.            11/8/2017  CT abdom:  Impression       1.  Acute, partial small bowel obstruction with a relative zone of transition in the mid abdomen near the site of a small, midline ventral abdominal hernia with herniation of a short segment of small bowel located just inferior to the patient's ventral abdominal mesh. No signs of strangulation noted. There is a moderate volume of colonic fecal material and gas noted.    2. Additional findings:  -Prior prostatectomy and artificial urinary sphincter placement.  -Probable prior right hemicolectomy.  -Mild left basilar atelectasis and elevated left hemidiaphragm.         I have reviewed all available lab results and radiology reports.    Assessment/Plan:   (1) 74 y.o. male with diagnosis of colon cancer in 2005  - followed by Dr Ramos with GI with recent colonoscopy on Aug 6th 2018 with two polyps removed  - prior partial SBO in Nov 2016  - latest CEA is at 3.0      (2) Prior pseudotumor of lung - s/p resection   - followed by Dr Brown  with Pulm    (3) Prior prostate CA - followed by Dr Urbina with     (4) Steatosis of liver with chronic LFT elevations - also followed by Dr Ramos with GI  - he had an SBO in Nov 2017 and was hospitalized at [facility]    (5) Multifactorial mild anemia - chronic disease, chronic renal and iron deficiency components  - latest hgb adequate at 11.7  - iron and ferritin good    (6) Mild B12 deficiency - on OTC B12 - level at 793 and good    (7) CRI - followed by Dr Patton    (8) S/p left rotator cuff in March 2018 with Dr Moura    1. History of colon cancer     2. Anemia, chronic disease     3. History of prostate cancer     4. Other iron deficiency anemia     5. B12 deficiency             PLAN:  1. Repeat CXR in March 2020  2. Continue to check basic labs and CEA q 6 months   3. F/U with PCP, GI, Pulm and   4. RTC in 6 months  Fax note to Pooja Avalos Romano, Dale, Richard    I spent over 25 mins with the patient, of which over half was face to face with the patient. Reviewing materials, labs, reports and studies. Making treatment and analytical decisions. Ordering necessary labs, tests and studies.      Discussion:     I have explained all of the above in detail and the patient understands all of the current recommendation(s). I have answered all of their questions to the best of my ability and to their complete satisfaction.   The patient is to continue with the current management plan.            Electronically signed by Robert Bonilla MD

## 2019-09-17 ENCOUNTER — OFFICE VISIT (OUTPATIENT)
Dept: HEMATOLOGY/ONCOLOGY | Facility: CLINIC | Age: 74
End: 2019-09-17
Payer: MEDICARE

## 2019-09-17 VITALS
RESPIRATION RATE: 18 BRPM | SYSTOLIC BLOOD PRESSURE: 101 MMHG | HEART RATE: 98 BPM | TEMPERATURE: 98 F | DIASTOLIC BLOOD PRESSURE: 69 MMHG | WEIGHT: 175.31 LBS | BODY MASS INDEX: 31.05 KG/M2

## 2019-09-17 DIAGNOSIS — E53.8 B12 DEFICIENCY: ICD-10-CM

## 2019-09-17 DIAGNOSIS — Z85.46 HISTORY OF PROSTATE CANCER: ICD-10-CM

## 2019-09-17 DIAGNOSIS — D50.8 OTHER IRON DEFICIENCY ANEMIA: ICD-10-CM

## 2019-09-17 DIAGNOSIS — D63.8 ANEMIA, CHRONIC DISEASE: ICD-10-CM

## 2019-09-17 DIAGNOSIS — Z85.038 HISTORY OF COLON CANCER: Primary | ICD-10-CM

## 2019-09-17 PROCEDURE — 99214 OFFICE O/P EST MOD 30 MIN: CPT | Mod: S$GLB,,, | Performed by: INTERNAL MEDICINE

## 2019-09-17 PROCEDURE — 99214 PR OFFICE/OUTPT VISIT, EST, LEVL IV, 30-39 MIN: ICD-10-PCS | Mod: S$GLB,,, | Performed by: INTERNAL MEDICINE

## 2019-10-04 ENCOUNTER — LAB VISIT (OUTPATIENT)
Dept: LAB | Facility: HOSPITAL | Age: 74
End: 2019-10-04
Attending: INTERNAL MEDICINE
Payer: MEDICARE

## 2019-10-04 DIAGNOSIS — E55.9 AVITAMINOSIS D: ICD-10-CM

## 2019-10-04 DIAGNOSIS — N18.9 ANEMIA OF CHRONIC RENAL FAILURE: ICD-10-CM

## 2019-10-04 DIAGNOSIS — I10 ESSENTIAL HYPERTENSION, MALIGNANT: ICD-10-CM

## 2019-10-04 DIAGNOSIS — I12.9 PARENCHYMAL RENAL HYPERTENSION: ICD-10-CM

## 2019-10-04 DIAGNOSIS — N18.2 CHRONIC KIDNEY DISEASE, STAGE II (MILD): ICD-10-CM

## 2019-10-04 DIAGNOSIS — E11.9 DIABETES MELLITUS WITHOUT COMPLICATION: ICD-10-CM

## 2019-10-04 DIAGNOSIS — D63.1 ANEMIA OF CHRONIC RENAL FAILURE: ICD-10-CM

## 2019-10-04 DIAGNOSIS — N18.9 ANEMIA OF CHRONIC RENAL FAILURE: Primary | ICD-10-CM

## 2019-10-04 DIAGNOSIS — R05.9 COUGH: ICD-10-CM

## 2019-10-04 DIAGNOSIS — E78.2 MIXED HYPERLIPIDEMIA: ICD-10-CM

## 2019-10-04 DIAGNOSIS — E66.9 OBESITY, UNSPECIFIED: ICD-10-CM

## 2019-10-04 DIAGNOSIS — E78.2 MIXED HYPERLIPIDEMIA: Primary | ICD-10-CM

## 2019-10-04 DIAGNOSIS — D63.1 ANEMIA OF CHRONIC RENAL FAILURE: Primary | ICD-10-CM

## 2019-10-04 LAB
ALBUMIN/CREAT UR: 16.1 UG/MG (ref 0–30)
BILIRUB UR QL STRIP: NEGATIVE
CLARITY UR: CLEAR
COLOR UR: YELLOW
CREAT UR-MCNC: 230 MG/DL (ref 23–375)
CREAT UR-MCNC: 230 MG/DL (ref 23–375)
GLUCOSE UR QL STRIP: NEGATIVE
HGB UR QL STRIP: NEGATIVE
KETONES UR QL STRIP: NEGATIVE
LEUKOCYTE ESTERASE UR QL STRIP: NEGATIVE
MICROALBUMIN UR DL<=1MG/L-MCNC: 37 UG/ML
NITRITE UR QL STRIP: NEGATIVE
PH UR STRIP: 6 [PH] (ref 5–8)
PROT UR QL STRIP: ABNORMAL
PROT UR-MCNC: 17 MG/DL (ref 0–15)
PROT/CREAT UR: 0.07 MG/G{CREAT} (ref 0–0.2)
SP GR UR STRIP: 1.02 (ref 1–1.03)
URN SPEC COLLECT METH UR: ABNORMAL
UROBILINOGEN UR STRIP-ACNC: NEGATIVE EU/DL

## 2019-10-04 PROCEDURE — 84156 ASSAY OF PROTEIN URINE: CPT

## 2019-10-04 PROCEDURE — 82043 UR ALBUMIN QUANTITATIVE: CPT

## 2019-10-04 PROCEDURE — 81003 URINALYSIS AUTO W/O SCOPE: CPT

## 2020-03-03 RX ORDER — MIRABEGRON 50 MG/1
TABLET, FILM COATED, EXTENDED RELEASE ORAL
Qty: 30 TABLET | Refills: 11 | Status: SHIPPED | OUTPATIENT
Start: 2020-03-03 | End: 2021-05-11

## 2020-03-10 ENCOUNTER — HOSPITAL ENCOUNTER (OUTPATIENT)
Dept: RADIOLOGY | Facility: HOSPITAL | Age: 75
Discharge: HOME | End: 2020-03-10
Attending: INTERNAL MEDICINE
Payer: MEDICARE

## 2020-03-10 DIAGNOSIS — D50.8 OTHER IRON DEFICIENCY ANEMIA: ICD-10-CM

## 2020-03-10 DIAGNOSIS — E53.8 B12 DEFICIENCY: ICD-10-CM

## 2020-03-10 DIAGNOSIS — Z85.038 HISTORY OF COLON CANCER: ICD-10-CM

## 2020-03-10 DIAGNOSIS — Z85.46 HISTORY OF PROSTATE CANCER: ICD-10-CM

## 2020-03-10 DIAGNOSIS — D63.8 ANEMIA, CHRONIC DISEASE: ICD-10-CM

## 2020-03-10 PROCEDURE — 71046 X-RAY EXAM CHEST 2 VIEWS: CPT | Mod: TC,PO

## 2020-03-12 ENCOUNTER — TELEPHONE (OUTPATIENT)
Dept: HEMATOLOGY/ONCOLOGY | Facility: CLINIC | Age: 75
End: 2020-03-12

## 2020-03-12 ENCOUNTER — LAB VISIT (OUTPATIENT)
Dept: LAB | Facility: HOSPITAL | Age: 75
End: 2020-03-12
Attending: INTERNAL MEDICINE
Payer: MEDICARE

## 2020-03-12 DIAGNOSIS — Z85.46 HISTORY OF PROSTATE CANCER: ICD-10-CM

## 2020-03-12 DIAGNOSIS — D63.8 ANEMIA, CHRONIC DISEASE: ICD-10-CM

## 2020-03-12 DIAGNOSIS — D50.8 OTHER IRON DEFICIENCY ANEMIA: ICD-10-CM

## 2020-03-12 DIAGNOSIS — Z85.038 HISTORY OF COLON CANCER: ICD-10-CM

## 2020-03-12 DIAGNOSIS — E53.8 B12 DEFICIENCY: ICD-10-CM

## 2020-03-12 DIAGNOSIS — Z85.038 HISTORY OF COLON CANCER: Primary | ICD-10-CM

## 2020-03-12 LAB
CEA SERPL-MCNC: 3.3 NG/ML (ref 0–5)
FERRITIN SERPL-MCNC: 82 NG/ML (ref 20–300)
FOLATE SERPL-MCNC: >24.8 NG/ML (ref 4–24)
IRON SERPL-MCNC: 99 UG/DL (ref 45–160)
SATURATED IRON: 34 % (ref 20–50)
TOTAL IRON BINDING CAPACITY: 291 UG/DL (ref 250–450)
TRANSFERRIN SERPL-MCNC: 208 MG/DL (ref 200–375)
VIT B12 SERPL-MCNC: 443 PG/ML (ref 210–950)

## 2020-03-12 PROCEDURE — 82607 VITAMIN B-12: CPT

## 2020-03-12 PROCEDURE — 82746 ASSAY OF FOLIC ACID SERUM: CPT

## 2020-03-12 PROCEDURE — 82378 CARCINOEMBRYONIC ANTIGEN: CPT

## 2020-03-12 PROCEDURE — 82728 ASSAY OF FERRITIN: CPT

## 2020-03-12 PROCEDURE — 36415 COLL VENOUS BLD VENIPUNCTURE: CPT

## 2020-03-12 PROCEDURE — 83540 ASSAY OF IRON: CPT

## 2020-03-12 NOTE — TELEPHONE ENCOUNTER
Patient's wife called and instructed me that they don't have any paperwork for labs.  Re-ordered labs Folate, B-12, Ferritin, Iron/TIBC and CEA.  Patient had a CBC/CMP on the 10th.

## 2020-04-07 ENCOUNTER — HOSPITAL ENCOUNTER (INPATIENT)
Facility: HOSPITAL | Age: 75
LOS: 4 days | Discharge: HOME HEALTH | DRG: 193 | End: 2020-04-11
Attending: EMERGENCY MEDICINE | Admitting: INTERNAL MEDICINE
Payer: MEDICARE

## 2020-04-07 DIAGNOSIS — Z20.822 SUSPECTED COVID-19 VIRUS INFECTION: ICD-10-CM

## 2020-04-07 DIAGNOSIS — Z91.89 AT RISK FOR LONG QT SYNDROME: ICD-10-CM

## 2020-04-07 DIAGNOSIS — J18.9 PNEUMONIA OF BOTH LOWER LOBES DUE TO INFECTIOUS ORGANISM: Primary | ICD-10-CM

## 2020-04-07 PROBLEM — J96.01 ACUTE HYPOXEMIC RESPIRATORY FAILURE: Status: ACTIVE | Noted: 2020-04-07

## 2020-04-07 LAB
ALBUMIN SERPL BCP-MCNC: 3.6 G/DL (ref 3.5–5.2)
ALP SERPL-CCNC: 45 U/L (ref 55–135)
ALT SERPL W/O P-5'-P-CCNC: 54 U/L (ref 10–44)
ANION GAP SERPL CALC-SCNC: 13 MMOL/L (ref 8–16)
AST SERPL-CCNC: 62 U/L (ref 10–40)
BASOPHILS # BLD AUTO: 0.03 K/UL (ref 0–0.2)
BASOPHILS NFR BLD: 0.5 % (ref 0–1.9)
BILIRUB SERPL-MCNC: 0.8 MG/DL (ref 0.1–1)
BUN SERPL-MCNC: 19 MG/DL (ref 8–23)
CALCIUM SERPL-MCNC: 9.1 MG/DL (ref 8.7–10.5)
CHLORIDE SERPL-SCNC: 97 MMOL/L (ref 95–110)
CK SERPL-CCNC: 622 U/L (ref 20–200)
CO2 SERPL-SCNC: 26 MMOL/L (ref 23–29)
CREAT SERPL-MCNC: 1.3 MG/DL (ref 0.5–1.4)
CRP SERPL-MCNC: 3.54 MG/DL (ref 0–0.75)
CRP SERPL-MCNC: 3.71 MG/DL (ref 0–0.75)
DIFFERENTIAL METHOD: ABNORMAL
EOSINOPHIL # BLD AUTO: 0.1 K/UL (ref 0–0.5)
EOSINOPHIL NFR BLD: 2.3 % (ref 0–8)
ERYTHROCYTE [DISTWIDTH] IN BLOOD BY AUTOMATED COUNT: 12.3 % (ref 11.5–14.5)
EST. GFR  (AFRICAN AMERICAN): >60 ML/MIN/1.73 M^2
EST. GFR  (NON AFRICAN AMERICAN): 54.1 ML/MIN/1.73 M^2
FERRITIN SERPL-MCNC: 255 NG/ML (ref 20–300)
GLUCOSE SERPL-MCNC: 174 MG/DL (ref 70–110)
HCT VFR BLD AUTO: 32.7 % (ref 40–54)
HGB BLD-MCNC: 10.8 G/DL (ref 14–18)
IMM GRANULOCYTES # BLD AUTO: 0.01 K/UL (ref 0–0.04)
IMM GRANULOCYTES NFR BLD AUTO: 0.2 % (ref 0–0.5)
LACTATE SERPL-SCNC: 1.8 MMOL/L (ref 0.5–1.9)
LACTATE SERPL-SCNC: 2 MMOL/L (ref 0.5–1.9)
LDH SERPL L TO P-CCNC: 267 U/L (ref 110–260)
LYMPHOCYTES # BLD AUTO: 1.2 K/UL (ref 1–4.8)
LYMPHOCYTES NFR BLD: 21.5 % (ref 18–48)
MCH RBC QN AUTO: 30.7 PG (ref 27–31)
MCHC RBC AUTO-ENTMCNC: 33 G/DL (ref 32–36)
MCV RBC AUTO: 93 FL (ref 82–98)
MONOCYTES # BLD AUTO: 0.6 K/UL (ref 0.3–1)
MONOCYTES NFR BLD: 9.5 % (ref 4–15)
NEUTROPHILS # BLD AUTO: 3.8 K/UL (ref 1.8–7.7)
NEUTROPHILS NFR BLD: 66 % (ref 38–73)
NRBC BLD-RTO: 0 /100 WBC
PLATELET # BLD AUTO: 248 K/UL (ref 150–350)
PMV BLD AUTO: 9.8 FL (ref 9.2–12.9)
POTASSIUM SERPL-SCNC: 3.9 MMOL/L (ref 3.5–5.1)
PROT SERPL-MCNC: 6.7 G/DL (ref 6–8.4)
RBC # BLD AUTO: 3.52 M/UL (ref 4.6–6.2)
SODIUM SERPL-SCNC: 136 MMOL/L (ref 136–145)
TROPONIN I SERPL DL<=0.01 NG/ML-MCNC: <0.03 NG/ML
WBC # BLD AUTO: 5.77 K/UL (ref 3.9–12.7)

## 2020-04-07 PROCEDURE — 86140 C-REACTIVE PROTEIN: CPT

## 2020-04-07 PROCEDURE — 25000003 PHARM REV CODE 250: Performed by: INTERNAL MEDICINE

## 2020-04-07 PROCEDURE — 12000002 HC ACUTE/MED SURGE SEMI-PRIVATE ROOM

## 2020-04-07 PROCEDURE — 85025 COMPLETE CBC W/AUTO DIFF WBC: CPT

## 2020-04-07 PROCEDURE — 83605 ASSAY OF LACTIC ACID: CPT | Mod: 91

## 2020-04-07 PROCEDURE — 84484 ASSAY OF TROPONIN QUANT: CPT

## 2020-04-07 PROCEDURE — U0002 COVID-19 LAB TEST NON-CDC: HCPCS

## 2020-04-07 PROCEDURE — 80053 COMPREHEN METABOLIC PANEL: CPT

## 2020-04-07 PROCEDURE — 83615 LACTATE (LD) (LDH) ENZYME: CPT

## 2020-04-07 PROCEDURE — 82550 ASSAY OF CK (CPK): CPT

## 2020-04-07 PROCEDURE — 36415 COLL VENOUS BLD VENIPUNCTURE: CPT

## 2020-04-07 PROCEDURE — 82728 ASSAY OF FERRITIN: CPT

## 2020-04-07 PROCEDURE — 63600175 PHARM REV CODE 636 W HCPCS: Performed by: INTERNAL MEDICINE

## 2020-04-07 PROCEDURE — 99900035 HC TECH TIME PER 15 MIN (STAT)

## 2020-04-07 PROCEDURE — 86140 C-REACTIVE PROTEIN: CPT | Mod: 91

## 2020-04-07 PROCEDURE — 93005 ELECTROCARDIOGRAM TRACING: CPT | Performed by: INTERNAL MEDICINE

## 2020-04-07 PROCEDURE — 83605 ASSAY OF LACTIC ACID: CPT

## 2020-04-07 PROCEDURE — 87040 BLOOD CULTURE FOR BACTERIA: CPT

## 2020-04-07 PROCEDURE — 99285 EMERGENCY DEPT VISIT HI MDM: CPT | Mod: 25

## 2020-04-07 RX ORDER — POTASSIUM CHLORIDE 20 MEQ/1
20 TABLET, EXTENDED RELEASE ORAL
Status: DISCONTINUED | OUTPATIENT
Start: 2020-04-07 | End: 2020-04-11 | Stop reason: HOSPADM

## 2020-04-07 RX ORDER — ASPIRIN 81 MG/1
81 TABLET ORAL DAILY
Status: DISCONTINUED | OUTPATIENT
Start: 2020-04-08 | End: 2020-04-11 | Stop reason: HOSPADM

## 2020-04-07 RX ORDER — POTASSIUM CHLORIDE 20 MEQ/1
40 TABLET, EXTENDED RELEASE ORAL
Status: DISCONTINUED | OUTPATIENT
Start: 2020-04-07 | End: 2020-04-11 | Stop reason: HOSPADM

## 2020-04-07 RX ORDER — CALCIUM CHLORIDE IN 0.9 % NACL 1 G/100 ML
1 INTRAVENOUS SOLUTION, PIGGYBACK (ML) INTRAVENOUS
Status: DISCONTINUED | OUTPATIENT
Start: 2020-04-07 | End: 2020-04-11 | Stop reason: HOSPADM

## 2020-04-07 RX ORDER — HYDROXYCHLOROQUINE SULFATE 200 MG/1
400 TABLET, FILM COATED ORAL DAILY
Status: DISCONTINUED | OUTPATIENT
Start: 2020-04-09 | End: 2020-04-09

## 2020-04-07 RX ORDER — MAGNESIUM SULFATE HEPTAHYDRATE 40 MG/ML
2 INJECTION, SOLUTION INTRAVENOUS
Status: DISCONTINUED | OUTPATIENT
Start: 2020-04-07 | End: 2020-04-11 | Stop reason: HOSPADM

## 2020-04-07 RX ORDER — IBUPROFEN 200 MG
16 TABLET ORAL
Status: DISCONTINUED | OUTPATIENT
Start: 2020-04-07 | End: 2020-04-11 | Stop reason: HOSPADM

## 2020-04-07 RX ORDER — ONDANSETRON 2 MG/ML
4 INJECTION INTRAMUSCULAR; INTRAVENOUS EVERY 6 HOURS PRN
Status: DISCONTINUED | OUTPATIENT
Start: 2020-04-07 | End: 2020-04-11 | Stop reason: HOSPADM

## 2020-04-07 RX ORDER — PANTOPRAZOLE SODIUM 40 MG/1
40 TABLET, DELAYED RELEASE ORAL DAILY
Status: DISCONTINUED | OUTPATIENT
Start: 2020-04-08 | End: 2020-04-11 | Stop reason: HOSPADM

## 2020-04-07 RX ORDER — ATORVASTATIN CALCIUM 20 MG/1
20 TABLET, FILM COATED ORAL NIGHTLY
Status: DISCONTINUED | OUTPATIENT
Start: 2020-04-07 | End: 2020-04-11 | Stop reason: HOSPADM

## 2020-04-07 RX ORDER — POTASSIUM CHLORIDE 7.45 MG/ML
40 INJECTION INTRAVENOUS
Status: DISCONTINUED | OUTPATIENT
Start: 2020-04-07 | End: 2020-04-11 | Stop reason: HOSPADM

## 2020-04-07 RX ORDER — ALBUTEROL SULFATE 90 UG/1
4 AEROSOL, METERED RESPIRATORY (INHALATION) EVERY 8 HOURS
Status: DISCONTINUED | OUTPATIENT
Start: 2020-04-08 | End: 2020-04-07

## 2020-04-07 RX ORDER — PREGABALIN 75 MG/1
150 CAPSULE ORAL NIGHTLY
Status: DISCONTINUED | OUTPATIENT
Start: 2020-04-07 | End: 2020-04-11 | Stop reason: HOSPADM

## 2020-04-07 RX ORDER — GLUCAGON 1 MG
1 KIT INJECTION
Status: DISCONTINUED | OUTPATIENT
Start: 2020-04-07 | End: 2020-04-11 | Stop reason: HOSPADM

## 2020-04-07 RX ORDER — HYDROXYCHLOROQUINE SULFATE 200 MG/1
400 TABLET, FILM COATED ORAL 2 TIMES DAILY
Status: COMPLETED | OUTPATIENT
Start: 2020-04-07 | End: 2020-04-08

## 2020-04-07 RX ORDER — PREGABALIN 75 MG/1
150 CAPSULE ORAL NIGHTLY
COMMUNITY
End: 2022-01-01 | Stop reason: SDUPTHER

## 2020-04-07 RX ORDER — IBUPROFEN 200 MG
24 TABLET ORAL
Status: DISCONTINUED | OUTPATIENT
Start: 2020-04-07 | End: 2020-04-11 | Stop reason: HOSPADM

## 2020-04-07 RX ORDER — ALBUTEROL SULFATE 90 UG/1
2 AEROSOL, METERED RESPIRATORY (INHALATION) EVERY 8 HOURS
Status: DISCONTINUED | OUTPATIENT
Start: 2020-04-08 | End: 2020-04-08

## 2020-04-07 RX ORDER — ACETAMINOPHEN 325 MG/1
650 TABLET ORAL EVERY 4 HOURS PRN
Status: DISCONTINUED | OUTPATIENT
Start: 2020-04-07 | End: 2020-04-11 | Stop reason: HOSPADM

## 2020-04-07 RX ORDER — MAGNESIUM SULFATE HEPTAHYDRATE 40 MG/ML
4 INJECTION, SOLUTION INTRAVENOUS
Status: DISCONTINUED | OUTPATIENT
Start: 2020-04-07 | End: 2020-04-11 | Stop reason: HOSPADM

## 2020-04-07 RX ORDER — BENZONATATE 100 MG/1
200 CAPSULE ORAL 3 TIMES DAILY PRN
Status: DISCONTINUED | OUTPATIENT
Start: 2020-04-07 | End: 2020-04-11 | Stop reason: HOSPADM

## 2020-04-07 RX ORDER — INSULIN ASPART 100 [IU]/ML
1-10 INJECTION, SOLUTION INTRAVENOUS; SUBCUTANEOUS
Status: DISCONTINUED | OUTPATIENT
Start: 2020-04-07 | End: 2020-04-11 | Stop reason: HOSPADM

## 2020-04-07 RX ORDER — AZITHROMYCIN 250 MG/1
250 TABLET, FILM COATED ORAL DAILY
Status: DISCONTINUED | OUTPATIENT
Start: 2020-04-07 | End: 2020-04-09

## 2020-04-07 RX ORDER — TALC
6 POWDER (GRAM) TOPICAL NIGHTLY PRN
Status: DISCONTINUED | OUTPATIENT
Start: 2020-04-07 | End: 2020-04-11 | Stop reason: HOSPADM

## 2020-04-07 RX ORDER — BENAZEPRIL HYDROCHLORIDE 20 MG/1
20 TABLET ORAL DAILY
Status: DISCONTINUED | OUTPATIENT
Start: 2020-04-08 | End: 2020-04-11 | Stop reason: HOSPADM

## 2020-04-07 RX ORDER — LEVOTHYROXINE SODIUM 25 UG/1
75 TABLET ORAL
Status: DISCONTINUED | OUTPATIENT
Start: 2020-04-08 | End: 2020-04-11 | Stop reason: HOSPADM

## 2020-04-07 RX ORDER — OLANZAPINE 5 MG/1
5 TABLET ORAL DAILY
Status: DISCONTINUED | OUTPATIENT
Start: 2020-04-08 | End: 2020-04-11 | Stop reason: HOSPADM

## 2020-04-07 RX ORDER — LANOLIN ALCOHOL/MO/W.PET/CERES
800 CREAM (GRAM) TOPICAL
Status: DISCONTINUED | OUTPATIENT
Start: 2020-04-07 | End: 2020-04-11 | Stop reason: HOSPADM

## 2020-04-07 RX ORDER — POTASSIUM CHLORIDE 7.45 MG/ML
20 INJECTION INTRAVENOUS
Status: DISCONTINUED | OUTPATIENT
Start: 2020-04-07 | End: 2020-04-11 | Stop reason: HOSPADM

## 2020-04-07 RX ORDER — SODIUM CHLORIDE 0.9 % (FLUSH) 0.9 %
10 SYRINGE (ML) INJECTION
Status: DISCONTINUED | OUTPATIENT
Start: 2020-04-07 | End: 2020-04-11 | Stop reason: HOSPADM

## 2020-04-07 RX ORDER — ENOXAPARIN SODIUM 100 MG/ML
40 INJECTION SUBCUTANEOUS EVERY 24 HOURS
Status: DISCONTINUED | OUTPATIENT
Start: 2020-04-07 | End: 2020-04-11 | Stop reason: HOSPADM

## 2020-04-07 RX ORDER — MAGNESIUM SULFATE 1 G/100ML
1 INJECTION INTRAVENOUS
Status: DISCONTINUED | OUTPATIENT
Start: 2020-04-07 | End: 2020-04-11 | Stop reason: HOSPADM

## 2020-04-07 RX ADMIN — ENOXAPARIN SODIUM 40 MG: 100 INJECTION SUBCUTANEOUS at 09:04

## 2020-04-07 RX ADMIN — PREGABALIN 150 MG: 75 CAPSULE ORAL at 09:04

## 2020-04-07 RX ADMIN — ATORVASTATIN CALCIUM 20 MG: 20 TABLET, FILM COATED ORAL at 09:04

## 2020-04-07 RX ADMIN — HYDROXYCHLOROQUINE SULFATE 400 MG: 200 TABLET, FILM COATED ORAL at 09:04

## 2020-04-08 LAB
ALBUMIN SERPL BCP-MCNC: 3.4 G/DL (ref 3.5–5.2)
ALP SERPL-CCNC: 47 U/L (ref 55–135)
ALT SERPL W/O P-5'-P-CCNC: 48 U/L (ref 10–44)
ANION GAP SERPL CALC-SCNC: 9 MMOL/L (ref 8–16)
AST SERPL-CCNC: 50 U/L (ref 10–40)
BILIRUB SERPL-MCNC: 0.7 MG/DL (ref 0.1–1)
BUN SERPL-MCNC: 18 MG/DL (ref 8–23)
CALCIUM SERPL-MCNC: 8.9 MG/DL (ref 8.7–10.5)
CHLORIDE SERPL-SCNC: 98 MMOL/L (ref 95–110)
CK SERPL-CCNC: 415 U/L (ref 20–200)
CO2 SERPL-SCNC: 29 MMOL/L (ref 23–29)
CREAT SERPL-MCNC: 1.2 MG/DL (ref 0.5–1.4)
EST. GFR  (AFRICAN AMERICAN): >60 ML/MIN/1.73 M^2
EST. GFR  (NON AFRICAN AMERICAN): 59.6 ML/MIN/1.73 M^2
GLUCOSE SERPL-MCNC: 120 MG/DL (ref 70–110)
GLUCOSE SERPL-MCNC: 165 MG/DL (ref 70–110)
GLUCOSE SERPL-MCNC: 168 MG/DL (ref 70–110)
GLUCOSE SERPL-MCNC: 200 MG/DL (ref 70–110)
GLUCOSE SERPL-MCNC: 201 MG/DL (ref 70–110)
POTASSIUM SERPL-SCNC: 4.4 MMOL/L (ref 3.5–5.1)
PROCALCITONIN SERPL IA-MCNC: 0.07 NG/ML (ref 0–0.5)
PROT SERPL-MCNC: 6.5 G/DL (ref 6–8.4)
SODIUM SERPL-SCNC: 136 MMOL/L (ref 136–145)

## 2020-04-08 PROCEDURE — 80053 COMPREHEN METABOLIC PANEL: CPT

## 2020-04-08 PROCEDURE — 94640 AIRWAY INHALATION TREATMENT: CPT

## 2020-04-08 PROCEDURE — 25000242 PHARM REV CODE 250 ALT 637 W/ HCPCS: Performed by: INTERNAL MEDICINE

## 2020-04-08 PROCEDURE — 99900035 HC TECH TIME PER 15 MIN (STAT)

## 2020-04-08 PROCEDURE — 84145 PROCALCITONIN (PCT): CPT

## 2020-04-08 PROCEDURE — 36415 COLL VENOUS BLD VENIPUNCTURE: CPT

## 2020-04-08 PROCEDURE — 25000003 PHARM REV CODE 250: Performed by: INTERNAL MEDICINE

## 2020-04-08 PROCEDURE — 63700000 PHARM REV CODE 250 ALT 637 W/O HCPCS: Performed by: INTERNAL MEDICINE

## 2020-04-08 PROCEDURE — 94761 N-INVAS EAR/PLS OXIMETRY MLT: CPT

## 2020-04-08 PROCEDURE — 63600175 PHARM REV CODE 636 W HCPCS: Performed by: INTERNAL MEDICINE

## 2020-04-08 PROCEDURE — 82550 ASSAY OF CK (CPK): CPT

## 2020-04-08 PROCEDURE — 12000002 HC ACUTE/MED SURGE SEMI-PRIVATE ROOM

## 2020-04-08 PROCEDURE — 27000221 HC OXYGEN, UP TO 24 HOURS

## 2020-04-08 RX ORDER — ALBUTEROL SULFATE 90 UG/1
2 AEROSOL, METERED RESPIRATORY (INHALATION)
Status: DISCONTINUED | OUTPATIENT
Start: 2020-04-08 | End: 2020-04-10

## 2020-04-08 RX ADMIN — PANTOPRAZOLE SODIUM 40 MG: 40 TABLET, DELAYED RELEASE ORAL at 09:04

## 2020-04-08 RX ADMIN — ENOXAPARIN SODIUM 40 MG: 100 INJECTION SUBCUTANEOUS at 09:04

## 2020-04-08 RX ADMIN — ALBUTEROL SULFATE 2 PUFF: 90 AEROSOL, METERED RESPIRATORY (INHALATION) at 07:04

## 2020-04-08 RX ADMIN — INSULIN ASPART 2 UNITS: 100 INJECTION, SOLUTION INTRAVENOUS; SUBCUTANEOUS at 12:04

## 2020-04-08 RX ADMIN — ALBUTEROL SULFATE 2 PUFF: 90 AEROSOL, METERED RESPIRATORY (INHALATION) at 09:04

## 2020-04-08 RX ADMIN — INSULIN ASPART 2 UNITS: 100 INJECTION, SOLUTION INTRAVENOUS; SUBCUTANEOUS at 04:04

## 2020-04-08 RX ADMIN — ALBUTEROL SULFATE 2 PUFF: 90 AEROSOL, METERED RESPIRATORY (INHALATION) at 12:04

## 2020-04-08 RX ADMIN — OLANZAPINE 5 MG: 5 TABLET, FILM COATED ORAL at 09:04

## 2020-04-08 RX ADMIN — ASPIRIN 81 MG: 81 TABLET, DELAYED RELEASE ORAL at 09:04

## 2020-04-08 RX ADMIN — INSULIN ASPART 4 UNITS: 100 INJECTION, SOLUTION INTRAVENOUS; SUBCUTANEOUS at 09:04

## 2020-04-08 RX ADMIN — LEVOTHYROXINE SODIUM 75 MCG: 25 TABLET ORAL at 05:04

## 2020-04-08 RX ADMIN — ATORVASTATIN CALCIUM 20 MG: 20 TABLET, FILM COATED ORAL at 09:04

## 2020-04-08 RX ADMIN — PREGABALIN 150 MG: 75 CAPSULE ORAL at 09:04

## 2020-04-08 RX ADMIN — AZITHROMYCIN 250 MG: 250 TABLET, FILM COATED ORAL at 09:04

## 2020-04-08 RX ADMIN — HYDROXYCHLOROQUINE SULFATE 400 MG: 200 TABLET, FILM COATED ORAL at 09:04

## 2020-04-08 RX ADMIN — BENAZEPRIL HYDROCHLORIDE 20 MG: 20 TABLET, COATED ORAL at 09:04

## 2020-04-08 NOTE — ED PROVIDER NOTES
Encounter Date: 4/7/2020       History     Chief Complaint   Patient presents with    Cough     5 days, fever,diarrhea     Patient here with reported cough onset over last 5 days with associated fever shortness of breath patient began having diarrhea today cough is minimally productive he denies any abdominal pain denies any nausea vomiting no dysuria urgency or frequency does report a regular appetite he denies any antecedent illness he does report a mild headache        Review of patient's allergies indicates:   Allergen Reactions    Codeine Other (See Comments)     Other reaction(s): Rash  hallucinations    Penicillins Other (See Comments)     Other reaction(s): Shortness of breath  Other reaction(s): Itching  Unknown/as a child     Past Medical History:   Diagnosis Date    Colon polyp     Diabetes mellitus     Elevated PSA     GERD (gastroesophageal reflux disease)     Hyperlipidemia     Hypertension     Incontinence of urine     Neuropathy     Personal history of malignant neoplasm of large intestine     colon; prostate    Personal history of malignant neoplasm of prostate      Past Surgical History:   Procedure Laterality Date    APPENDECTOMY      COLON SURGERY  2005    resection    PROSTATE SURGERY  2006    prostatectomy    ROTATOR CUFF REPAIR      Rt shoulder    SHOULDER SURGERY      TONSILLECTOMY, ADENOIDECTOMY      as a baby     Family History   Problem Relation Age of Onset    Heart disease Father     Alcohol abuse Father      Social History     Tobacco Use    Smoking status: Never Smoker    Smokeless tobacco: Never Used   Substance Use Topics    Alcohol use: No    Drug use: Not on file     Review of Systems   Constitutional: Positive for chills, fatigue and fever. Negative for appetite change.   HENT: Positive for congestion.    Eyes: Negative.    Respiratory: Positive for cough and shortness of breath. Negative for chest tightness.    Cardiovascular: Negative.     Gastrointestinal: Positive for diarrhea. Negative for abdominal distention, nausea and vomiting.   Endocrine: Negative.    Genitourinary: Negative.    Musculoskeletal: Negative.    Skin: Negative.    Allergic/Immunologic: Negative.    Neurological: Negative.    Hematological: Negative.    Psychiatric/Behavioral: Negative.        Physical Exam     Initial Vitals [04/07/20 1637]   BP Pulse Resp Temp SpO2   111/70 (!) 114 20 98.3 °F (36.8 °C) (!) 92 %      MAP       --         Physical Exam    Constitutional: He appears well-developed and well-nourished. He appears distressed.   HENT:   Head: Normocephalic and atraumatic.   Right Ear: External ear normal.   Left Ear: External ear normal.   Mouth/Throat: Oropharynx is clear and moist.   Eyes: Conjunctivae and EOM are normal. Pupils are equal, round, and reactive to light.   Neck: Normal range of motion. Neck supple.   Cardiovascular: Regular rhythm, normal heart sounds and intact distal pulses.   Pulmonary/Chest: He is in respiratory distress. He has wheezes. He has rales.   Abdominal: Soft. Bowel sounds are normal. There is no tenderness.   Musculoskeletal: Normal range of motion. He exhibits no edema or tenderness.   Neurological: He is alert and oriented to person, place, and time. He has normal strength. GCS score is 15. GCS eye subscore is 4. GCS verbal subscore is 5. GCS motor subscore is 6.   Skin: Skin is warm and dry. Capillary refill takes less than 2 seconds. No erythema.   Psychiatric: He has a normal mood and affect. His behavior is normal.         ED Course   Procedures  Labs Reviewed   CBC W/ AUTO DIFFERENTIAL - Abnormal; Notable for the following components:       Result Value    RBC 3.52 (*)     Hemoglobin 10.8 (*)     Hematocrit 32.7 (*)     All other components within normal limits   COMPREHENSIVE METABOLIC PANEL - Abnormal; Notable for the following components:    Glucose 174 (*)     Alkaline Phosphatase 45 (*)     AST 62 (*)     ALT 54 (*)      eGFR if non  54.1 (*)     All other components within normal limits   C-REACTIVE PROTEIN - Abnormal; Notable for the following components:    CRP 3.54 (*)     All other components within normal limits   LACTATE DEHYDROGENASE - Abnormal; Notable for the following components:     (*)     All other components within normal limits   CK - Abnormal; Notable for the following components:     (*)     All other components within normal limits   LACTIC ACID, PLASMA - Abnormal; Notable for the following components:    Lactate (Lactic Acid) 2.0 (*)     All other components within normal limits    Narrative:      Lactic Acid critical result(s) repeated. Called and verbal readback   obtained from Bonnie Hamilton Rn/ER by AS2 04/07/2020 19:21   FERRITIN   TROPONIN I   PROCALCITONIN   SARS-COV-2 (COVID-19) QUALITATIVE PCR          Imaging Results          X-Ray Chest AP Portable (Final result)  Result time 04/07/20 18:21:51    Final result by Tolu John MD (04/07/20 18:21:51)                 Impression:      Bibasilar ground-glass opacities, right greater than left, compatible with viral pneumonitis in this patient with suspected COVID infection.      Electronically signed by: Tolu John MD  Date:    04/07/2020  Time:    18:21             Narrative:    EXAMINATION:  XR CHEST AP PORTABLE    CLINICAL HISTORY:  Suspected Covid-19 Virus Infection;    COMPARISON:  03/10/2020    FINDINGS:  Cardiomediastinal silhouette is stable.  Low lung volumes with left hemidiaphragm elevation.  Bibasilar ground-glass opacities, right greater than left.  No large pleural effusion.  No pneumothorax.  No acute osseous abnormality.  Left reverse shoulder arthroplasty.                                 Medical Decision Making:   ED Management:  Discussed patient's care with Dr. Masters to evaluate patient emergency department                                 Clinical Impression:       ICD-10-CM ICD-9-CM   1. Pneumonia of both  lower lobes due to infectious organism J18.1 483.8   2. Suspected Covid-19 Virus Infection R68.89              ED Disposition Condition    Admit                           Cipriano Ruth MD  04/07/20 1951

## 2020-04-08 NOTE — PROGRESS NOTES
[facility] Medicine  Progress Note    Patient Name: Bret Garcia  MRN: [id number]  Patient Class: IP- Inpatient   Admission Date: 4/7/2020  Length of Stay: 1 days  Attending Physician: Eliane Fields DO  Primary Care Provider: Pascual Avalos MD        Subjective:     Principal Problem:Pneumonia  Chief Complaint   Patient presents with    Cough     5 days, fever,diarrhea       Interval History: afvss, sating well on 3.5 L NC. BG elevated 200, CPK downtrending from 622--> 415, PCT wnl.   Patient seen and examined. Resting comfortably. Cough continues. Sob improved with supplemental o2. Tele monitoring ordered.     Review of Systems   Per interval history, all other systems reviewed and negative.     Objective:     Vital Signs (Most Recent):  Temp: 98.7 °F (37.1 °C) (04/07/20 2322)  Pulse: 91 (04/08/20 0735)  Resp: 18 (04/08/20 0735)  BP: 136/75 (04/07/20 2322)  SpO2: 100 % (04/08/20 0735) Vital Signs (24h Range):  Temp:  [98.3 °F (36.8 °C)-98.8 °F (37.1 °C)] 98.7 °F (37.1 °C)  Pulse:  [] 91  Resp:  [17-24] 18  SpO2:  [92 %-100 %] 100 %  BP: ()/(56-75) 136/75     Weight: 74.8 kg (164 lb 14.5 oz)  Body mass index is 29.21 kg/m².  No intake or output data in the 24 hours ending 04/08/20 0820     Physical Exam   Constitutional:  No distress.   HENT:   Head: Normocephalic and atraumatic.   Mouth/Throat: Oropharynx is clear and moist.   Eyes: Pupils are equal, round, and reactive to light.   Neck: Normal range of motion.   Cardiovascular: Regular rhythm.   Pulmonary/Chest: He has wheezes. 2Lnc  Abdominal: Soft. He exhibits no distension. There is no tenderness. There is no rebound and no guarding.    Genitourinary Comments: NO lawson   Musculoskeletal: Normal range of motion.    Skin: No rash noted.   Nursing note and vitals reviewed.      Significant Labs:   Blood Culture:   Recent Labs   Lab 04/07/20 2059 04/07/20 2107   LABBLOO No Growth to date No Growth to date     CBC:   Recent  Labs   Lab 04/07/20 1812   WBC 5.77   HGB 10.8*   HCT 32.7*        CMP:   Recent Labs   Lab 04/07/20 1812 04/08/20  0500    136   K 3.9 4.4   CL 97 98   CO2 26 29   * 200*   BUN 19 18   CREATININE 1.3 1.2   CALCIUM 9.1 8.9   PROT 6.7 6.5   ALBUMIN 3.6 3.4*   BILITOT 0.8 0.7   ALKPHOS 45* 47*   AST 62* 50*   ALT 54* 48*   ANIONGAP 13 9   EGFRNONAA 54.1* 59.6*     Troponin:   Recent Labs   Lab 04/07/20 1812   TROPONINI <0.030     Lab Results   Component Value Date    CRP 3.71 (H) 04/07/2020     Lab Results   Component Value Date    FERRITIN 255 04/07/2020       All pertinent labs within the past 24 hours have been reviewed.    Significant Imaging: I have reviewed all pertinent imaging results/findings within the past 24 hours.   Imaging Results          X-Ray Chest AP Portable (Final result)  Result time 04/07/20 18:21:51    Final result by Tolu John MD (04/07/20 18:21:51)                 Impression:      Bibasilar ground-glass opacities, right greater than left, compatible with viral pneumonitis in this patient with suspected COVID infection.      Electronically signed by: Tolu John MD  Date:    04/07/2020  Time:    18:21             Narrative:    EXAMINATION:  XR CHEST AP PORTABLE    CLINICAL HISTORY:  Suspected Covid-19 Virus Infection;    COMPARISON:  03/10/2020    FINDINGS:  Cardiomediastinal silhouette is stable.  Low lung volumes with left hemidiaphragm elevation.  Bibasilar ground-glass opacities, right greater than left.  No large pleural effusion.  No pneumothorax.  No acute osseous abnormality.  Left reverse shoulder arthroplasty.                                    Assessment/Plan:      * Pneumonia  Suspected Covid-19 Virus Infection  Acute hypoxemic respiratory failure  CXR with bilateral infiltrates.    Suspect Covid 19 infection.    Covid swab done in the ED on 4/7  Empiric treatment with azithromycin.   HCQ started 4/7   Ferritin level normal, will trend q  48hours  Mild elevation in lactic acid on admission  now wnl  Elevation in CRP 3.71  No Lymphopenia on admission  Covid swab done  .  trending  Oxygen support via NC to maintain O2 sats > 90%  Supportive care    Hypertension  Chronic medical condition.  Continuing home medication.  Monitoring.        Type 2 diabetes mellitus with complication  ISS.  Accuchecks.       Anemia, chronic disease  Chronic medical condition.  Continuing home medication.  Monitoring.      History of prostate cancer  Chronic medical condition.  Continuing home medication.  Monitoring.      VTE Risk Mitigation (From admission, onward)         Ordered     enoxaparin injection 40 mg  Daily      04/07/20 1958     IP VTE HIGH RISK PATIENT  Once      04/07/20 1958                Eliane Fields DO  Department of Hospital Medicine   [facility]

## 2020-04-08 NOTE — ASSESSMENT & PLAN NOTE
Has characteristic CXR findings  Ferritin level normal  Mild elevation in lactic acid- repeating  Elevation in CRP  No Lymphopenia  Covid swab done  .  Started plaquenil.  Oxygen support via NC

## 2020-04-08 NOTE — ED NOTES
Attempt made to call reports Gabbie reports receiving nurse Jenkins reports pt can be brought to floor, that he has looked at the pt's chart and no questions

## 2020-04-08 NOTE — SUBJECTIVE & OBJECTIVE
Interval History: afvss, sating well on 3.5 L NC. BG elevated 200, CPK downtrending from 622--> 415, PCT wnl.   Patient seen and examined    Review of Systems   Per interval history, all other systems reviewed and negative.     Objective:     Vital Signs (Most Recent):  Temp: 98.7 °F (37.1 °C) (04/07/20 2322)  Pulse: 91 (04/08/20 0735)  Resp: 18 (04/08/20 0735)  BP: 136/75 (04/07/20 2322)  SpO2: 100 % (04/08/20 0735) Vital Signs (24h Range):  Temp:  [98.3 °F (36.8 °C)-98.8 °F (37.1 °C)] 98.7 °F (37.1 °C)  Pulse:  [] 91  Resp:  [17-24] 18  SpO2:  [92 %-100 %] 100 %  BP: ()/(56-75) 136/75     Weight: 74.8 kg (164 lb 14.5 oz)  Body mass index is 29.21 kg/m².  No intake or output data in the 24 hours ending 04/08/20 0820   Physical Exam   Constitutional: He is oriented to person, place, and time. He appears well-developed. No distress.   Non toxic appearing but does appear not to feel well   HENT:   Head: Normocephalic and atraumatic.   Mouth/Throat: Oropharynx is clear and moist.   O2 per NC  Face mask in place   Eyes: Pupils are equal, round, and reactive to light. EOM are normal.   Neck: Normal range of motion.   Cardiovascular: Regular rhythm.   No murmur heard.  Pulmonary/Chest: He has wheezes.   Expiratory wheeze  Mild tachypnea at rest   Abdominal: Soft. He exhibits no distension. There is no tenderness. There is no rebound and no guarding.   Non distended   Genitourinary:   Genitourinary Comments: NO lawson   Musculoskeletal: Normal range of motion.   Neurological: He is alert and oriented to person, place, and time. No cranial nerve deficit or sensory deficit.   Skin: No rash noted.   Psychiatric: He has a normal mood and affect.   Nursing note and vitals reviewed.      Significant Labs:   Blood Culture:   Recent Labs   Lab 04/07/20 2059 04/07/20 2107   LABBLOO No Growth to date No Growth to date     CBC:   Recent Labs   Lab 04/07/20  1812   WBC 5.77   HGB 10.8*   HCT 32.7*        CMP:  Problem: Patient Care Overview (Infant)  Goal: Plan of Care Review  Outcome: Outcome(s) achieved Date Met:  10/08/17    10/08/17 0733   Outcome Evaluation   Outcome Summary/Follow up Plan VSS. FEEDING WELL. VOIDING & STOOLING.       Goal: Infant Individualization and Mutuality  Outcome: Outcome(s) achieved Date Met:  10/08/17  Goal: Discharge Needs Assessment  Outcome: Outcome(s) achieved Date Met:  10/08/17    Problem: [place] ([place],N[place])  Goal: Signs and Symptoms of Listed Potential Problems Will be Absent or Manageable ()  Outcome: Outcome(s) achieved Date Met:  10/08/17         Recent Labs   Lab 04/07/20 1812 04/08/20  0500    136   K 3.9 4.4   CL 97 98   CO2 26 29   * 200*   BUN 19 18   CREATININE 1.3 1.2   CALCIUM 9.1 8.9   PROT 6.7 6.5   ALBUMIN 3.6 3.4*   BILITOT 0.8 0.7   ALKPHOS 45* 47*   AST 62* 50*   ALT 54* 48*   ANIONGAP 13 9   EGFRNONAA 54.1* 59.6*     Troponin:   Recent Labs   Lab 04/07/20 1812   TROPONINI <0.030     Lab Results   Component Value Date    CRP 3.71 (H) 04/07/2020     Lab Results   Component Value Date    FERRITIN 255 04/07/2020       All pertinent labs within the past 24 hours have been reviewed.    Significant Imaging: I have reviewed all pertinent imaging results/findings within the past 24 hours.   Imaging Results          X-Ray Chest AP Portable (Final result)  Result time 04/07/20 18:21:51    Final result by Tolu John MD (04/07/20 18:21:51)                 Impression:      Bibasilar ground-glass opacities, right greater than left, compatible with viral pneumonitis in this patient with suspected COVID infection.      Electronically signed by: Tolu John MD  Date:    04/07/2020  Time:    18:21             Narrative:    EXAMINATION:  XR CHEST AP PORTABLE    CLINICAL HISTORY:  Suspected Covid-19 Virus Infection;    COMPARISON:  03/10/2020    FINDINGS:  Cardiomediastinal silhouette is stable.  Low lung volumes with left hemidiaphragm elevation.  Bibasilar ground-glass opacities, right greater than left.  No large pleural effusion.  No pneumothorax.  No acute osseous abnormality.  Left reverse shoulder arthroplasty.

## 2020-04-08 NOTE — SUBJECTIVE & OBJECTIVE
Past Medical History:   Diagnosis Date    Colon polyp     Diabetes mellitus     Elevated PSA     GERD (gastroesophageal reflux disease)     Hyperlipidemia     Hypertension     Incontinence of urine     Neuropathy     Personal history of malignant neoplasm of large intestine     colon; prostate    Personal history of malignant neoplasm of prostate        Past Surgical History:   Procedure Laterality Date    APPENDECTOMY      COLON SURGERY  2005    resection    PROSTATE SURGERY  2006    prostatectomy    ROTATOR CUFF REPAIR      Rt shoulder    SHOULDER SURGERY      TONSILLECTOMY, ADENOIDECTOMY      as a baby       Review of patient's allergies indicates:   Allergen Reactions    Codeine Other (See Comments)     Other reaction(s): Rash  hallucinations    Penicillins Other (See Comments)     Other reaction(s): Shortness of breath  Other reaction(s): Itching  Unknown/as a child       No current facility-administered medications on file prior to encounter.      Current Outpatient Medications on File Prior to Encounter   Medication Sig    aspirin (ECOTRIN) 81 MG EC tablet Take 81 mg by mouth once daily.    atorvastatin (LIPITOR) 20 MG tablet Take 20 mg by mouth every evening.     benazepril (LOTENSIN) 20 MG tablet Take 20 mg by mouth once daily.    BYDUREON 2 mg SSRR Inject 2 mGy as directed once a week.     levothyroxine (SYNTHROID) 75 MCG tablet Take 1 tablet by mouth before breakfast.     metformin (GLUCOPHAGE-XR) 500 MG 24 hr tablet Take 500 mg by mouth 2 (two) times daily with meals.     MYRBETRIQ 50 mg Tb24 TAKE 1 TABLET BY MOUTH EVERY DAY (Patient taking differently: Take 1 tablet by mouth once daily. )    olanzapine (ZYPREXA) 5 MG tablet Take 1 tablet by mouth.    omeprazole (PRILOSEC) 40 MG capsule Take 40 mg by mouth once daily.    pregabalin (LYRICA) 75 MG capsule Take 150 mg by mouth every evening.    exenatide (BYETTA) 5 mcg/dose (250 mcg/mL) 1.2 mL injection Byetta 5 mcg/dose (250  mcg/mL)1.2 mL subcutaneous pen injector    [DISCONTINUED] DULoxetine (CYMBALTA) 30 MG capsule Take 30 mg by mouth once daily.    [DISCONTINUED] duloxetine (CYMBALTA) 60 MG capsule Take 60 mg by mouth every evening.      [DISCONTINUED] pregabalin (LYRICA) 75 MG capsule Take 150 mg by mouth every evening.      Family History     Problem Relation (Age of Onset)    Alcohol abuse Father    Heart disease Father        Tobacco Use    Smoking status: Never Smoker    Smokeless tobacco: Never Used   Substance and Sexual Activity    Alcohol use: No    Drug use: Not on file    Sexual activity: Not on file     Review of Systems   Constitutional: Positive for fever.   HENT: Negative.    Eyes: Negative.    Respiratory: Positive for cough, shortness of breath and wheezing.    Cardiovascular: Negative.    Gastrointestinal: Positive for diarrhea.   Endocrine: Negative.    Genitourinary: Negative.    Musculoskeletal: Negative.    Skin: Negative.    Allergic/Immunologic: Negative.    Neurological: Positive for headaches.   Hematological: Negative.    Psychiatric/Behavioral: Negative.      Objective:     Vital Signs (Most Recent):  Temp: 98.8 °F (37.1 °C) (04/07/20 2009)  Pulse: 96 (04/07/20 2030)  Resp: 17 (04/07/20 2009)  BP: 94/61 (04/07/20 2030)  SpO2: 96 % (04/07/20 2030) Vital Signs (24h Range):  Temp:  [98.3 °F (36.8 °C)-98.8 °F (37.1 °C)] 98.8 °F (37.1 °C)  Pulse:  [] 96  Resp:  [17-24] 17  SpO2:  [92 %-98 %] 96 %  BP: ()/(56-70) 94/61     Weight: 77.1 kg (170 lb)  Body mass index is 30.11 kg/m².    Physical Exam   Constitutional: He is oriented to person, place, and time. He appears well-developed. No distress.   Non toxic appearing but does appear not to feel well   HENT:   Head: Normocephalic and atraumatic.   Mouth/Throat: Oropharynx is clear and moist.   O2 per NC  Face mask in place   Eyes: Pupils are equal, round, and reactive to light. EOM are normal.   Neck: Normal range of motion.   Cardiovascular:  Regular rhythm.   No murmur heard.  Pulmonary/Chest: He has wheezes.   Expiratory wheeze  Mild tachypnea at rest   Abdominal: Soft. He exhibits no distension. There is no tenderness. There is no rebound and no guarding.   Non distended   Genitourinary:   Genitourinary Comments: NO lawson   Musculoskeletal: Normal range of motion.   Neurological: He is alert and oriented to person, place, and time. No cranial nerve deficit or sensory deficit.   Skin: No rash noted.   Psychiatric: He has a normal mood and affect.   Nursing note and vitals reviewed.        CRANIAL NERVES     CN III, IV, VI   Pupils are equal, round, and reactive to light.  Extraocular motions are normal.        Significant Labs:   CBC:   Recent Labs   Lab 04/07/20  1812   WBC 5.77   HGB 10.8*   HCT 32.7*        CMP:   Recent Labs   Lab 04/07/20  1812      K 3.9   CL 97   CO2 26   *   BUN 19   CREATININE 1.3   CALCIUM 9.1   PROT 6.7   ALBUMIN 3.6   BILITOT 0.8   ALKPHOS 45*   AST 62*   ALT 54*   ANIONGAP 13   EGFRNONAA 54.1*     Cardiac Markers: No results for input(s): CKMB, MYOGLOBIN, BNP, TROPISTAT in the last 48 hours.  Lactic Acid:   Recent Labs   Lab 04/07/20  1812   LACTATE 2.0*     Troponin:   Recent Labs   Lab 04/07/20 1812   TROPONINI <0.030       Significant Imaging: CXR: I have reviewed all pertinent results/findings within the past 24 hours and my personal findings are:  bibasilar ground glass opacities right greater than left

## 2020-04-08 NOTE — H&P
[facility] Medicine  History & Physical    Patient Name: Bret Garcia  MRN: [id number]  Admission Date: 4/7/2020  Attending Physician: Shayla Masters MD  Primary Care Provider: Pascual Avalos MD         Patient information was obtained from patient and ER records.     Subjective:     Principal Problem:Pneumonia    Chief Complaint:   Chief Complaint   Patient presents with    Cough     5 days, fever,diarrhea        HPI: 74 year old male with DMII, HTN presents with cough, SOB and fevers intermittently at home for the past week.  He reports cough is non productive, persistent, moderately severe, associated with fever as high as 101 which was 3 days ago.  On presentation to the ED, he was noted to be hypoxic on room air down to upper 80s which did improve with with O2 per NC. He is a non smoker.  Denies known exposure to covid 19.  CXR revealing of bibasilar ground glass opacities right greater than left.     Past Medical History:   Diagnosis Date    Colon polyp     Diabetes mellitus     Elevated PSA     GERD (gastroesophageal reflux disease)     Hyperlipidemia     Hypertension     Incontinence of urine     Neuropathy     Personal history of malignant neoplasm of large intestine     colon; prostate    Personal history of malignant neoplasm of prostate        Past Surgical History:   Procedure Laterality Date    APPENDECTOMY      COLON SURGERY  2005    resection    PROSTATE SURGERY  2006    prostatectomy    ROTATOR CUFF REPAIR      Rt shoulder    SHOULDER SURGERY      TONSILLECTOMY, ADENOIDECTOMY      as a baby       Review of patient's allergies indicates:   Allergen Reactions    Codeine Other (See Comments)     Other reaction(s): Rash  hallucinations    Penicillins Other (See Comments)     Other reaction(s): Shortness of breath  Other reaction(s): Itching  Unknown/as a child       No current facility-administered medications on file prior to encounter.      Current  Outpatient Medications on File Prior to Encounter   Medication Sig    aspirin (ECOTRIN) 81 MG EC tablet Take 81 mg by mouth once daily.    atorvastatin (LIPITOR) 20 MG tablet Take 20 mg by mouth every evening.     benazepril (LOTENSIN) 20 MG tablet Take 20 mg by mouth once daily.    BYDUREON 2 mg SSRR Inject 2 mGy as directed once a week.     levothyroxine (SYNTHROID) 75 MCG tablet Take 1 tablet by mouth before breakfast.     metformin (GLUCOPHAGE-XR) 500 MG 24 hr tablet Take 500 mg by mouth 2 (two) times daily with meals.     MYRBETRIQ 50 mg Tb24 TAKE 1 TABLET BY MOUTH EVERY DAY (Patient taking differently: Take 1 tablet by mouth once daily. )    olanzapine (ZYPREXA) 5 MG tablet Take 1 tablet by mouth.    omeprazole (PRILOSEC) 40 MG capsule Take 40 mg by mouth once daily.    pregabalin (LYRICA) 75 MG capsule Take 150 mg by mouth every evening.    exenatide (BYETTA) 5 mcg/dose (250 mcg/mL) 1.2 mL injection Byetta 5 mcg/dose (250 mcg/mL)1.2 mL subcutaneous pen injector    [DISCONTINUED] DULoxetine (CYMBALTA) 30 MG capsule Take 30 mg by mouth once daily.    [DISCONTINUED] duloxetine (CYMBALTA) 60 MG capsule Take 60 mg by mouth every evening.      [DISCONTINUED] pregabalin (LYRICA) 75 MG capsule Take 150 mg by mouth every evening.      Family History     Problem Relation (Age of Onset)    Alcohol abuse Father    Heart disease Father        Tobacco Use    Smoking status: Never Smoker    Smokeless tobacco: Never Used   Substance and Sexual Activity    Alcohol use: No    Drug use: Not on file    Sexual activity: Not on file     Review of Systems   Constitutional: Positive for fever.   HENT: Negative.    Eyes: Negative.    Respiratory: Positive for cough, shortness of breath and wheezing.    Cardiovascular: Negative.    Gastrointestinal: Positive for diarrhea.   Endocrine: Negative.    Genitourinary: Negative.    Musculoskeletal: Negative.    Skin: Negative.    Allergic/Immunologic: Negative.     Neurological: Positive for headaches.   Hematological: Negative.    Psychiatric/Behavioral: Negative.      Objective:     Vital Signs (Most Recent):  Temp: 98.8 °F (37.1 °C) (04/07/20 2009)  Pulse: 96 (04/07/20 2030)  Resp: 17 (04/07/20 2009)  BP: 94/61 (04/07/20 2030)  SpO2: 96 % (04/07/20 2030) Vital Signs (24h Range):  Temp:  [98.3 °F (36.8 °C)-98.8 °F (37.1 °C)] 98.8 °F (37.1 °C)  Pulse:  [] 96  Resp:  [17-24] 17  SpO2:  [92 %-98 %] 96 %  BP: ()/(56-70) 94/61     Weight: 77.1 kg (170 lb)  Body mass index is 30.11 kg/m².    Physical Exam   Constitutional: He is oriented to person, place, and time. He appears well-developed. No distress.   Non toxic appearing but does appear not to feel well   HENT:   Head: Normocephalic and atraumatic.   Mouth/Throat: Oropharynx is clear and moist.   O2 per NC  Face mask in place   Eyes: Pupils are equal, round, and reactive to light. EOM are normal.   Neck: Normal range of motion.   Cardiovascular: Regular rhythm.   No murmur heard.  Pulmonary/Chest: He has wheezes.   Expiratory wheeze  Mild tachypnea at rest   Abdominal: Soft. He exhibits no distension. There is no tenderness. There is no rebound and no guarding.   Non distended   Genitourinary:   Genitourinary Comments: NO lawson   Musculoskeletal: Normal range of motion.   Neurological: He is alert and oriented to person, place, and time. No cranial nerve deficit or sensory deficit.   Skin: No rash noted.   Psychiatric: He has a normal mood and affect.   Nursing note and vitals reviewed.        CRANIAL NERVES     CN III, IV, VI   Pupils are equal, round, and reactive to light.  Extraocular motions are normal.        Significant Labs:   CBC:   Recent Labs   Lab 04/07/20 1812   WBC 5.77   HGB 10.8*   HCT 32.7*        CMP:   Recent Labs   Lab 04/07/20 1812      K 3.9   CL 97   CO2 26   *   BUN 19   CREATININE 1.3   CALCIUM 9.1   PROT 6.7   ALBUMIN 3.6   BILITOT 0.8   ALKPHOS 45*   AST 62*   ALT  54*   ANIONGAP 13   EGFRNONAA 54.1*     Cardiac Markers: No results for input(s): CKMB, MYOGLOBIN, BNP, TROPISTAT in the last 48 hours.  Lactic Acid:   Recent Labs   Lab 04/07/20 1812   LACTATE 2.0*     Troponin:   Recent Labs   Lab 04/07/20 1812   TROPONINI <0.030       Significant Imaging: CXR: I have reviewed all pertinent results/findings within the past 24 hours and my personal findings are:  bibasilar ground glass opacities right greater than left    Assessment/Plan:     * Pneumonia  CXR with bilateral infiltrates.  Suspect Covid 19 infection.  Covid swab done in the ED.  Empiric treatment with azithromycin.  I did not start rocephin given PCN allergy.  Pro calcitonin ordered. Cultures in progress.  O2 per NC        Acute hypoxemic respiratory failure  See pneumonia       Suspected Covid-19 Virus Infection  Has characteristic CXR findings  Ferritin level normal  Mild elevation in lactic acid- repeating  Elevation in CRP  No Lymphopenia  Covid swab done  .  Started plaquenil.  Oxygen support via NC      Hypertension  Chronic medical condition.  Continuing home medication.  Monitoring.        Type 2 diabetes mellitus with complication  ISS.  Accuchecks.       Anemia, chronic disease  Chronic medical condition.  Continuing home medication.  Monitoring.        History of prostate cancer  Chronic medical condition.  Continuing home medication.  Monitoring.          VTE Risk Mitigation (From admission, onward)         Ordered     enoxaparin injection 40 mg  Daily      04/07/20 1958     IP VTE HIGH RISK PATIENT  Once      04/07/20 1958                   Shayla Masters MD  Department of Hospital Medicine   [facility]

## 2020-04-08 NOTE — HPI
74 year old male with DMII, HTN presents with cough, SOB and fevers intermittently at home for the past week.  He reports cough is non productive, persistent, moderately severe, associated with fever as high as 101 which was 3 days ago.  On presentation to the ED, he was noted to be hypoxic on room air down to upper 80s which did improve with with O2 per NC. He is a non smoker.  Denies known exposure to covid 19.  CXR revealing of bibasilar ground glass opacities right greater than left.

## 2020-04-08 NOTE — ASSESSMENT & PLAN NOTE
CXR with bilateral infiltrates.  Suspect Covid 19 infection.  Covid swab done in the ED.  Empiric treatment with azithromycin.  I did not start rocephin given PCN allergy.  Pro calcitonin ordered. Cultures in progress.  O2 per NC

## 2020-04-09 LAB
ALBUMIN SERPL BCP-MCNC: 3.3 G/DL (ref 3.5–5.2)
ALP SERPL-CCNC: 46 U/L (ref 55–135)
ALT SERPL W/O P-5'-P-CCNC: 46 U/L (ref 10–44)
ANION GAP SERPL CALC-SCNC: 13 MMOL/L (ref 8–16)
AST SERPL-CCNC: 41 U/L (ref 10–40)
BASOPHILS # BLD AUTO: 0.04 K/UL (ref 0–0.2)
BASOPHILS NFR BLD: 0.7 % (ref 0–1.9)
BILIRUB SERPL-MCNC: 0.5 MG/DL (ref 0.1–1)
BNP SERPL-MCNC: 18 PG/ML (ref 0–99)
BUN SERPL-MCNC: 17 MG/DL (ref 8–23)
CALCIUM SERPL-MCNC: 9.3 MG/DL (ref 8.7–10.5)
CHLORIDE SERPL-SCNC: 97 MMOL/L (ref 95–110)
CO2 SERPL-SCNC: 27 MMOL/L (ref 23–29)
CREAT SERPL-MCNC: 1.1 MG/DL (ref 0.5–1.4)
CRP SERPL-MCNC: 1.66 MG/DL (ref 0–0.75)
DIFFERENTIAL METHOD: ABNORMAL
EOSINOPHIL # BLD AUTO: 0.3 K/UL (ref 0–0.5)
EOSINOPHIL NFR BLD: 5.6 % (ref 0–8)
ERYTHROCYTE [DISTWIDTH] IN BLOOD BY AUTOMATED COUNT: 12.3 % (ref 11.5–14.5)
EST. GFR  (AFRICAN AMERICAN): >60 ML/MIN/1.73 M^2
EST. GFR  (NON AFRICAN AMERICAN): >60 ML/MIN/1.73 M^2
FERRITIN SERPL-MCNC: 179 NG/ML (ref 20–300)
GLUCOSE SERPL-MCNC: 132 MG/DL (ref 70–110)
GLUCOSE SERPL-MCNC: 134 MG/DL (ref 70–110)
GLUCOSE SERPL-MCNC: 164 MG/DL (ref 70–110)
GLUCOSE SERPL-MCNC: 169 MG/DL (ref 70–110)
GLUCOSE SERPL-MCNC: 211 MG/DL (ref 70–110)
HCT VFR BLD AUTO: 33.3 % (ref 40–54)
HGB BLD-MCNC: 10.7 G/DL (ref 14–18)
IMM GRANULOCYTES # BLD AUTO: 0.05 K/UL (ref 0–0.04)
IMM GRANULOCYTES NFR BLD AUTO: 0.9 % (ref 0–0.5)
LYMPHOCYTES # BLD AUTO: 1.7 K/UL (ref 1–4.8)
LYMPHOCYTES NFR BLD: 30.4 % (ref 18–48)
MAGNESIUM SERPL-MCNC: 1.6 MG/DL (ref 1.6–2.6)
MCH RBC QN AUTO: 30.6 PG (ref 27–31)
MCHC RBC AUTO-ENTMCNC: 32.1 G/DL (ref 32–36)
MCV RBC AUTO: 95 FL (ref 82–98)
MONOCYTES # BLD AUTO: 0.4 K/UL (ref 0.3–1)
MONOCYTES NFR BLD: 6.6 % (ref 4–15)
NEUTROPHILS # BLD AUTO: 3.2 K/UL (ref 1.8–7.7)
NEUTROPHILS NFR BLD: 55.8 % (ref 38–73)
NRBC BLD-RTO: 0 /100 WBC
PLATELET # BLD AUTO: 277 K/UL (ref 150–350)
PMV BLD AUTO: 9.4 FL (ref 9.2–12.9)
POTASSIUM SERPL-SCNC: 4.2 MMOL/L (ref 3.5–5.1)
PROT SERPL-MCNC: 6.5 G/DL (ref 6–8.4)
RBC # BLD AUTO: 3.5 M/UL (ref 4.6–6.2)
SARS-COV-2 RNA RESP QL NAA+PROBE: NOT DETECTED
SODIUM SERPL-SCNC: 137 MMOL/L (ref 136–145)
WBC # BLD AUTO: 5.72 K/UL (ref 3.9–12.7)

## 2020-04-09 PROCEDURE — 94760 N-INVAS EAR/PLS OXIMETRY 1: CPT

## 2020-04-09 PROCEDURE — 83735 ASSAY OF MAGNESIUM: CPT

## 2020-04-09 PROCEDURE — 99900035 HC TECH TIME PER 15 MIN (STAT)

## 2020-04-09 PROCEDURE — 94799 UNLISTED PULMONARY SVC/PX: CPT

## 2020-04-09 PROCEDURE — 25000003 PHARM REV CODE 250: Performed by: INTERNAL MEDICINE

## 2020-04-09 PROCEDURE — 36415 COLL VENOUS BLD VENIPUNCTURE: CPT

## 2020-04-09 PROCEDURE — 63600175 PHARM REV CODE 636 W HCPCS: Performed by: STUDENT IN AN ORGANIZED HEALTH CARE EDUCATION/TRAINING PROGRAM

## 2020-04-09 PROCEDURE — 12000002 HC ACUTE/MED SURGE SEMI-PRIVATE ROOM

## 2020-04-09 PROCEDURE — 25000003 PHARM REV CODE 250: Performed by: STUDENT IN AN ORGANIZED HEALTH CARE EDUCATION/TRAINING PROGRAM

## 2020-04-09 PROCEDURE — 94640 AIRWAY INHALATION TREATMENT: CPT

## 2020-04-09 PROCEDURE — 82728 ASSAY OF FERRITIN: CPT

## 2020-04-09 PROCEDURE — 80053 COMPREHEN METABOLIC PANEL: CPT

## 2020-04-09 PROCEDURE — 85025 COMPLETE CBC W/AUTO DIFF WBC: CPT

## 2020-04-09 PROCEDURE — 86140 C-REACTIVE PROTEIN: CPT

## 2020-04-09 PROCEDURE — 94761 N-INVAS EAR/PLS OXIMETRY MLT: CPT

## 2020-04-09 PROCEDURE — 63600175 PHARM REV CODE 636 W HCPCS: Performed by: INTERNAL MEDICINE

## 2020-04-09 PROCEDURE — 83880 ASSAY OF NATRIURETIC PEPTIDE: CPT

## 2020-04-09 RX ORDER — DOCUSATE SODIUM 100 MG/1
200 CAPSULE, LIQUID FILLED ORAL 2 TIMES DAILY
Status: DISCONTINUED | OUTPATIENT
Start: 2020-04-09 | End: 2020-04-11 | Stop reason: HOSPADM

## 2020-04-09 RX ORDER — LEVOFLOXACIN 5 MG/ML
500 INJECTION, SOLUTION INTRAVENOUS
Status: DISCONTINUED | OUTPATIENT
Start: 2020-04-09 | End: 2020-04-11 | Stop reason: HOSPADM

## 2020-04-09 RX ORDER — PREDNISONE 20 MG/1
40 TABLET ORAL DAILY
Status: DISCONTINUED | OUTPATIENT
Start: 2020-04-09 | End: 2020-04-11 | Stop reason: HOSPADM

## 2020-04-09 RX ORDER — OXYBUTYNIN CHLORIDE 10 MG/1
10 TABLET, EXTENDED RELEASE ORAL DAILY
Status: DISCONTINUED | OUTPATIENT
Start: 2020-04-09 | End: 2020-04-11 | Stop reason: HOSPADM

## 2020-04-09 RX ADMIN — DOCUSATE SODIUM 200 MG: 100 CAPSULE, LIQUID FILLED ORAL at 09:04

## 2020-04-09 RX ADMIN — ENOXAPARIN SODIUM 40 MG: 100 INJECTION SUBCUTANEOUS at 09:04

## 2020-04-09 RX ADMIN — PREDNISONE 40 MG: 20 TABLET ORAL at 03:04

## 2020-04-09 RX ADMIN — LEVOFLOXACIN 500 MG: 5 INJECTION, SOLUTION INTRAVENOUS at 08:04

## 2020-04-09 RX ADMIN — OLANZAPINE 5 MG: 5 TABLET, FILM COATED ORAL at 08:04

## 2020-04-09 RX ADMIN — BENAZEPRIL HYDROCHLORIDE 20 MG: 20 TABLET, COATED ORAL at 08:04

## 2020-04-09 RX ADMIN — ALBUTEROL SULFATE 2 PUFF: 90 AEROSOL, METERED RESPIRATORY (INHALATION) at 02:04

## 2020-04-09 RX ADMIN — LEVOTHYROXINE SODIUM 75 MCG: 25 TABLET ORAL at 05:04

## 2020-04-09 RX ADMIN — MELATONIN 6 MG: at 09:04

## 2020-04-09 RX ADMIN — PANTOPRAZOLE SODIUM 40 MG: 40 TABLET, DELAYED RELEASE ORAL at 08:04

## 2020-04-09 RX ADMIN — ALBUTEROL SULFATE 2 PUFF: 90 AEROSOL, METERED RESPIRATORY (INHALATION) at 08:04

## 2020-04-09 RX ADMIN — ALBUTEROL SULFATE 2 PUFF: 90 AEROSOL, METERED RESPIRATORY (INHALATION) at 07:04

## 2020-04-09 RX ADMIN — ATORVASTATIN CALCIUM 20 MG: 20 TABLET, FILM COATED ORAL at 09:04

## 2020-04-09 RX ADMIN — OXYBUTYNIN CHLORIDE 10 MG: 10 TABLET, EXTENDED RELEASE ORAL at 02:04

## 2020-04-09 RX ADMIN — ASPIRIN 81 MG: 81 TABLET, DELAYED RELEASE ORAL at 08:04

## 2020-04-09 RX ADMIN — INSULIN ASPART 2 UNITS: 100 INJECTION, SOLUTION INTRAVENOUS; SUBCUTANEOUS at 05:04

## 2020-04-09 RX ADMIN — PREGABALIN 150 MG: 75 CAPSULE ORAL at 09:04

## 2020-04-09 NOTE — PROGRESS NOTES
[facility]  Progress Note    Patient Name: Bret Garcia  MRN: [id number]  Patient Class: IP- Inpatient   Admission Date: 4/7/2020  Length of Stay: 2 days  Attending Physician: Eliane Fields DO  Primary Care Provider: Pascual Avalos MD        Subjective:     Principal Problem:Pneumonia  Chief Complaint   Patient presents with    Cough     5 days, fever,diarrhea         Interval History: covid testing negative. Afvss, better o2 sats today and on room air during exam. He states that he is tired/faigued. Denies any CP, SOB, N/V/D, headaches, fever or chills.       Review of Systems   As above  Objective:     Vital Signs (Most Recent):  Temp: 98.3 °F (36.8 °C) (04/09/20 0701)  Pulse: 82 (04/09/20 1400)  Resp: 14 (04/09/20 1400)  BP: 117/79 (04/09/20 0701)  SpO2: 97 % (04/09/20 1400) Vital Signs (24h Range):  Temp:  [97.7 °F (36.5 °C)-98.3 °F (36.8 °C)] 98.3 °F (36.8 °C)  Pulse:  [] 82  Resp:  [14-20] 14  SpO2:  [88 %-100 %] 97 %  BP: ()/(62-79) 117/79     Weight: 74.8 kg (164 lb 14.5 oz)  Body mass index is 29.21 kg/m².    Intake/Output Summary (Last 24 hours) at 4/9/2020 1433  Last data filed at 4/9/2020 1427  Gross per 24 hour   Intake 600 ml   Output --   Net 600 ml      Physical Exam   Constitutional: He is oriented to person, place, and time. He appears well-developed. No distress.   HENT:   Head: Normocephalic and atraumatic.   Mouth/Throat: Oropharynx is clear and moist.   O2 per NC  Face mask in place   Eyes: Pupils are equal, round, and reactive to light. EOM are normal.   Neck: Normal range of motion. Neck supple.   Cardiovascular: Regular rhythm.   No murmur heard.  Pulmonary/Chest: Effort normal. He has no wheezes.   Abdominal: Soft. He exhibits no distension. There is no tenderness. There is no rebound and no guarding.   Genitourinary:   Genitourinary Comments: NO lawson   Musculoskeletal: Normal range of motion.   Neurological: He is alert and oriented to  person, place, and time. No cranial nerve deficit or sensory deficit.   Skin: No rash noted.   Psychiatric: He has a normal mood and affect.   Nursing note and vitals reviewed.      Significant Labs:   CBC:   Recent Labs   Lab 04/07/20 1812 04/09/20 0519   WBC 5.77 5.72   HGB 10.8* 10.7*   HCT 32.7* 33.3*    277     CMP:   Recent Labs   Lab 04/07/20 1812 04/08/20  0500 04/09/20 0519    136 137   K 3.9 4.4 4.2   CL 97 98 97   CO2 26 29 27   * 200* 169*   BUN 19 18 17   CREATININE 1.3 1.2 1.1   CALCIUM 9.1 8.9 9.3   PROT 6.7 6.5 6.5   ALBUMIN 3.6 3.4* 3.3*   BILITOT 0.8 0.7 0.5   ALKPHOS 45* 47* 46*   AST 62* 50* 41*   ALT 54* 48* 46*   ANIONGAP 13 9 13   EGFRNONAA 54.1* 59.6* >60.0     All pertinent labs within the past 24 hours have been reviewed.    Significant Imaging: I have reviewed all pertinent imaging results/findings within the past 24 hours.   Imaging Results          X-Ray Chest AP Portable (Final result)  Result time 04/07/20 18:21:51    Final result by Tolu John MD (04/07/20 18:21:51)                 Impression:      Bibasilar ground-glass opacities, right greater than left, compatible with viral pneumonitis in this patient with suspected COVID infection.      Electronically signed by: Tolu John MD  Date:    04/07/2020  Time:    18:21             Narrative:    EXAMINATION:  XR CHEST AP PORTABLE    CLINICAL HISTORY:  Suspected Covid-19 Virus Infection;    COMPARISON:  03/10/2020    FINDINGS:  Cardiomediastinal silhouette is stable.  Low lung volumes with left hemidiaphragm elevation.  Bibasilar ground-glass opacities, right greater than left.  No large pleural effusion.  No pneumothorax.  No acute osseous abnormality.  Left reverse shoulder arthroplasty.                                    Assessment/Plan:      * Pneumonia  Suspected Covid-19 Virus Infection- negative  Acute hypoxemic respiratory failure  CXR with bilateral infiltrates.    Covid swab negative  Ferritin  level normal, No Lymphopenia on admission  Mild elevation in lactic acid on admission  now wnl  Elevation in CRP 3.71--> 1.66  Oxygen support via NC to maintain O2 sats > 90%  Supportive care  IV levofloxacin  Inhalers, steriods   Pt/ot     Hypertension  Chronic medical condition.  Continuing home medication.  Monitoring.        Type 2 diabetes mellitus with complication  ISS.  Accuchecks.         Anemia, chronic disease  Chronic medical condition.  Continuing home medication.  Monitoring.        History of prostate cancer  Chronic medical condition.  Continuing home medication.  Monitoring.        VTE Risk Mitigation (From admission, onward)         Ordered     enoxaparin injection 40 mg  Daily      04/07/20 1958     IP VTE HIGH RISK PATIENT  Once      04/07/20 1958                  Eliane Fields DO  Department of Hospital Medicine   [facility]

## 2020-04-09 NOTE — SUBJECTIVE & OBJECTIVE
Interval History: covid testing negative. Afvss, better o2 sats today and on room air during exam. He states that he is tired/faigued. Denies any CP, SOB, N/V/D, headaches, fever or chills.       Review of Systems   As above  Objective:     Vital Signs (Most Recent):  Temp: 98.3 °F (36.8 °C) (04/09/20 0701)  Pulse: 82 (04/09/20 1400)  Resp: 14 (04/09/20 1400)  BP: 117/79 (04/09/20 0701)  SpO2: 97 % (04/09/20 1400) Vital Signs (24h Range):  Temp:  [97.7 °F (36.5 °C)-98.3 °F (36.8 °C)] 98.3 °F (36.8 °C)  Pulse:  [] 82  Resp:  [14-20] 14  SpO2:  [88 %-100 %] 97 %  BP: ()/(62-79) 117/79     Weight: 74.8 kg (164 lb 14.5 oz)  Body mass index is 29.21 kg/m².    Intake/Output Summary (Last 24 hours) at 4/9/2020 1433  Last data filed at 4/9/2020 1427  Gross per 24 hour   Intake 600 ml   Output --   Net 600 ml      Physical Exam   Constitutional: He is oriented to person, place, and time. He appears well-developed. No distress.   HENT:   Head: Normocephalic and atraumatic.   Mouth/Throat: Oropharynx is clear and moist.   O2 per NC  Face mask in place   Eyes: Pupils are equal, round, and reactive to light. EOM are normal.   Neck: Normal range of motion. Neck supple.   Cardiovascular: Regular rhythm.   No murmur heard.  Pulmonary/Chest: Effort normal. He has no wheezes.   Abdominal: Soft. He exhibits no distension. There is no tenderness. There is no rebound and no guarding.   Genitourinary:   Genitourinary Comments: NO lawson   Musculoskeletal: Normal range of motion.   Neurological: He is alert and oriented to person, place, and time. No cranial nerve deficit or sensory deficit.   Skin: No rash noted.   Psychiatric: He has a normal mood and affect.   Nursing note and vitals reviewed.      Significant Labs:   CBC:   Recent Labs   Lab 04/07/20 1812 04/09/20  0519   WBC 5.77 5.72   HGB 10.8* 10.7*   HCT 32.7* 33.3*    277     CMP:   Recent Labs   Lab 04/07/20 1812 04/08/20  0500 04/09/20  0519    136  137   K 3.9 4.4 4.2   CL 97 98 97   CO2 26 29 27   * 200* 169*   BUN 19 18 17   CREATININE 1.3 1.2 1.1   CALCIUM 9.1 8.9 9.3   PROT 6.7 6.5 6.5   ALBUMIN 3.6 3.4* 3.3*   BILITOT 0.8 0.7 0.5   ALKPHOS 45* 47* 46*   AST 62* 50* 41*   ALT 54* 48* 46*   ANIONGAP 13 9 13   EGFRNONAA 54.1* 59.6* >60.0     All pertinent labs within the past 24 hours have been reviewed.    Significant Imaging: I have reviewed all pertinent imaging results/findings within the past 24 hours.   Imaging Results          X-Ray Chest AP Portable (Final result)  Result time 04/07/20 18:21:51    Final result by Tolu John MD (04/07/20 18:21:51)                 Impression:      Bibasilar ground-glass opacities, right greater than left, compatible with viral pneumonitis in this patient with suspected COVID infection.      Electronically signed by: Tolu John MD  Date:    04/07/2020  Time:    18:21             Narrative:    EXAMINATION:  XR CHEST AP PORTABLE    CLINICAL HISTORY:  Suspected Covid-19 Virus Infection;    COMPARISON:  03/10/2020    FINDINGS:  Cardiomediastinal silhouette is stable.  Low lung volumes with left hemidiaphragm elevation.  Bibasilar ground-glass opacities, right greater than left.  No large pleural effusion.  No pneumothorax.  No acute osseous abnormality.  Left reverse shoulder arthroplasty.

## 2020-04-10 LAB
ALBUMIN SERPL BCP-MCNC: 3.4 G/DL (ref 3.5–5.2)
ALP SERPL-CCNC: 45 U/L (ref 55–135)
ALT SERPL W/O P-5'-P-CCNC: 39 U/L (ref 10–44)
ANION GAP SERPL CALC-SCNC: 10 MMOL/L (ref 8–16)
AST SERPL-CCNC: 30 U/L (ref 10–40)
BASOPHILS # BLD AUTO: 0.01 K/UL (ref 0–0.2)
BASOPHILS NFR BLD: 0.1 % (ref 0–1.9)
BILIRUB SERPL-MCNC: 0.8 MG/DL (ref 0.1–1)
BUN SERPL-MCNC: 24 MG/DL (ref 8–23)
CALCIUM SERPL-MCNC: 9.2 MG/DL (ref 8.7–10.5)
CHLORIDE SERPL-SCNC: 96 MMOL/L (ref 95–110)
CO2 SERPL-SCNC: 26 MMOL/L (ref 23–29)
CREAT SERPL-MCNC: 1.1 MG/DL (ref 0.5–1.4)
DIFFERENTIAL METHOD: ABNORMAL
EOSINOPHIL # BLD AUTO: 0 K/UL (ref 0–0.5)
EOSINOPHIL NFR BLD: 0 % (ref 0–8)
ERYTHROCYTE [DISTWIDTH] IN BLOOD BY AUTOMATED COUNT: 11.9 % (ref 11.5–14.5)
EST. GFR  (AFRICAN AMERICAN): >60 ML/MIN/1.73 M^2
EST. GFR  (NON AFRICAN AMERICAN): >60 ML/MIN/1.73 M^2
GLUCOSE SERPL-MCNC: 230 MG/DL (ref 70–110)
GLUCOSE SERPL-MCNC: 258 MG/DL (ref 70–110)
GLUCOSE SERPL-MCNC: 405 MG/DL (ref 70–110)
HCT VFR BLD AUTO: 32.2 % (ref 40–54)
HGB BLD-MCNC: 10.6 G/DL (ref 14–18)
IMM GRANULOCYTES # BLD AUTO: 0.09 K/UL (ref 0–0.04)
IMM GRANULOCYTES NFR BLD AUTO: 1.2 % (ref 0–0.5)
LYMPHOCYTES # BLD AUTO: 0.9 K/UL (ref 1–4.8)
LYMPHOCYTES NFR BLD: 11.9 % (ref 18–48)
MAGNESIUM SERPL-MCNC: 1.5 MG/DL (ref 1.6–2.6)
MCH RBC QN AUTO: 30.1 PG (ref 27–31)
MCHC RBC AUTO-ENTMCNC: 32.9 G/DL (ref 32–36)
MCV RBC AUTO: 92 FL (ref 82–98)
MONOCYTES # BLD AUTO: 0.1 K/UL (ref 0.3–1)
MONOCYTES NFR BLD: 1.6 % (ref 4–15)
NEUTROPHILS # BLD AUTO: 6.4 K/UL (ref 1.8–7.7)
NEUTROPHILS NFR BLD: 85.2 % (ref 38–73)
NRBC BLD-RTO: 0 /100 WBC
PLATELET # BLD AUTO: 329 K/UL (ref 150–350)
PMV BLD AUTO: 9.3 FL (ref 9.2–12.9)
POTASSIUM SERPL-SCNC: 4.9 MMOL/L (ref 3.5–5.1)
PROT SERPL-MCNC: 6.6 G/DL (ref 6–8.4)
RBC # BLD AUTO: 3.52 M/UL (ref 4.6–6.2)
SODIUM SERPL-SCNC: 132 MMOL/L (ref 136–145)
WBC # BLD AUTO: 7.47 K/UL (ref 3.9–12.7)

## 2020-04-10 PROCEDURE — 99900035 HC TECH TIME PER 15 MIN (STAT)

## 2020-04-10 PROCEDURE — 63600175 PHARM REV CODE 636 W HCPCS: Performed by: INTERNAL MEDICINE

## 2020-04-10 PROCEDURE — 94640 AIRWAY INHALATION TREATMENT: CPT

## 2020-04-10 PROCEDURE — 94761 N-INVAS EAR/PLS OXIMETRY MLT: CPT

## 2020-04-10 PROCEDURE — C9399 UNCLASSIFIED DRUGS OR BIOLOG: HCPCS | Performed by: STUDENT IN AN ORGANIZED HEALTH CARE EDUCATION/TRAINING PROGRAM

## 2020-04-10 PROCEDURE — 36415 COLL VENOUS BLD VENIPUNCTURE: CPT

## 2020-04-10 PROCEDURE — 97535 SELF CARE MNGMENT TRAINING: CPT

## 2020-04-10 PROCEDURE — 80053 COMPREHEN METABOLIC PANEL: CPT

## 2020-04-10 PROCEDURE — 63600175 PHARM REV CODE 636 W HCPCS: Performed by: STUDENT IN AN ORGANIZED HEALTH CARE EDUCATION/TRAINING PROGRAM

## 2020-04-10 PROCEDURE — 12000002 HC ACUTE/MED SURGE SEMI-PRIVATE ROOM

## 2020-04-10 PROCEDURE — 25000003 PHARM REV CODE 250: Performed by: INTERNAL MEDICINE

## 2020-04-10 PROCEDURE — 83735 ASSAY OF MAGNESIUM: CPT

## 2020-04-10 PROCEDURE — 85025 COMPLETE CBC W/AUTO DIFF WBC: CPT

## 2020-04-10 PROCEDURE — 97161 PT EVAL LOW COMPLEX 20 MIN: CPT

## 2020-04-10 PROCEDURE — 97165 OT EVAL LOW COMPLEX 30 MIN: CPT

## 2020-04-10 PROCEDURE — 25000003 PHARM REV CODE 250: Performed by: STUDENT IN AN ORGANIZED HEALTH CARE EDUCATION/TRAINING PROGRAM

## 2020-04-10 PROCEDURE — 97116 GAIT TRAINING THERAPY: CPT

## 2020-04-10 RX ORDER — IPRATROPIUM BROMIDE AND ALBUTEROL SULFATE 2.5; .5 MG/3ML; MG/3ML
3 SOLUTION RESPIRATORY (INHALATION) EVERY 4 HOURS PRN
Status: DISCONTINUED | OUTPATIENT
Start: 2020-04-10 | End: 2020-04-11

## 2020-04-10 RX ADMIN — BENAZEPRIL HYDROCHLORIDE 20 MG: 20 TABLET, COATED ORAL at 08:04

## 2020-04-10 RX ADMIN — ENOXAPARIN SODIUM 40 MG: 100 INJECTION SUBCUTANEOUS at 09:04

## 2020-04-10 RX ADMIN — DOCUSATE SODIUM 200 MG: 100 CAPSULE, LIQUID FILLED ORAL at 08:04

## 2020-04-10 RX ADMIN — LEVOTHYROXINE SODIUM 75 MCG: 25 TABLET ORAL at 05:04

## 2020-04-10 RX ADMIN — PREDNISONE 40 MG: 20 TABLET ORAL at 08:04

## 2020-04-10 RX ADMIN — INSULIN ASPART 5 UNITS: 100 INJECTION, SOLUTION INTRAVENOUS; SUBCUTANEOUS at 09:04

## 2020-04-10 RX ADMIN — ASPIRIN 81 MG: 81 TABLET, DELAYED RELEASE ORAL at 08:04

## 2020-04-10 RX ADMIN — DOCUSATE SODIUM 200 MG: 100 CAPSULE, LIQUID FILLED ORAL at 09:04

## 2020-04-10 RX ADMIN — OXYBUTYNIN CHLORIDE 10 MG: 10 TABLET, EXTENDED RELEASE ORAL at 08:04

## 2020-04-10 RX ADMIN — OLANZAPINE 5 MG: 5 TABLET, FILM COATED ORAL at 08:04

## 2020-04-10 RX ADMIN — PREGABALIN 150 MG: 75 CAPSULE ORAL at 09:04

## 2020-04-10 RX ADMIN — ATORVASTATIN CALCIUM 20 MG: 20 TABLET, FILM COATED ORAL at 09:04

## 2020-04-10 RX ADMIN — INSULIN DETEMIR 5 UNITS: 100 INJECTION, SOLUTION SUBCUTANEOUS at 09:04

## 2020-04-10 RX ADMIN — ALBUTEROL SULFATE 2 PUFF: 90 AEROSOL, METERED RESPIRATORY (INHALATION) at 08:04

## 2020-04-10 RX ADMIN — MAGNESIUM SULFATE 2 G: 2 INJECTION INTRAVENOUS at 08:04

## 2020-04-10 RX ADMIN — LEVOFLOXACIN 500 MG: 5 INJECTION, SOLUTION INTRAVENOUS at 10:04

## 2020-04-10 RX ADMIN — PANTOPRAZOLE SODIUM 40 MG: 40 TABLET, DELAYED RELEASE ORAL at 08:04

## 2020-04-10 NOTE — SUBJECTIVE & OBJECTIVE
Interval History: afvss, sating well on 3L NC, working with pt/ot recommending  pt/ot.    Seen and examined. Denies any CP, cough, N/V/D, headaches, fever or chills. + sob with activity.      Review of Systems   As above  Objective:     Vital Signs (Most Recent):  Temp: 97.6 °F (36.4 °C) (04/10/20 0705)  Pulse: 77 (04/10/20 0800)  Resp: 16 (04/10/20 0800)  BP: 123/69 (04/10/20 0705)  SpO2: 95 % (04/10/20 0800) Vital Signs (24h Range):  Temp:  [97.6 °F (36.4 °C)-98.8 °F (37.1 °C)] 97.6 °F (36.4 °C)  Pulse:  [] 77  Resp:  [14-20] 16  SpO2:  [95 %-98 %] 95 %  BP: ()/(63-86) 123/69     Weight: 74.8 kg (164 lb 14.5 oz)  Body mass index is 29.21 kg/m².    Intake/Output Summary (Last 24 hours) at 4/10/2020 1324  Last data filed at 4/10/2020 1200  Gross per 24 hour   Intake 1420 ml   Output --   Net 1420 ml      Physical Exam   Constitutional: He is oriented to person, place, and time. He appears well-developed. No distress.   HENT:   Head: Normocephalic and atraumatic.   Mouth/Throat: Oropharynx is clear and moist.   O2 per NC  Face mask in place   Eyes: Pupils are equal, round, and reactive to light. EOM are normal.   Neck: Normal range of motion. Neck supple.   Cardiovascular: Regular rhythm.   No murmur heard.  Pulmonary/Chest: Effort normal. He has wheezes.   Abdominal: Soft. He exhibits no distension. There is no tenderness. There is no rebound and no guarding.   Genitourinary:   Genitourinary Comments: NO lawson   Musculoskeletal: Normal range of motion.   Neurological: He is alert and oriented to person, place, and time. No cranial nerve deficit or sensory deficit.   Skin: No rash noted.   Psychiatric: He has a normal mood and affect.   Nursing note and vitals reviewed.      Significant Labs:   CBC:   Recent Labs   Lab 04/09/20  0519 04/10/20  0432   WBC 5.72 7.47   HGB 10.7* 10.6*   HCT 33.3* 32.2*    329     CMP:   Recent Labs   Lab 04/09/20  0519 04/10/20  0432    132*   K 4.2 4.9   CL  97 96   CO2 27 26   * 258*   BUN 17 24*   CREATININE 1.1 1.1   CALCIUM 9.3 9.2   PROT 6.5 6.6   ALBUMIN 3.3* 3.4*   BILITOT 0.5 0.8   ALKPHOS 46* 45*   AST 41* 30   ALT 46* 39   ANIONGAP 13 10   EGFRNONAA >60.0 >60.0     All pertinent labs within the past 24 hours have been reviewed.    Significant Imaging: no new images today

## 2020-04-10 NOTE — PROGRESS NOTES
[facility] Medicine  Progress Note    Patient Name: Bret Garcia  MRN: [id number]  Patient Class: IP- Inpatient   Admission Date: 4/7/2020  Length of Stay: 3 days  Attending Physician: Eliane Fields DO  Primary Care Provider: Pascual Avalos MD        Subjective:     Principal Problem:Pneumonia  Chief Complaint   Patient presents with    Cough     5 days, fever,diarrhea     Interval History: afvss, sating well on 3L NC, working with pt/ot recommending  pt/ot.    Seen and examined. Denies any CP, cough, N/V/D, headaches, fever or chills. + sob with activity.      Review of Systems   As above  Objective:     Vital Signs (Most Recent):  Temp: 97.6 °F (36.4 °C) (04/10/20 0705)  Pulse: 77 (04/10/20 0800)  Resp: 16 (04/10/20 0800)  BP: 123/69 (04/10/20 0705)  SpO2: 95 % (04/10/20 0800) Vital Signs (24h Range):  Temp:  [97.6 °F (36.4 °C)-98.8 °F (37.1 °C)] 97.6 °F (36.4 °C)  Pulse:  [] 77  Resp:  [14-20] 16  SpO2:  [95 %-98 %] 95 %  BP: ()/(63-86) 123/69     Weight: 74.8 kg (164 lb 14.5 oz)  Body mass index is 29.21 kg/m².    Intake/Output Summary (Last 24 hours) at 4/10/2020 1324  Last data filed at 4/10/2020 1200  Gross per 24 hour   Intake 1420 ml   Output --   Net 1420 ml      Physical Exam   Constitutional: He is oriented to person, place, and time. He appears well-developed. No distress.   HENT:   Head: Normocephalic and atraumatic.   Mouth/Throat: Oropharynx is clear and moist.   O2 per NC  Face mask in place   Eyes: Pupils are equal, round, and reactive to light. EOM are normal.   Neck: Normal range of motion. Neck supple.   Cardiovascular: Regular rhythm.   No murmur heard.  Pulmonary/Chest: Effort normal. He has wheezes.   Abdominal: Soft. He exhibits no distension. There is no tenderness. There is no rebound and no guarding.   Genitourinary:   Genitourinary Comments: NO lawson   Musculoskeletal: Normal range of motion.   Neurological: He is alert and oriented to person,  place, and time. No cranial nerve deficit or sensory deficit.   Skin: No rash noted.   Psychiatric: He has a normal mood and affect.   Nursing note and vitals reviewed.      Significant Labs:   CBC:   Recent Labs   Lab 04/09/20  0519 04/10/20  0432   WBC 5.72 7.47   HGB 10.7* 10.6*   HCT 33.3* 32.2*    329     CMP:   Recent Labs   Lab 04/09/20  0519 04/10/20  0432    132*   K 4.2 4.9   CL 97 96   CO2 27 26   * 258*   BUN 17 24*   CREATININE 1.1 1.1   CALCIUM 9.3 9.2   PROT 6.5 6.6   ALBUMIN 3.3* 3.4*   BILITOT 0.5 0.8   ALKPHOS 46* 45*   AST 41* 30   ALT 46* 39   ANIONGAP 13 10   EGFRNONAA >60.0 >60.0     All pertinent labs within the past 24 hours have been reviewed.    Significant Imaging: no new images today      Assessment/Plan:      * Pneumonia  Acute hypoxemic respiratory failure  CXR with bilateral infiltrates- improved on 4/9 cxr  Covid swab negative  Ferritin level normal, No Lymphopenia on admission  Mild elevation in lactic acid on admission  now wnl  Elevation in CRP 3.71--> 1.66  Oxygen support via NC to maintain O2 sats > 90%  Supportive care  IV levofloxacin  augusto  IS, contreras   Pt/ot : recs hh ptot     Hypertension  Chronic medical condition.  Continuing home medication.  Monitoring.        Type 2 diabetes mellitus with complication  ISS.  Accuchecks.         Anemia, chronic disease  Chronic medical condition.  Continuing home medication.  Monitoring.        History of prostate cancer  Chronic medical condition.  Continuing home medication.  Monitoring.       VTE Risk Mitigation (From admission, onward)         Ordered     enoxaparin injection 40 mg  Daily      04/07/20 1958     IP VTE HIGH RISK PATIENT  Once      04/07/20 1958                Eliane Fields DO  Department of Hospital Medicine   [facility]

## 2020-04-10 NOTE — PT/OT/SLP EVAL
Occupational Therapy   Evaluation and Discharge Note    Name: Bret Garcia  MRN: [id number]  Admitting Diagnosis:  Pneumonia      Recommendations:     Discharge Recommendations:    Discharge Equipment Recommendations:  none  Barriers to discharge:  None    Assessment:     Bret Garcia is a 74 y.o. male with a medical diagnosis of Pneumonia. At this time, patient is functioning at their prior level of function and does not require further acute OT services.     Plan:     During this hospitalization, patient does not require further acute OT services.  Please re-consult if situation changes.    · Plan of Care Reviewed with: patient    Subjective     Chief Complaint: None  Patient/Family Comments/goals: to go home.    Occupational Profile:  Living Environment: lives with spouse in a 1 story home.  Previous level of function: independent for ADLs, IADLs and driving.  Roles and Routines: primary homemaker  Equipment Used at home:  none  Assistance upon Discharge: Spouse    Pain/Comfort:  · Pain Rating 1: 0/10  · Pain Rating Post-Intervention 1: 0/10    Patients cultural, spiritual, Druze conflicts given the current situation: no    Objective:     Communicated with: nurse prior to session.  Patient found supine with telemetry, pulse ox (continuous), oxygen, peripheral IV upon OT entry to room.    General Precautions: Standard, fall   Orthopedic Precautions:N/A   Braces: N/A     Occupational Performance:    Bed Mobility:    · Patient completed Scooting/Bridging with supervision  · Patient completed Supine to Sit with supervision  · Patient completed Sit to Supine with supervision    Functional Mobility/Transfers:  · Patient completed Sit <> Stand Transfer with supervision  with  no assistive device   · Functional Mobility: ambulated 10 feet in the hospital room with supervision.    Activities of Daily Living:  · Grooming: supervision to wash face sitting EOB.  · Upper Body Dressing: supervision to [facility]  gown sitting EOB.  · Lower Body Dressing: supervision to don/doff socks sitting EOB.    Cognitive/Visual Perceptual:  Cognitive/Psychosocial Skills:     -       Oriented to: Person, Place, Time and Situation   -       Follows Commands/attention:Follows multistep  commands  -       Communication: clear/fluent  -       Memory: No Deficits noted  -       Safety awareness/insight to disability: intact   -       Mood/Affect/Coping skills/emotional control: Cooperative and Pleasant  Visual/Perceptual:      -Intact Acuity    Physical Exam:  Balance:    -       Sitting/Standing: Supervision  Upper Extremity Range of Motion:     -       Right Upper Extremity: WFL  -       Left Upper Extremity: WFL  Upper Extremity Strength:    -       Right Upper Extremity: WFL  -       Left Upper Extremity: WFL   Strength:    -       Right Upper Extremity: WFL  -       Left Upper Extremity: WFL  Fine Motor Coordination:    -       Intact    AMPAC 6 Click ADL:  AMPAC Total Score: 24    Treatment & Education:  Patient performed sitting/standing ADL activity with no safety concerns.  Education:    Patient left supine with all lines intact and call button in reach    GOALS:   Multidisciplinary Problems     Occupational Therapy Goals     Not on file                History:     Past Medical History:   Diagnosis Date    Colon polyp     Diabetes mellitus     Elevated PSA     GERD (gastroesophageal reflux disease)     Hyperlipidemia     Hypertension     Incontinence of urine     Neuropathy     Personal history of malignant neoplasm of large intestine     colon; prostate    Personal history of malignant neoplasm of prostate        Past Surgical History:   Procedure Laterality Date    APPENDECTOMY      COLON SURGERY  2005    resection    PROSTATE SURGERY  2006    prostatectomy    ROTATOR CUFF REPAIR      Rt shoulder    SHOULDER SURGERY      TONSILLECTOMY, ADENOIDECTOMY      as a baby       Time Tracking:     OT Date of Treatment:     OT Start Time: 1109  OT Stop Time: 1141  OT Total Time (min): 32 min    Billable Minutes:Evaluation 10  Self Care/Home Management 22    Breezy Snow OT  4/10/2020

## 2020-04-10 NOTE — PT/OT/SLP EVAL
Physical Therapy Evaluation    Patient Name:  Bret Garcia   MRN:  [id number]    Recommendations:     Discharge Recommendations:  home health PT, home with home health   Discharge Equipment Recommendations: walker, rolling   Barriers to discharge: patient confusion    Assessment:     Bret Garcia is a 74 y.o. male admitted with a medical diagnosis of Pneumonia.  Prior to admission, patient c/o SOB and fever intermittently at home for the past week.  He presents with the following impairments/functional limitations:  weakness, impaired self care skills, decreased safety awareness, impaired endurance, impaired functional mobilty, gait instability, impaired cognition, decreased lower extremity function, impaired cardiopulmonary response to activity.    Rehab Prognosis: Fair; patient would benefit from acute skilled PT services to address these deficits and reach maximum level of function.    Recent Surgery: * No surgery found *      Plan:     During this hospitalization, patient to be seen 6 x/week to address the identified rehab impairments via gait training, therapeutic activities, therapeutic exercises and progress toward the following goals:    · Plan of Care Expires:  04/24/20    Subjective     Chief Complaint: generalized weakness  Patient/Family Comments/goals: improve overall strength/mobility  Pain/Comfort:  · Pain Rating 1: 0/10  · Pain Rating 2: 0/10    Patients cultural, spiritual, Episcopalian conflicts given the current situation:      Living Environment:  Lives w/ spouse in 1-story home (no steps).  Prior to admission, patients level of function was supervision needed.  Equipment used at home: none.  DME owned (not currently used): none.  Upon discharge, patient will have assistance from family.    Objective:     Communicated with nurse prior to session.  Patient found supine with blood pressure cuff, oxygen, peripheral IV, telemetry  upon PT entry to room.    General Precautions: Standard, fall    Orthopedic Precautions:N/A   Braces: N/A     Exams:  · RLE ROM: WFL  · RLE Strength: Deficits: grossly 4-/5  · LLE ROM: WFL  · LLE Strength: Deficits: grossly 4-/5    Functional Mobility:  · Bed Mobility:     · Scooting: stand by assistance  · Supine to Sit: stand by assistance  · Sit to Supine: stand by assistance  · Transfers:     · Sit to Stand:  minimum assistance with rolling walker  · Gait: 75 ft w/ RW and CGA/2 liters of oxy      Therapeutic Activities and Exercises:   n/a    AM-PAC 6 CLICK MOBILITY  Total Score:16     Patient left supine with all lines intact, call button in reach and bed alarm on.    GOALS:   Multidisciplinary Problems     Physical Therapy Goals        Problem: Physical Therapy Goal    Goal Priority Disciplines Outcome Goal Variances Interventions   Physical Therapy Goal     PT, PT/OT      Description:  Goals to be met by: 2020     Patient will increase functional independence with mobility by performin. Supine to sit with Stand-by Assistance  2. Bed to chair transfer with Minimal Assistance using Rolling Walker  3. Gait  x 125 feet with Contact Guard Assistance using Rolling Walker.   4. Lower extremity exercise program x 20 reps, with assistance as needed                      History:     Past Medical History:   Diagnosis Date    Colon polyp     Diabetes mellitus     Elevated PSA     GERD (gastroesophageal reflux disease)     Hyperlipidemia     Hypertension     Incontinence of urine     Neuropathy     Personal history of malignant neoplasm of large intestine     colon; prostate    Personal history of malignant neoplasm of prostate        Past Surgical History:   Procedure Laterality Date    APPENDECTOMY      COLON SURGERY      resection    PROSTATE SURGERY  2006    prostatectomy    ROTATOR CUFF REPAIR      Rt shoulder    SHOULDER SURGERY      TONSILLECTOMY, ADENOIDECTOMY      as a baby       Time Tracking:     PT Received On: 04/10/20  PT Start Time:  0900     PT Stop Time: 0930  PT Total Time (min): 30 min     Billable Minutes: Evaluation 15 and Gait Training 15      Jayro Massey, PT  04/10/2020

## 2020-04-10 NOTE — NURSING
Notifed Dr Santos of lab unable to obtain lab specimen and that previous result was from  fingerstick.    Called Luis for assistance to obtain lab sample

## 2020-04-10 NOTE — CARE UPDATE
04/10/20 0800   PRE-TX-O2   SpO2 95 %   Pulse Oximetry Type Intermittent   $ Pulse Oximetry - Multiple Charge Pulse Oximetry - Multiple   Pulse 77   Resp 16   Inhaler   $ Inhaler Charges MDI (Metered Dose Inahler) Treatment   Daily Review of Necessity (Inhaler) completed   Respiratory Treatment Status (Inhaler) given   Treatment Route (Inhaler) spacer/holding chamber   Patient Position (Inhaler) HOB elevated   Post Treatment Assessment (Inhaler) breath sounds improved   Peak Flow   PEFR PREtreatment (L/Min) 2

## 2020-04-11 VITALS
OXYGEN SATURATION: 94 % | WEIGHT: 164.88 LBS | HEIGHT: 63 IN | RESPIRATION RATE: 18 BRPM | BODY MASS INDEX: 29.21 KG/M2 | DIASTOLIC BLOOD PRESSURE: 82 MMHG | TEMPERATURE: 99 F | HEART RATE: 88 BPM | SYSTOLIC BLOOD PRESSURE: 125 MMHG

## 2020-04-11 PROBLEM — J96.01 ACUTE HYPOXEMIC RESPIRATORY FAILURE: Status: RESOLVED | Noted: 2020-04-07 | Resolved: 2020-04-11

## 2020-04-11 LAB
ALBUMIN SERPL BCP-MCNC: 3.6 G/DL (ref 3.5–5.2)
ALP SERPL-CCNC: 44 U/L (ref 55–135)
ALT SERPL W/O P-5'-P-CCNC: 35 U/L (ref 10–44)
ANION GAP SERPL CALC-SCNC: 9 MMOL/L (ref 8–16)
AST SERPL-CCNC: 28 U/L (ref 10–40)
BASOPHILS # BLD AUTO: 0.01 K/UL (ref 0–0.2)
BASOPHILS NFR BLD: 0.1 % (ref 0–1.9)
BILIRUB SERPL-MCNC: 0.8 MG/DL (ref 0.1–1)
BUN SERPL-MCNC: 30 MG/DL (ref 8–23)
CALCIUM SERPL-MCNC: 9.3 MG/DL (ref 8.7–10.5)
CHLORIDE SERPL-SCNC: 99 MMOL/L (ref 95–110)
CO2 SERPL-SCNC: 27 MMOL/L (ref 23–29)
CREAT SERPL-MCNC: 1.2 MG/DL (ref 0.5–1.4)
DIFFERENTIAL METHOD: ABNORMAL
EOSINOPHIL # BLD AUTO: 0 K/UL (ref 0–0.5)
EOSINOPHIL NFR BLD: 0 % (ref 0–8)
ERYTHROCYTE [DISTWIDTH] IN BLOOD BY AUTOMATED COUNT: 11.9 % (ref 11.5–14.5)
EST. GFR  (AFRICAN AMERICAN): >60 ML/MIN/1.73 M^2
EST. GFR  (NON AFRICAN AMERICAN): 59.6 ML/MIN/1.73 M^2
GLUCOSE SERPL-MCNC: 178 MG/DL (ref 70–110)
GLUCOSE SERPL-MCNC: 196 MG/DL (ref 70–110)
HCT VFR BLD AUTO: 31.1 % (ref 40–54)
HGB BLD-MCNC: 10.4 G/DL (ref 14–18)
IMM GRANULOCYTES # BLD AUTO: 0.1 K/UL (ref 0–0.04)
IMM GRANULOCYTES NFR BLD AUTO: 0.9 % (ref 0–0.5)
LYMPHOCYTES # BLD AUTO: 1.7 K/UL (ref 1–4.8)
LYMPHOCYTES NFR BLD: 15.6 % (ref 18–48)
MAGNESIUM SERPL-MCNC: 1.9 MG/DL (ref 1.6–2.6)
MCH RBC QN AUTO: 30.2 PG (ref 27–31)
MCHC RBC AUTO-ENTMCNC: 33.4 G/DL (ref 32–36)
MCV RBC AUTO: 90 FL (ref 82–98)
MONOCYTES # BLD AUTO: 0.8 K/UL (ref 0.3–1)
MONOCYTES NFR BLD: 7.3 % (ref 4–15)
NEUTROPHILS # BLD AUTO: 8.2 K/UL (ref 1.8–7.7)
NEUTROPHILS NFR BLD: 76.1 % (ref 38–73)
NRBC BLD-RTO: 0 /100 WBC
PLATELET # BLD AUTO: 373 K/UL (ref 150–350)
PMV BLD AUTO: 9.3 FL (ref 9.2–12.9)
POTASSIUM SERPL-SCNC: 4.3 MMOL/L (ref 3.5–5.1)
PROT SERPL-MCNC: 6.6 G/DL (ref 6–8.4)
RBC # BLD AUTO: 3.44 M/UL (ref 4.6–6.2)
SODIUM SERPL-SCNC: 135 MMOL/L (ref 136–145)
WBC # BLD AUTO: 10.73 K/UL (ref 3.9–12.7)

## 2020-04-11 PROCEDURE — 83735 ASSAY OF MAGNESIUM: CPT

## 2020-04-11 PROCEDURE — 36415 COLL VENOUS BLD VENIPUNCTURE: CPT

## 2020-04-11 PROCEDURE — 94761 N-INVAS EAR/PLS OXIMETRY MLT: CPT

## 2020-04-11 PROCEDURE — 99900035 HC TECH TIME PER 15 MIN (STAT)

## 2020-04-11 PROCEDURE — 85025 COMPLETE CBC W/AUTO DIFF WBC: CPT

## 2020-04-11 PROCEDURE — 27000221 HC OXYGEN, UP TO 24 HOURS

## 2020-04-11 PROCEDURE — 25000003 PHARM REV CODE 250: Performed by: INTERNAL MEDICINE

## 2020-04-11 PROCEDURE — 80053 COMPREHEN METABOLIC PANEL: CPT

## 2020-04-11 PROCEDURE — 63600175 PHARM REV CODE 636 W HCPCS: Performed by: STUDENT IN AN ORGANIZED HEALTH CARE EDUCATION/TRAINING PROGRAM

## 2020-04-11 PROCEDURE — 25000003 PHARM REV CODE 250: Performed by: STUDENT IN AN ORGANIZED HEALTH CARE EDUCATION/TRAINING PROGRAM

## 2020-04-11 PROCEDURE — 97116 GAIT TRAINING THERAPY: CPT

## 2020-04-11 RX ORDER — PREDNISONE 20 MG/1
40 TABLET ORAL DAILY
Qty: 6 TABLET | Refills: 0 | Status: SHIPPED | OUTPATIENT
Start: 2020-04-12 | End: 2020-04-15

## 2020-04-11 RX ORDER — LEVOFLOXACIN 500 MG/1
500 TABLET, FILM COATED ORAL DAILY
Qty: 2 TABLET | Refills: 0 | Status: SHIPPED | OUTPATIENT
Start: 2020-04-12 | End: 2020-04-14

## 2020-04-11 RX ORDER — IPRATROPIUM BROMIDE AND ALBUTEROL SULFATE 2.5; .5 MG/3ML; MG/3ML
3 SOLUTION RESPIRATORY (INHALATION) EVERY 4 HOURS
Status: DISCONTINUED | OUTPATIENT
Start: 2020-04-11 | End: 2020-04-11 | Stop reason: HOSPADM

## 2020-04-11 RX ADMIN — BENAZEPRIL HYDROCHLORIDE 20 MG: 20 TABLET, COATED ORAL at 10:04

## 2020-04-11 RX ADMIN — PREDNISONE 40 MG: 20 TABLET ORAL at 10:04

## 2020-04-11 RX ADMIN — LEVOFLOXACIN 500 MG: 5 INJECTION, SOLUTION INTRAVENOUS at 09:04

## 2020-04-11 RX ADMIN — LEVOTHYROXINE SODIUM 75 MCG: 25 TABLET ORAL at 05:04

## 2020-04-11 RX ADMIN — OXYBUTYNIN CHLORIDE 10 MG: 10 TABLET, EXTENDED RELEASE ORAL at 10:04

## 2020-04-11 RX ADMIN — DOCUSATE SODIUM 200 MG: 100 CAPSULE, LIQUID FILLED ORAL at 10:04

## 2020-04-11 RX ADMIN — ASPIRIN 81 MG: 81 TABLET, DELAYED RELEASE ORAL at 10:04

## 2020-04-11 RX ADMIN — OLANZAPINE 5 MG: 5 TABLET, FILM COATED ORAL at 10:04

## 2020-04-11 RX ADMIN — PANTOPRAZOLE SODIUM 40 MG: 40 TABLET, DELAYED RELEASE ORAL at 10:04

## 2020-04-11 NOTE — PLAN OF CARE
04/07/20 2200   Respiratory Evaluation   $ Care Plan Tech Time 15 min   Evaluation For   (care plan)     
   04/08/20 0735   Patient Assessment/Suction   Level of Consciousness (AVPU) alert   Respiratory Effort Normal;Unlabored   Expansion/Accessory Muscles/Retractions expansion symmetric;no retractions;no use of accessory muscles   PRE-TX-O2   O2 Device (Oxygen Therapy) nasal cannula   $ Is the patient on Low Flow Oxygen? Yes   Flow (L/min) 3.5  (decreased to 2L)   SpO2 100 %   Pulse Oximetry Type Intermittent   $ Pulse Oximetry - Multiple Charge Pulse Oximetry - Multiple   Pulse 91   Resp 18   Temp 97.1 °F (36.2 °C)   BP (!) 141/63   Inhaler   $ Inhaler Charges MDI (Metered Dose Inahler) Treatment;Given With Spacer   Daily Review of Necessity (Inhaler) completed   Respiratory Treatment Status (Inhaler) given   Treatment Route (Inhaler) spacer/holding chamber   Patient Position (Inhaler) semi-Porter's   Signs of Intolerance (Inhaler) none   Respiratory Evaluation   $ Care Plan Tech Time 15 min     
   04/08/20 0850   Discharge Assessment   Assessment Type Discharge Planning Assessment   Confirmed/corrected address and phone number on facesheet? Yes   Assessment information obtained from? Patient   Communicated expected length of stay with patient/caregiver no   Prior to hospitilization cognitive status: Alert/Oriented   Prior to hospitalization functional status: Independent   Current cognitive status: Alert/Oriented   Current Functional Status: Independent   Facility Arrived From: home   Lives With spouse   Able to Return to Prior Arrangements yes   Is patient able to care for self after discharge? Yes   Who are your caregiver(s) and their phone number(s)? Jennifer Frazier [phone]   Patient's perception of discharge disposition home or selfcare   Readmission Within the Last 30 Days no previous admission in last 30 days   Patient currently being followed by outpatient case management? No   Patient currently receives any other outside agency services? No   Equipment Currently Used at Home none   Do you have any problems affording any of your prescribed medications? No   Is the patient taking medications as prescribed? yes   Does the patient have transportation home? Yes   Transportation Anticipated family or friend will provide   Dialysis Name and Scheduled days n/a   Does the patient receive services at the [facility]mad[facility]? No   Discharge Plan A Home   Discharge Plan B Home with family   DME Needed Upon Discharge  none   Patient/Family in Agreement with Plan yes   CM called into patients room and introduced self and asked if ok to do short interview.  Patient asked CM to call wife and get some clothes and cell , Cm  Contacted wife who will bring requested items to hospital, cm will collect items from wife at door.  
   04/09/20 2052   Patient Assessment/Suction   Level of Consciousness (AVPU) alert   Respiratory Effort Normal;Unlabored   Expansion/Accessory Muscles/Retractions no use of accessory muscles   All Lung Fields Breath Sounds diminished;clear   Cough Frequency infrequent   Cough Type good;nonproductive   PRE-TX-O2   O2 Device (Oxygen Therapy) nasal cannula   Flow (L/min) 3   SpO2 96 %   Pulse Oximetry Type Intermittent   $ Pulse Oximetry - Multiple Charge Pulse Oximetry - Multiple   Pulse 92   Resp 20   Incentive Spirometer   $ Incentive Spirometer Charges done with encouragement   Administration (IS) mouthpiece   Number of Repetitions (IS) 10   Level Incentive Spirometer (mL) 500   Patient Tolerance (IS) good   ENCOURAGE INCENTIVE SPIROMETER  
   04/10/20 2300   Patient Assessment/Suction   Level of Consciousness (AVPU) alert   Respiratory Effort Unlabored   Expansion/Accessory Muscles/Retractions no use of accessory muscles   All Lung Fields Breath Sounds clear   Rhythm/Pattern, Respiratory unlabored   Cough Frequency no cough   PRE-TX-O2   O2 Device (Oxygen Therapy) nasal cannula   Flow (L/min) 3   SpO2 95 %   Pulse 110   Resp 18   Respiratory Evaluation   $ Care Plan Tech Time 15 min   Evaluation For   (CARE PLAN)     
   04/11/20 0858   Patient Assessment/Suction   Level of Consciousness (AVPU) alert   Respiratory Effort Normal;Unlabored   Expansion/Accessory Muscles/Retractions no use of accessory muscles;no retractions;expansion symmetric   All Lung Fields Breath Sounds clear   Rhythm/Pattern, Respiratory unlabored;pattern regular;depth regular;chest wiggle adequate   Cough Frequency no cough   Cough Type nonproductive   PRE-TX-O2   O2 Device (Oxygen Therapy) nasal cannula   $ Is the patient on Low Flow Oxygen? Yes   Flow (L/min) 3   SpO2 98 %   Pulse Oximetry Type Intermittent   $ Pulse Oximetry - Multiple Charge Pulse Oximetry - Multiple   Pulse 88   Resp 18   Inhaler   $ Inhaler Charges PRN treatment not required     
   04/11/20 1129   Final Note   Assessment Type Final Discharge Note   Anticipated Discharge Disposition Home-Health   Post-Acute Status   Post-Acute Authorization Home Health   Home Health Status Referrals Sent   Patient choice form signed by patient/caregiver List from CMS Compare;List with quality metrics by geographic area provided  (Pt choice form obtained on this date at 1119. Pt selected [facility].)   Discharge Delays None known at this time     
  Problem: Physical Therapy Goal  Goal: Physical Therapy Goal  Description  Goals to be met by: 2020     Patient will increase functional independence with mobility by performin. Supine to sit with Stand-by Assistance  2. Bed to chair transfer with Minimal Assistance using Rolling Walker  3. Gait  x 125 feet with Contact Guard Assistance using Rolling Walker.   4. Lower extremity exercise program x 20 reps, with assistance as needed     Outcome: Ongoing, Progressing     
  Problem: Physical Therapy Goal  Goal: Physical Therapy Goal  Description  Goals to be met by: 2020     Patient will increase functional independence with mobility by performin. Supine to sit with Stand-by Assistance  2. Bed to chair transfer with Minimal Assistance using Rolling Walker  3. Gait  x 125 feet with Contact Guard Assistance using Rolling Walker.   4. Lower extremity exercise program x 20 reps, with assistance as needed     Outcome: Ongoing, Progressing     
Bowel sounds hypoactive, normal pitch. Abd soft, no focal tenderness.

## 2020-04-11 NOTE — NURSING
Rounds made every 2 hours. No signs of distress noted. Pt denied pain, SOB, nausea throughout night. Bed alarm on. Safety maintained. Will continue to monitor.

## 2020-04-11 NOTE — PT/OT/SLP PROGRESS
Physical Therapy Treatment    Patient Name:  Bret Garcia   MRN:  [id number]    Recommendations:     Discharge Recommendations:  home health PT, home with home health   Discharge Equipment Recommendations: RW  Barriers to discharge: None    Assessment:     Bret Garcia is a 74 y.o. male admitted with a medical diagnosis of Pneumonia.  He presents with the following impairments/functional limitations:  weakness, impaired endurance, gait instability, impaired balance, impaired self care skills, decreased coordination, impaired coordination, decreased safety awareness, impaired functional mobilty. Pt found standing up at EOB when PT enters; states he has been up walking in room and took shower by himself today; also states plans for discharge this afternoon. Agreeable to PT treatment; 93% on RA with gait training; Some confusion during gait training with navigation and turns.     Rehab Prognosis: Good; patient would benefit from acute skilled PT services to address these deficits and reach maximum level of function.    Recent Surgery: * No surgery found *      Plan:     During this hospitalization, patient to be seen 6 x/week to address the identified rehab impairments via gait training, therapeutic activities, therapeutic exercises and progress toward the following goals:    · Plan of Care Expires:  04/24/20    Subjective     Chief Complaint: none stated  Patient/Family Comments/goals: home  Pain/Comfort:  · Pain Rating 1: 0/10      Objective:     Communicated with RN prior to session.  Patient found standing up in room with telemetry upon PT entry to room.     General Precautions: Standard, fall   Orthopedic Precautions:N/A   Braces:       Functional Mobility:  · Transfers:     · Bed to Chair: stand by assistance with  no AD  using  Stand Pivot  · Gait: 130' with HHA/ CGA in hallway  · Balance: good in sitting and standing      AM-PAC 6 CLICK MOBILITY          Therapeutic Activities and Exercises:   bed mobility;  transfer training; PT educated pt/ family on importance of out of bed to chair and functional mobility to negate negative effects of prolonged bed rest. Will progress activity as tolerated by patient;     Patient left up in chair with all lines intact, call button in reach and RN notified..    GOALS:   Multidisciplinary Problems     Physical Therapy Goals        Problem: Physical Therapy Goal    Goal Priority Disciplines Outcome Goal Variances Interventions   Physical Therapy Goal     PT, PT/OT Ongoing, Progressing     Description:  Goals to be met by: 2020     Patient will increase functional independence with mobility by performin. Supine to sit with Stand-by Assistance  2. Bed to chair transfer with Minimal Assistance using Rolling Walker  3. Gait  x 125 feet with Contact Guard Assistance using Rolling Walker.   4. Lower extremity exercise program x 20 reps, with assistance as needed                      Time Tracking:     PT Received On: 20  PT Start Time: 1121     PT Stop Time: 1133  PT Total Time (min): 12 min     Billable Minutes: Gait Training 12    Treatment Type: Treatment  PT/PTA: PT     PTA Visit Number: 0     Nirali Victoria, PT  2020

## 2020-04-11 NOTE — DISCHARGE SUMMARY
"[facility] Medicine  Discharge Summary       Patient Name: Bret Garcia  MRN: [id number]  Admission Date: 4/7/2020  Hospital Length of Stay: 4 days  Discharge Date and Time:  04/11/2020 11:10 AM  Attending Physician: Eliane Fields DO   Discharging Provider: Eliane Fields DO  Primary Care Provider: Pascual Avalos MD      HPI:   74 year old male with DMII, HTN presents with cough, SOB and fevers intermittently at home for the past week.  He reports cough is non productive, persistent, moderately severe, associated with fever as high as 101 which was 3 days ago.  On presentation to the ED, he was noted to be hypoxic on room air down to upper 80s which did improve with with O2 per NC. He is a non smoker.  Denies known exposure to covid 19.  CXR revealing of bibasilar ground glass opacities right greater than left.     * No surgery found *      Hospital Course:   Patient was admitted for acute hypoxemic respiratory failure secondary to bilateral pneumonia with suspected:  19 infection.  COVID labs returned negative.  And repeat chest x-ray showed left lung base densities.  Patient received Levaquin, prednisone, DuoNebs and incentive spirometry.  Patient was able to be weaned off of supplemental oxygen and satting well on room air at time of discharge.  Given prior history of prostate cancer and densities shown on chest x-ray patient was advised to follow-up with primary care doctor for continued workup.  He voiced understanding and was agreeable with discharge home.  Patient worked with PT OT during hospital stay and recommended home health PT OT.  Physical exam on day of discharge  /82   Pulse 88   Temp 99 °F (37.2 °C) (Oral)   Resp 18   Ht 5' 3" (1.6 m)   Wt 74.8 kg (164 lb 14.5 oz)   SpO2 (!) 94%   BMI 29.21 kg/m²   Gen: nad, sitting up at edge of bed  CV: rrr no mrg  Resp: CTA B/l, satting well on room air speaking in full sentences no accessory muscles  AB: +bs soft " ntnd  Skin: dry, warm      Consults:   No new Assessment & Plan notes have been filed under this hospital service since the last note was generated.  Service: Hospital Medicine    Final Active Diagnoses:    Diagnosis Date Noted POA    PRINCIPAL PROBLEM:  Pneumonia [J18.9] 04/07/2020 Yes    Suspected Covid-19 Virus Infection [R68.89] 04/07/2020 Yes    Type 2 diabetes mellitus with complication [E11.8] 11/07/2017 Yes    Hypertension [I10] 11/07/2017 Yes    Anemia, chronic disease [D63.8] 08/08/2017 Yes    History of prostate cancer [Z85.46] 03/11/2013 Not Applicable      Problems Resolved During this Admission:    Diagnosis Date Noted Date Resolved POA    Acute hypoxemic respiratory failure [J96.01] 04/07/2020 04/11/2020 Yes       Discharged Condition: stable    Disposition: Home-Health Care [facility]    Follow Up:  Follow-up Information     Schedule an appointment as soon as possible for a visit with Pascual Avalos MD.    Specialty:  Internal Medicine  Why:  hospital follow up  Contact information:  [address]  [place] [address]  [phone]             [facility].    Specialty:  Emergency Medicine  Why:  As needed  Contact information:  [address] [id number] [phone]  Additional information:  1st floor               Patient Instructions:      Ambulatory referral/consult to Home Health   Standing Status: Future   Referral Priority: Routine Referral Type: Home Health   Referral Reason: Specialty Services Required   Requested Specialty: Home Health Services   Number of Visits Requested: 1     Diet diabetic     Notify your health care provider if you experience any of the following:  persistent nausea and vomiting or diarrhea     Notify your health care provider if you experience any of the following:  temperature >100.4     Notify your health care provider if you experience any of the following:  difficulty breathing or increased cough     Notify your health  care provider if you experience any of the following:  severe persistent headache     Notify your health care provider if you experience any of the following:  persistent dizziness, light-headedness, or visual disturbances     Activity as tolerated       Significant Diagnostic Studies: Labs:   CMP   Recent Labs   Lab 04/10/20  0432 04/11/20  0523   * 135*   K 4.9 4.3   CL 96 99   CO2 26 27   * 196*   BUN 24* 30*   CREATININE 1.1 1.2   CALCIUM 9.2 9.3   PROT 6.6 6.6   ALBUMIN 3.4* 3.6   BILITOT 0.8 0.8   ALKPHOS 45* 44*   AST 30 28   ALT 39 35   ANIONGAP 10 9   ESTGFRAFRICA >60.0 >60.0   EGFRNONAA >60.0 59.6*   , CBC   Recent Labs   Lab 04/10/20  0432 04/11/20  0523   WBC 7.47 10.73   HGB 10.6* 10.4*   HCT 32.2* 31.1*    373*    and    SARS-CoV2 (COVID-19) Qualitative PCR Not Detected         All labs within the past 24 hours have been reviewed    Pending Diagnostic Studies:     None         Medications:  Reconciled Home Medications:      Medication List      START taking these medications    levoFLOXacin 500 MG tablet  Commonly known as:  LEVAQUIN  Take 1 tablet (500 mg total) by mouth once daily. for 2 days  Start taking on:  April 12, 2020     predniSONE 20 MG tablet  Commonly known as:  DELTASONE  Take 2 tablets (40 mg total) by mouth once daily. for 3 days  Start taking on:  April 12, 2020        CONTINUE taking these medications    aspirin 81 MG EC tablet  Commonly known as:  ECOTRIN  Take 81 mg by mouth once daily.     atorvastatin 20 MG tablet  Commonly known as:  LIPITOR  Take 20 mg by mouth every evening.     benazepriL 20 MG tablet  Commonly known as:  LOTENSIN  Take 20 mg by mouth once daily.     BYDUREON 2 mg ER injection suspension  Generic drug:  exenatide microspheres  Inject 2 mGy as directed once a week.     BYETTA 5 mcg/dose (250 mcg/mL) 1.2 mL injection  Generic drug:  exenatide  Byetta 5 mcg/dose (250 mcg/mL)1.2 mL subcutaneous pen injector     levothyroxine 75 MCG  tablet  Commonly known as:  SYNTHROID  Take 1 tablet by mouth before breakfast.     metFORMIN 500 MG XR 24hr tablet  Commonly known as:  GLUCOPHAGE-XR  Take 500 mg by mouth 2 (two) times daily with meals.     MYRBETRIQ 50 mg Tb24  Generic drug:  mirabegron  TAKE 1 TABLET BY MOUTH EVERY DAY     omeprazole 40 MG capsule  Commonly known as:  PRILOSEC  Take 40 mg by mouth once daily.     pregabalin 75 MG capsule  Commonly known as:  LYRICA  Take 150 mg by mouth every evening.     ZyPREXA 5 MG tablet  Generic drug:  OLANZapine  Take 1 tablet by mouth.          Time spent on the discharge of patient: 34 minutes  Patient was seen and examined on the date of discharge and determined to be suitable for discharge.         Eliane Fields DO  Department of Hospital Medicine  [facility]

## 2020-04-12 LAB
BACTERIA BLD CULT: NORMAL
BACTERIA BLD CULT: NORMAL

## 2020-04-22 NOTE — PROGRESS NOTES
[facility] Hematology/Oncology  PROGRESS NOTE - Telemedicine Visit      Subjective:       Patient ID:   NAME: Bret Garcia : 1945     74 y.o. male    Referring Doc: Bailey  Other Physicians: Kevin Ramos Romano, Finger    Chief Complaint:  Colon ca f/u    History of Present Illness:     Patient is being seen today via a telemedicine follow-up visit in lieu of a normal in-person visit due to the recent COVID19 outbreak. The patient is currently located at home. This visit type is a virtual visit with synchronous audio with video.    He was recently hospitalized with cough with suspected pneumonia. COVID19 x 2 was negative.       The patient is doing ok. No new issues. He denies any CP, SOB, HA's or N/V. He reports that his bowels normal. He is on with his wife.    Discussed COVID19 precautions.             ROS:   GEN: normal without any fever, night sweats or weight loss  HEENT: normal with no HA's, sore throat, stiff neck, changes in vision  CV: normal with no CP, SOB, PND, THURMAN or orthopnea  PULM: normal with no SOB, cough, hemoptysis, sputum or pleuritic pain  GI: normal with no abdominal pain, nausea, vomiting, constipation, diarrhea, melanotic stools, BRBPR, or hematemesis  : normal with no hematuria, dysuria  BREAST: normal with no mass, discharge, pain  SKIN: normal with no rash, erythema, bruising, or swelling    Allergies:  Review of patient's allergies indicates:   Allergen Reactions    Codeine Other (See Comments)     Other reaction(s): Rash  hallucinations    Penicillins Other (See Comments)     Other reaction(s): Shortness of breath  Other reaction(s): Itching  Unknown/as a child       Medications:    Current Outpatient Medications:     aspirin (ECOTRIN) 81 MG EC tablet, Take 81 mg by mouth once daily., Disp: , Rfl:     atorvastatin (LIPITOR) 20 MG tablet, Take 20 mg by mouth every evening. , Disp: , Rfl:     benazepril (LOTENSIN) 20 MG tablet, Take 20 mg by mouth once daily., Disp: , Rfl:  1    BYDUREON 2 mg SSRR, Inject 2 mGy as directed once a week. , Disp: , Rfl: 1    exenatide (BYETTA) 5 mcg/dose (250 mcg/mL) 1.2 mL injection, Byetta 5 mcg/dose (250 mcg/mL)1.2 mL subcutaneous pen injector, Disp: , Rfl:     levothyroxine (SYNTHROID) 75 MCG tablet, Take 1 tablet by mouth before breakfast. , Disp: , Rfl:     metformin (GLUCOPHAGE-XR) 500 MG 24 hr tablet, Take 500 mg by mouth 2 (two) times daily with meals. , Disp: , Rfl: 1    MYRBETRIQ 50 mg Tb24, TAKE 1 TABLET BY MOUTH EVERY DAY (Patient taking differently: Take 1 tablet by mouth once daily. ), Disp: 30 tablet, Rfl: 11    olanzapine (ZYPREXA) 5 MG tablet, Take 1 tablet by mouth., Disp: , Rfl:     omeprazole (PRILOSEC) 40 MG capsule, Take 40 mg by mouth once daily., Disp: , Rfl: 1    pregabalin (LYRICA) 75 MG capsule, Take 150 mg by mouth every evening., Disp: , Rfl:     PMHx/PSHx Updates:  See patient's last visit with me on 9/17/2019  See H&P on Vol #1        Pathology:  See vol #1        Objective:     Vitals:  afebrile    Physical Examination:   GEN: no apparent distress, comfortable; AAOx3  HEAD: atraumatic and normocephalic  EYES: no conjunctival pallor or muddiness, no icterus; normal pupil reaction to ambient light  ENT: OMM, no pharyngeal erythema, external bilateral ears WNL; no visible thrush or ulcers  NECK: no masses or swelling, trachea midline, no visible LAD/LN's   CV: no palpitations; no pedal edema; no noticeable JVD or neck vein distension  CHEST: Normal respiratory effort; chest wall breath movements symmetrical; no audible wheezing  ABDOM: non-distended; no bloating  MUSC/Skeletal: ROM normal; joints visibly normal; no deformities or arthropathy  EXTREM: no clubbing, cyanosis, inflammation or swelling  SKIN: no rashes, lesions, ulcers, petechiae or subcutaneous nodules  : no lawson  NEURO: moving all 4 extremities; AAOx3; no tremors  PSYCH: normal mood, affect and behavior  LYMPH: no visible LN's or  LAD              Labs:       4/11/2020  Lab Results   Component Value Date    WBC 10.73 04/11/2020    HGB 10.4 (L) 04/11/2020    HCT 31.1 (L) 04/11/2020    MCV 90 04/11/2020     (H) 04/11/2020     BMP  Lab Results   Component Value Date     (L) 04/11/2020    K 4.3 04/11/2020    CL 99 04/11/2020    CO2 27 04/11/2020    BUN 30 (H) 04/11/2020    CREATININE 1.2 04/11/2020    CALCIUM 9.3 04/11/2020    ANIONGAP 9 04/11/2020    ESTGFRAFRICA >60.0 04/11/2020    EGFRNONAA 59.6 (A) 04/11/2020     Lab Results   Component Value Date    ALT 35 04/11/2020    AST 28 04/11/2020    ALKPHOS 44 (L) 04/11/2020    BILITOT 0.8 04/11/2020     Lab Results   Component Value Date    CEA 3.3 03/12/2020       Lab Results   Component Value Date    IRON 99 03/12/2020    TIBC 291 03/12/2020    FERRITIN 179 04/09/2020     Lab Results   Component Value Date    OGEOTTJD55 443 03/12/2020     Lab Results   Component Value Date    FOLATE >24.8 (H) 03/12/2020     CEA 0.0 - 5.0 ng/mL 3.3      SARS-CoV2 (COVID-19) Qualitative PCR Not Detected           Radiology/Diagnostic Studies:    CXR 4/11/2020:    Impression       Mild left lung base linear densities are unchanged from previous exam.             CXR  3/20/2019:    IMPRESSION:    Unchanged mild elevation of the left hemidiaphragm.    Basilar platelike atelectasis or scarring.            11/8/2017  CT abdom:  Impression       1.  Acute, partial small bowel obstruction with a relative zone of transition in the mid abdomen near the site of a small, midline ventral abdominal hernia with herniation of a short segment of small bowel located just inferior to the patient's ventral abdominal mesh. No signs of strangulation noted. There is a moderate volume of colonic fecal material and gas noted.    2. Additional findings:  -Prior prostatectomy and artificial urinary sphincter placement.  -Probable prior right hemicolectomy.  -Mild left basilar atelectasis and elevated left hemidiaphragm.          I have reviewed all available lab results and radiology reports.    Assessment/Plan:   (1) 74 y.o. male with diagnosis of colon cancer in 2005  - followed by Dr Ramos with GI with recent colonoscopy on Aug 6th 2018 with two polyps removed  - prior partial SBO in Nov 2016  - latest CEA is at 3.3      (2) Prior pseudotumor of lung - s/p resection   - followed by Dr Brown with Pulm    (3) Prior prostate CA - followed by Dr Urbina with     (4) Steatosis of liver with chronic LFT elevations - also followed by Dr Ramos with GI  - he had an SBO in Nov 2017 and was hospitalized at [facility]    (5) Multifactorial mild anemia - chronic disease, chronic renal and iron deficiency components  - latest hgb adequate at 10.4  - iron and ferritin good    (6) Mild B12 deficiency - on OTC B12 - level at 793 and good    (7) CRI - followed by Dr Patton    (8) S/p left rotator cuff in March 2018 with Dr Moura    1. B12 deficiency     2. Other iron deficiency anemia     3. History of prostate cancer     4. History of colon cancer     5. Anemia, chronic disease             PLAN:  1. Repeat CXR in 6 months  2. Continue to check basic labs and CEA q 6 months   3. F/U with PCP, GI, Pulm and   4. RTC in 6 months    Fax note to Pooja Avalos Romano, [place], [place]    Total Time spent on patient:    I spent over 25 mins of time with the patient. Reviewed results of the recently ordered labs, tests, reports and studies; made directives with regards to the results. Over half of this time was spent couseling and coordinating care, making treatment and analytical decisions; ordering necessary labs, tests and studies; and discussing treatment options and setting up treatment plan(s) if indicated.          Discussion:       COVID-19 Discussion:    I had long discussion with patient and any applicable family about the COVID-19 coronavirus epidemic and the recommended precautions with regard to cancer and/or hematology patients. I have  re-iterated the CDC recommendations for adequate hand washing, use of hand -like products, and coughing into elbow, etc. In addition, especially for our patients who are on chemotherapy and/or our otherwise immunocompromised patients, I have recommended avoidance of crowds, including movie theaters, restaurants, churches, etc. I have recommended avoidance of any sick or symptomatic family members and/or friends. I have also recommended avoidance of any raw and unwashed food products, and general avoidance of food items that have not been prepared by themselves. The patient has been asked to call us immediately with any symptom developments, issues, questions or other general concerns.       Telemedicine Statement:    The patient acknowledged and agreed to the audio/video encounter and the patient who is being provided medical services by telemedicine is:  (1) informed of the relationship between the physician and patient and the respective role of any other health care provider with respect to management of the patient; and (2) notified that he or she may decline to receive medical services by telemedicine and may withdraw from such care at any time.    I have explained all of the above in detail and the patient understands all of the current recommendation(s). I have answered all of their questions to the best of my ability and to their complete satisfaction.   The patient is to continue with the current management plan.            Electronically signed by Robert Bonilla MD      Answers for HPI/ROS submitted by the patient on 4/22/2020   appetite change : No  unexpected weight change: No  visual disturbance: No  cough: Yes  shortness of breath: No  chest pain: No  abdominal pain: No  diarrhea: Yes  frequency: Yes  back pain: No  rash: No  headaches: Yes  adenopathy: No  nervous/ anxious: No

## 2020-04-23 ENCOUNTER — OFFICE VISIT (OUTPATIENT)
Dept: HEMATOLOGY/ONCOLOGY | Facility: CLINIC | Age: 75
End: 2020-04-23
Payer: MEDICARE

## 2020-04-23 DIAGNOSIS — D50.8 OTHER IRON DEFICIENCY ANEMIA: ICD-10-CM

## 2020-04-23 DIAGNOSIS — E53.8 B12 DEFICIENCY: Primary | ICD-10-CM

## 2020-04-23 DIAGNOSIS — D63.8 ANEMIA, CHRONIC DISEASE: ICD-10-CM

## 2020-04-23 DIAGNOSIS — Z85.038 HISTORY OF COLON CANCER: ICD-10-CM

## 2020-04-23 DIAGNOSIS — Z85.46 HISTORY OF PROSTATE CANCER: ICD-10-CM

## 2020-04-23 PROCEDURE — 99213 PR OFFICE/OUTPT VISIT, EST, LEVL III, 20-29 MIN: ICD-10-PCS | Mod: 95,,, | Performed by: INTERNAL MEDICINE

## 2020-04-23 PROCEDURE — 99213 OFFICE O/P EST LOW 20 MIN: CPT | Mod: 95,,, | Performed by: INTERNAL MEDICINE

## 2020-11-22 NOTE — PROGRESS NOTES
[facility] Hematology/Oncology  PROGRESS NOTE - Follow-up Visit      Subjective:       Patient ID:   NAME: Bret Garcia : 1945     75 y.o. male    Referring Doc: Bailey  Other Physicians: Kevin Ramos Romano, Finger    Chief Complaint:  Colon ca f/u    History of Present Illness:     Patient is being seen today in person and in clinic    He is here by himself. He saw Dr Ramos in 2020 and had endoscopy with some polyps that were removed. He has repeat scope planned for 2021     The patient is doing ok. No new issues. He denies any CP, SOB, HA's or N/V. He reports that his bowels normal.     He did not have his labs done    Discussed COVID19 precautions.             ROS:   GEN: normal without any fever, night sweats or weight loss  HEENT: normal with no HA's, sore throat, stiff neck, changes in vision  CV: normal with no CP, SOB, PND, THURMAN or orthopnea  PULM: normal with no SOB, cough, hemoptysis, sputum or pleuritic pain  GI: normal with no abdominal pain, nausea, vomiting, constipation, diarrhea, melanotic stools, BRBPR, or hematemesis  : normal with no hematuria, dysuria  BREAST: normal with no mass, discharge, pain  SKIN: normal with no rash, erythema, bruising, or swelling    Allergies:  Review of patient's allergies indicates:   Allergen Reactions    Codeine Other (See Comments)     Other reaction(s): Rash  hallucinations    Penicillins Other (See Comments)     Other reaction(s): Shortness of breath  Other reaction(s): Itching  Unknown/as a child       Medications:    Current Outpatient Medications:     aspirin (ECOTRIN) 81 MG EC tablet, Take 81 mg by mouth once daily., Disp: , Rfl:     atorvastatin (LIPITOR) 20 MG tablet, Take 20 mg by mouth every evening. , Disp: , Rfl:     benazepril (LOTENSIN) 20 MG tablet, Take 20 mg by mouth once daily., Disp: , Rfl: 1    BYDUREON 2 mg SSRR, Inject 2 mGy as directed once a week. , Disp: , Rfl: 1    exenatide (BYETTA) 5 mcg/dose (250 mcg/mL) 1.2  mL injection, Byetta 5 mcg/dose (250 mcg/mL)1.2 mL subcutaneous pen injector, Disp: , Rfl:     levothyroxine (SYNTHROID) 75 MCG tablet, Take 1 tablet by mouth before breakfast. , Disp: , Rfl:     metformin (GLUCOPHAGE-XR) 500 MG 24 hr tablet, Take 500 mg by mouth 2 (two) times daily with meals. , Disp: , Rfl: 1    MYRBETRIQ 50 mg Tb24, TAKE 1 TABLET BY MOUTH EVERY DAY (Patient taking differently: Take 1 tablet by mouth once daily. ), Disp: 30 tablet, Rfl: 11    olanzapine (ZYPREXA) 5 MG tablet, Take 1 tablet by mouth., Disp: , Rfl:     omeprazole (PRILOSEC) 40 MG capsule, Take 40 mg by mouth once daily., Disp: , Rfl: 1    pregabalin (LYRICA) 75 MG capsule, Take 150 mg by mouth every evening., Disp: , Rfl:     PMHx/PSHx Updates:  See patient's last visit with me on 4/23/2020  See H&P on Vol #1        Pathology:  See vol #1        Objective:     Vitals:  Blood pressure 123/82, pulse 88, temperature 96 °F (35.6 °C), resp. rate 18, weight 79.4 kg (175 lb).        Physical Examination:   GEN: no apparent distress, comfortable; AAOx3  HEAD: atraumatic and normocephalic  EYES: no conjunctival pallor or muddiness, no icterus; normal pupil reaction to ambient light  ENT: OMM, no pharyngeal erythema, external bilateral ears WNL; no visible thrush or ulcers  NECK: no masses or swelling, trachea midline, no visible LAD/LN's   CV: no palpitations; no pedal edema; no noticeable JVD or neck vein distension  CHEST: Normal respiratory effort; chest wall breath movements symmetrical; no audible wheezing  ABDOM: non-distended; no bloating  MUSC/Skeletal: ROM normal; joints visibly normal; no deformities or arthropathy  EXTREM: no clubbing, cyanosis, inflammation or swelling  SKIN: no rashes, lesions, ulcers, petechiae or subcutaneous nodules  : no lawson  NEURO: moving all 4 extremities; AAOx3; no tremors  PSYCH: normal mood, affect and behavior  LYMPH: no visible LN's or LAD              Labs:          Lab Results   Component  Value Date    WBC 10.73 04/11/2020    HGB 10.4 (L) 04/11/2020    HCT 31.1 (L) 04/11/2020    MCV 90 04/11/2020     (H) 04/11/2020     BMP  Lab Results   Component Value Date     (L) 04/11/2020    K 4.3 04/11/2020    CL 99 04/11/2020    CO2 27 04/11/2020    BUN 30 (H) 04/11/2020    CREATININE 1.2 04/11/2020    CALCIUM 9.3 04/11/2020    ANIONGAP 9 04/11/2020    ESTGFRAFRICA >60.0 04/11/2020    EGFRNONAA 59.6 (A) 04/11/2020     Lab Results   Component Value Date    ALT 35 04/11/2020    AST 28 04/11/2020    ALKPHOS 44 (L) 04/11/2020    BILITOT 0.8 04/11/2020     Lab Results   Component Value Date    CEA 3.3 03/12/2020       Lab Results   Component Value Date    IRON 99 03/12/2020    TIBC 291 03/12/2020    FERRITIN 179 04/09/2020     Lab Results   Component Value Date    POLTDCZH21 443 03/12/2020     Lab Results   Component Value Date    FOLATE >24.8 (H) 03/12/2020     CEA 0.0 - 5.0 ng/mL 3.3             Radiology/Diagnostic Studies:    CXR 4/11/2020:    Impression       Mild left lung base linear densities are unchanged from previous exam.             CXR  3/20/2019:    IMPRESSION:    Unchanged mild elevation of the left hemidiaphragm.    Basilar platelike atelectasis or scarring.            11/8/2017  CT abdom:  Impression       1.  Acute, partial small bowel obstruction with a relative zone of transition in the mid abdomen near the site of a small, midline ventral abdominal hernia with herniation of a short segment of small bowel located just inferior to the patient's ventral abdominal mesh. No signs of strangulation noted. There is a moderate volume of colonic fecal material and gas noted.    2. Additional findings:  -Prior prostatectomy and artificial urinary sphincter placement.  -Probable prior right hemicolectomy.  -Mild left basilar atelectasis and elevated left hemidiaphragm.         I have reviewed all available lab results and radiology reports.    Assessment/Plan:   (1) 75 y.o. male with  diagnosis of colon cancer in 2005  - followed by Dr Ramos with GI with recent colonoscopy on Aug 6th 2018 with two polyps removed  - prior partial SBO in Nov 2016  - latest CEA is at 3.3      (2) Prior pseudotumor of lung - s/p resection   - followed by Dr Brown with Pulm    (3) Prior prostate CA - followed by Dr Urbina with     (4) Steatosis of liver with chronic LFT elevations - also followed by Dr Ramos with GI  - he had an SBO in Nov 2017 and was hospitalized at [facility]    (5) Multifactorial mild anemia - chronic disease, chronic renal and iron deficiency components  - latest hgb adequate at 10.4  - iron and ferritin good    (6) Mild B12 deficiency - on OTC B12 - level at 793 and good    (7) CRI - followed by Dr Patton    (8) S/p left rotator cuff in March 2018 with Dr Moura    1. Other iron deficiency anemia     2. B12 deficiency     3. History of prostate cancer     4. History of colon cancer     5. Anemia, chronic disease             PLAN:  1. Repeat CXR due now  2. Continue to check basic labs and CEA q 6 months  - encourage compliance  3. F/U with PCP, GI, Pulm and   4. RTC in 6 months    Fax note to Pooja Avalos Romano, Dale, Richard    Total Time spent on patient:    I spent over 25 mins of time with the patient. Reviewed results of the recently ordered labs, tests, reports and studies; made directives with regards to the results. Over half of this time was spent couseling and coordinating care, making treatment and analytical decisions; ordering necessary labs, tests and studies; and discussing treatment options and setting up treatment plan(s) if indicated.          Discussion:       COVID-19 Discussion:    I had long discussion with patient and any applicable family about the COVID-19 coronavirus epidemic and the recommended precautions with regard to cancer and/or hematology patients. I have re-iterated the CDC recommendations for adequate hand washing, use of hand -like  products, and coughing into elbow, etc. In addition, especially for our patients who are on chemotherapy and/or our otherwise immunocompromised patients, I have recommended avoidance of crowds, including movie theaters, restaurants, churches, etc. I have recommended avoidance of any sick or symptomatic family members and/or friends. I have also recommended avoidance of any raw and unwashed food products, and general avoidance of food items that have not been prepared by themselves. The patient has been asked to call us immediately with any symptom developments, issues, questions or other general concerns.          I have explained all of the above in detail and the patient understands all of the current recommendation(s). I have answered all of their questions to the best of my ability and to their complete satisfaction.   The patient is to continue with the current management plan.            Electronically signed by Robert Bonilla MD

## 2020-11-23 ENCOUNTER — HOSPITAL ENCOUNTER (OUTPATIENT)
Dept: RADIOLOGY | Facility: HOSPITAL | Age: 75
Discharge: HOME | End: 2020-11-23
Attending: INTERNAL MEDICINE
Payer: MEDICARE

## 2020-11-23 ENCOUNTER — OFFICE VISIT (OUTPATIENT)
Dept: HEMATOLOGY/ONCOLOGY | Facility: CLINIC | Age: 75
End: 2020-11-23
Payer: MEDICARE

## 2020-11-23 VITALS
SYSTOLIC BLOOD PRESSURE: 123 MMHG | RESPIRATION RATE: 18 BRPM | TEMPERATURE: 96 F | BODY MASS INDEX: 31 KG/M2 | DIASTOLIC BLOOD PRESSURE: 82 MMHG | WEIGHT: 175 LBS | HEART RATE: 88 BPM

## 2020-11-23 DIAGNOSIS — D50.8 OTHER IRON DEFICIENCY ANEMIA: ICD-10-CM

## 2020-11-23 DIAGNOSIS — E53.8 B12 DEFICIENCY: ICD-10-CM

## 2020-11-23 DIAGNOSIS — D50.8 OTHER IRON DEFICIENCY ANEMIA: Primary | ICD-10-CM

## 2020-11-23 DIAGNOSIS — Z85.038 HISTORY OF COLON CANCER: ICD-10-CM

## 2020-11-23 DIAGNOSIS — Z85.46 HISTORY OF PROSTATE CANCER: ICD-10-CM

## 2020-11-23 DIAGNOSIS — D63.8 ANEMIA, CHRONIC DISEASE: ICD-10-CM

## 2020-11-23 PROCEDURE — 99214 OFFICE O/P EST MOD 30 MIN: CPT | Mod: S$GLB,,, | Performed by: INTERNAL MEDICINE

## 2020-11-23 PROCEDURE — 71046 X-RAY EXAM CHEST 2 VIEWS: CPT | Mod: TC,PO

## 2020-11-23 PROCEDURE — 99214 PR OFFICE/OUTPT VISIT, EST, LEVL IV, 30-39 MIN: ICD-10-PCS | Mod: S$GLB,,, | Performed by: INTERNAL MEDICINE

## 2020-11-27 ENCOUNTER — TELEPHONE (OUTPATIENT)
Dept: HEMATOLOGY/ONCOLOGY | Facility: CLINIC | Age: 75
End: 2020-11-27

## 2020-11-27 DIAGNOSIS — R91.8: ICD-10-CM

## 2020-11-27 DIAGNOSIS — Z85.46 HISTORY OF PROSTATE CANCER: Primary | ICD-10-CM

## 2020-11-27 NOTE — TELEPHONE ENCOUNTER
----- Message from Robert Bonilla MD sent at 11/23/2020  2:14 PM CST -----  Order CT chest to look at better

## 2020-11-27 NOTE — TELEPHONE ENCOUNTER
Called the patient and spoke with his wife Jennifer.  I instructed her that Dr. Bonilla wants to get a CT of the chest because of the angle of the chest xray it was difficult to see.  I instructed her that someone will be calling him with a date and time.

## 2020-12-01 ENCOUNTER — HOSPITAL ENCOUNTER (OUTPATIENT)
Dept: RADIOLOGY | Facility: HOSPITAL | Age: 75
Discharge: HOME | End: 2020-12-01
Attending: INTERNAL MEDICINE
Payer: MEDICARE

## 2020-12-01 DIAGNOSIS — Z85.46 HISTORY OF PROSTATE CANCER: ICD-10-CM

## 2020-12-01 DIAGNOSIS — R91.8: ICD-10-CM

## 2020-12-01 LAB
CREAT SERPL-MCNC: 1.4 MG/DL (ref 0.5–1.4)
SAMPLE: NORMAL

## 2020-12-01 PROCEDURE — 71260 CT THORAX DX C+: CPT | Mod: TC,PO

## 2020-12-01 PROCEDURE — 25500020 PHARM REV CODE 255: Mod: PO | Performed by: INTERNAL MEDICINE

## 2020-12-01 RX ADMIN — IOHEXOL 100 ML: 350 INJECTION, SOLUTION INTRAVENOUS at 03:12

## 2020-12-02 ENCOUNTER — TELEPHONE (OUTPATIENT)
Dept: HEMATOLOGY/ONCOLOGY | Facility: CLINIC | Age: 75
End: 2020-12-02

## 2020-12-02 DIAGNOSIS — Z85.46 HISTORY OF PROSTATE CANCER: Primary | ICD-10-CM

## 2020-12-02 DIAGNOSIS — R91.1 PULMONARY NODULE: ICD-10-CM

## 2020-12-02 DIAGNOSIS — Z85.038 HISTORY OF COLON CANCER: ICD-10-CM

## 2020-12-02 NOTE — TELEPHONE ENCOUNTER
----- Message from Robert Bonilla MD sent at 12/2/2020  9:04 AM CST -----  See if we can get a PET scan and I want him to f/u with the pulmonologist

## 2020-12-04 ENCOUNTER — TELEPHONE (OUTPATIENT)
Dept: HEMATOLOGY/ONCOLOGY | Facility: CLINIC | Age: 75
End: 2020-12-04

## 2020-12-04 DIAGNOSIS — R91.1 PULMONARY NODULE: ICD-10-CM

## 2020-12-04 DIAGNOSIS — Z85.46 HISTORY OF PROSTATE CANCER: Primary | ICD-10-CM

## 2020-12-04 DIAGNOSIS — Z85.038 HISTORY OF COLON CANCER: ICD-10-CM

## 2020-12-04 NOTE — TELEPHONE ENCOUNTER
Patient returned my call and I instructed him that on the CT they have a nodule that Dr. Bonilla wants him to have a Pet Scan. He instructed me that he has one on 12/17/2020 at 2 pm.  I instructed him that Dr. Bonilla wants him to see a Pulmonologist.  I asked him if he wants to see anybody in particular and he said Dr. Barbour.  I told him that I will put in a referral to Dr. Barbour and someone from that office will call him with a date and time.

## 2020-12-17 ENCOUNTER — HOSPITAL ENCOUNTER (OUTPATIENT)
Dept: RADIOLOGY | Facility: HOSPITAL | Age: 75
Discharge: HOME | End: 2020-12-17
Attending: INTERNAL MEDICINE
Payer: MEDICARE

## 2020-12-17 VITALS — BODY MASS INDEX: 30.12 KG/M2 | WEIGHT: 170 LBS | HEIGHT: 63 IN

## 2020-12-17 DIAGNOSIS — R91.1 PULMONARY NODULE: ICD-10-CM

## 2020-12-17 DIAGNOSIS — Z85.038 HISTORY OF COLON CANCER: ICD-10-CM

## 2020-12-17 DIAGNOSIS — Z85.46 HISTORY OF PROSTATE CANCER: ICD-10-CM

## 2020-12-17 LAB — GLUCOSE SERPL-MCNC: 140 MG/DL (ref 70–110)

## 2020-12-17 PROCEDURE — 78815 PET IMAGE W/CT SKULL-THIGH: CPT | Mod: TC,PO,PI

## 2020-12-17 PROCEDURE — 82962 GLUCOSE BLOOD TEST: CPT | Mod: PO

## 2020-12-18 ENCOUNTER — TELEPHONE (OUTPATIENT)
Dept: HEMATOLOGY/ONCOLOGY | Facility: CLINIC | Age: 75
End: 2020-12-18

## 2020-12-18 NOTE — TELEPHONE ENCOUNTER
----- Message from Robert Bonilla MD sent at 12/18/2020  1:45 PM CST -----  Call him with the good report

## 2021-01-01 ENCOUNTER — TELEPHONE (OUTPATIENT)
Dept: FAMILY MEDICINE | Facility: CLINIC | Age: 76
End: 2021-01-01
Payer: MEDICARE

## 2021-01-01 ENCOUNTER — LAB VISIT (OUTPATIENT)
Dept: LAB | Facility: HOSPITAL | Age: 76
End: 2021-01-01
Attending: INTERNAL MEDICINE
Payer: MEDICARE

## 2021-01-01 ENCOUNTER — OFFICE VISIT (OUTPATIENT)
Dept: FAMILY MEDICINE | Facility: CLINIC | Age: 76
End: 2021-01-01
Payer: MEDICARE

## 2021-01-01 ENCOUNTER — TELEPHONE (OUTPATIENT)
Dept: HEMATOLOGY/ONCOLOGY | Facility: CLINIC | Age: 76
End: 2021-01-01
Payer: MEDICARE

## 2021-01-01 VITALS
WEIGHT: 170.88 LBS | SYSTOLIC BLOOD PRESSURE: 100 MMHG | TEMPERATURE: 98 F | DIASTOLIC BLOOD PRESSURE: 60 MMHG | HEIGHT: 64 IN | BODY MASS INDEX: 29.17 KG/M2 | HEART RATE: 111 BPM | OXYGEN SATURATION: 96 %

## 2021-01-01 DIAGNOSIS — D63.8 ANEMIA, CHRONIC DISEASE: ICD-10-CM

## 2021-01-01 DIAGNOSIS — E03.8 OTHER SPECIFIED HYPOTHYROIDISM: ICD-10-CM

## 2021-01-01 DIAGNOSIS — E11.69 TYPE 2 DIABETES MELLITUS WITH OBESITY: Primary | ICD-10-CM

## 2021-01-01 DIAGNOSIS — E66.9 TYPE 2 DIABETES MELLITUS WITH OBESITY: Primary | ICD-10-CM

## 2021-01-01 DIAGNOSIS — K21.9 GASTROESOPHAGEAL REFLUX DISEASE WITHOUT ESOPHAGITIS: ICD-10-CM

## 2021-01-01 DIAGNOSIS — E53.8 FOLATE DEFICIENCY: ICD-10-CM

## 2021-01-01 DIAGNOSIS — Z85.038 HISTORY OF COLON CANCER: Primary | ICD-10-CM

## 2021-01-01 DIAGNOSIS — D50.8 OTHER IRON DEFICIENCY ANEMIA: ICD-10-CM

## 2021-01-01 DIAGNOSIS — Z85.038 HISTORY OF COLON CANCER: ICD-10-CM

## 2021-01-01 DIAGNOSIS — R41.3 MEMORY LOSS: ICD-10-CM

## 2021-01-01 DIAGNOSIS — I89.0 LYMPHEDEMA: ICD-10-CM

## 2021-01-01 DIAGNOSIS — E53.8 B12 DEFICIENCY: ICD-10-CM

## 2021-01-01 LAB
ALBUMIN SERPL BCP-MCNC: 4 G/DL (ref 3.5–5.2)
ALP SERPL-CCNC: 69 U/L (ref 55–135)
ALT SERPL W/O P-5'-P-CCNC: 16 U/L (ref 10–44)
ANION GAP SERPL CALC-SCNC: 8 MMOL/L (ref 8–16)
AST SERPL-CCNC: 20 U/L (ref 10–40)
BASOPHILS # BLD AUTO: 0.03 K/UL (ref 0–0.2)
BASOPHILS NFR BLD: 0.5 % (ref 0–1.9)
BILIRUB SERPL-MCNC: 0.8 MG/DL (ref 0.1–1)
BUN SERPL-MCNC: 13 MG/DL (ref 8–23)
CALCIUM SERPL-MCNC: 9.4 MG/DL (ref 8.7–10.5)
CEA SERPL-MCNC: 3.5 NG/ML (ref 0–5)
CHLORIDE SERPL-SCNC: 98 MMOL/L (ref 95–110)
CO2 SERPL-SCNC: 29 MMOL/L (ref 23–29)
CREAT SERPL-MCNC: 1.2 MG/DL (ref 0.5–1.4)
DIFFERENTIAL METHOD: ABNORMAL
EOSINOPHIL # BLD AUTO: 0.3 K/UL (ref 0–0.5)
EOSINOPHIL NFR BLD: 4.6 % (ref 0–8)
ERYTHROCYTE [DISTWIDTH] IN BLOOD BY AUTOMATED COUNT: 12.2 % (ref 11.5–14.5)
EST. GFR  (AFRICAN AMERICAN): >60 ML/MIN/1.73 M^2
EST. GFR  (NON AFRICAN AMERICAN): 58.8 ML/MIN/1.73 M^2
FERRITIN SERPL-MCNC: 67 NG/ML (ref 20–300)
FOLATE SERPL-MCNC: >24.8 NG/ML (ref 4–24)
GLUCOSE SERPL-MCNC: 138 MG/DL (ref 70–110)
HCT VFR BLD AUTO: 34.6 % (ref 40–54)
HGB BLD-MCNC: 11.5 G/DL (ref 14–18)
IMM GRANULOCYTES # BLD AUTO: 0.02 K/UL (ref 0–0.04)
IMM GRANULOCYTES NFR BLD AUTO: 0.4 % (ref 0–0.5)
IRON SERPL-MCNC: 50 UG/DL (ref 45–160)
LYMPHOCYTES # BLD AUTO: 1.7 K/UL (ref 1–4.8)
LYMPHOCYTES NFR BLD: 31.6 % (ref 18–48)
MCH RBC QN AUTO: 30.3 PG (ref 27–31)
MCHC RBC AUTO-ENTMCNC: 33.2 G/DL (ref 32–36)
MCV RBC AUTO: 91 FL (ref 82–98)
MONOCYTES # BLD AUTO: 0.3 K/UL (ref 0.3–1)
MONOCYTES NFR BLD: 5.1 % (ref 4–15)
NEUTROPHILS # BLD AUTO: 3.2 K/UL (ref 1.8–7.7)
NEUTROPHILS NFR BLD: 57.8 % (ref 38–73)
NRBC BLD-RTO: 0 /100 WBC
PLATELET # BLD AUTO: 214 K/UL (ref 150–450)
PMV BLD AUTO: 9 FL (ref 9.2–12.9)
POTASSIUM SERPL-SCNC: 4.4 MMOL/L (ref 3.5–5.1)
PROT SERPL-MCNC: 6.8 G/DL (ref 6–8.4)
RBC # BLD AUTO: 3.8 M/UL (ref 4.6–6.2)
SATURATED IRON: 18 % (ref 20–50)
SODIUM SERPL-SCNC: 135 MMOL/L (ref 136–145)
TOTAL IRON BINDING CAPACITY: 283 UG/DL (ref 250–450)
TRANSFERRIN SERPL-MCNC: 202 MG/DL (ref 200–375)
VIT B12 SERPL-MCNC: 402 PG/ML (ref 210–950)
WBC # BLD AUTO: 5.48 K/UL (ref 3.9–12.7)

## 2021-01-01 PROCEDURE — 99215 OFFICE O/P EST HI 40 MIN: CPT | Performed by: FAMILY MEDICINE

## 2021-01-01 PROCEDURE — 99214 OFFICE O/P EST MOD 30 MIN: CPT | Mod: S$PBB,,, | Performed by: FAMILY MEDICINE

## 2021-01-01 PROCEDURE — 82728 ASSAY OF FERRITIN: CPT | Performed by: INTERNAL MEDICINE

## 2021-01-01 PROCEDURE — 82607 VITAMIN B-12: CPT | Performed by: INTERNAL MEDICINE

## 2021-01-01 PROCEDURE — 82746 ASSAY OF FOLIC ACID SERUM: CPT | Performed by: INTERNAL MEDICINE

## 2021-01-01 PROCEDURE — 85025 COMPLETE CBC W/AUTO DIFF WBC: CPT | Performed by: INTERNAL MEDICINE

## 2021-01-01 PROCEDURE — 99214 PR OFFICE/OUTPT VISIT, EST, LEVL IV, 30-39 MIN: ICD-10-PCS | Mod: S$PBB,,, | Performed by: FAMILY MEDICINE

## 2021-01-01 PROCEDURE — 80053 COMPREHEN METABOLIC PANEL: CPT | Performed by: INTERNAL MEDICINE

## 2021-01-01 PROCEDURE — 82378 CARCINOEMBRYONIC ANTIGEN: CPT | Performed by: INTERNAL MEDICINE

## 2021-01-01 PROCEDURE — 84466 ASSAY OF TRANSFERRIN: CPT | Performed by: INTERNAL MEDICINE

## 2021-01-01 RX ORDER — LEVOTHYROXINE SODIUM 75 UG/1
75 TABLET ORAL
Qty: 90 TABLET | Refills: 0 | Status: SHIPPED | OUTPATIENT
Start: 2021-01-01 | End: 2022-01-01

## 2021-01-01 RX ORDER — OMEPRAZOLE 40 MG/1
40 CAPSULE, DELAYED RELEASE ORAL DAILY
Qty: 90 CAPSULE | Refills: 1 | Status: SHIPPED | OUTPATIENT
Start: 2021-01-01 | End: 2022-01-01

## 2021-01-28 ENCOUNTER — TELEPHONE (OUTPATIENT)
Dept: UROLOGY | Facility: CLINIC | Age: 76
End: 2021-01-28

## 2021-01-29 ENCOUNTER — IMMUNIZATION (OUTPATIENT)
Dept: FAMILY MEDICINE | Facility: CLINIC | Age: 76
End: 2021-01-29
Payer: MEDICARE

## 2021-01-29 DIAGNOSIS — Z23 NEED FOR VACCINATION: Primary | ICD-10-CM

## 2021-01-29 PROCEDURE — 0001A COVID-19, MRNA, LNP-S, PF, 30 MCG/0.3 ML DOSE VACCINE: ICD-10-PCS | Mod: CV19,,, | Performed by: FAMILY MEDICINE

## 2021-01-29 PROCEDURE — 91300 COVID-19, MRNA, LNP-S, PF, 30 MCG/0.3 ML DOSE VACCINE: CPT | Mod: ,,, | Performed by: FAMILY MEDICINE

## 2021-01-29 PROCEDURE — 0001A COVID-19, MRNA, LNP-S, PF, 30 MCG/0.3 ML DOSE VACCINE: CPT | Mod: CV19,,, | Performed by: FAMILY MEDICINE

## 2021-01-29 PROCEDURE — 91300 COVID-19, MRNA, LNP-S, PF, 30 MCG/0.3 ML DOSE VACCINE: ICD-10-PCS | Mod: ,,, | Performed by: FAMILY MEDICINE

## 2021-02-19 ENCOUNTER — IMMUNIZATION (OUTPATIENT)
Dept: FAMILY MEDICINE | Facility: CLINIC | Age: 76
End: 2021-02-19
Payer: MEDICARE

## 2021-02-19 DIAGNOSIS — Z23 NEED FOR VACCINATION: Primary | ICD-10-CM

## 2021-02-19 PROCEDURE — 0002A COVID-19, MRNA, LNP-S, PF, 30 MCG/0.3 ML DOSE VACCINE: ICD-10-PCS | Mod: CV19,,, | Performed by: FAMILY MEDICINE

## 2021-02-19 PROCEDURE — 91300 COVID-19, MRNA, LNP-S, PF, 30 MCG/0.3 ML DOSE VACCINE: CPT | Mod: ,,, | Performed by: FAMILY MEDICINE

## 2021-02-19 PROCEDURE — 0002A COVID-19, MRNA, LNP-S, PF, 30 MCG/0.3 ML DOSE VACCINE: CPT | Mod: CV19,,, | Performed by: FAMILY MEDICINE

## 2021-02-19 PROCEDURE — 91300 COVID-19, MRNA, LNP-S, PF, 30 MCG/0.3 ML DOSE VACCINE: ICD-10-PCS | Mod: ,,, | Performed by: FAMILY MEDICINE

## 2021-03-11 ENCOUNTER — OFFICE VISIT (OUTPATIENT)
Dept: FAMILY MEDICINE | Facility: CLINIC | Age: 76
End: 2021-03-11
Payer: MEDICARE

## 2021-03-11 VITALS
HEART RATE: 88 BPM | OXYGEN SATURATION: 96 % | DIASTOLIC BLOOD PRESSURE: 80 MMHG | SYSTOLIC BLOOD PRESSURE: 126 MMHG | BODY MASS INDEX: 30.45 KG/M2 | TEMPERATURE: 98 F | HEIGHT: 63 IN | WEIGHT: 171.88 LBS | RESPIRATION RATE: 17 BRPM

## 2021-03-11 DIAGNOSIS — Z85.46 HISTORY OF PROSTATE CANCER: ICD-10-CM

## 2021-03-11 DIAGNOSIS — I10 ESSENTIAL HYPERTENSION: ICD-10-CM

## 2021-03-11 DIAGNOSIS — G89.29 CHRONIC BILATERAL LOW BACK PAIN WITHOUT SCIATICA: ICD-10-CM

## 2021-03-11 DIAGNOSIS — Z11.59 ENCOUNTER FOR HEPATITIS C SCREENING TEST FOR LOW RISK PATIENT: ICD-10-CM

## 2021-03-11 DIAGNOSIS — M54.50 CHRONIC BILATERAL LOW BACK PAIN WITHOUT SCIATICA: ICD-10-CM

## 2021-03-11 DIAGNOSIS — E03.8 OTHER SPECIFIED HYPOTHYROIDISM: ICD-10-CM

## 2021-03-11 DIAGNOSIS — Z00.00 ENCOUNTER FOR MEDICAL EXAMINATION TO ESTABLISH CARE: Primary | ICD-10-CM

## 2021-03-11 DIAGNOSIS — E11.8 TYPE 2 DIABETES MELLITUS WITH COMPLICATION: ICD-10-CM

## 2021-03-11 DIAGNOSIS — E78.49 OTHER HYPERLIPIDEMIA: ICD-10-CM

## 2021-03-11 PROCEDURE — 99204 OFFICE O/P NEW MOD 45 MIN: CPT | Mod: S$PBB,,, | Performed by: FAMILY MEDICINE

## 2021-03-11 PROCEDURE — 99204 PR OFFICE/OUTPT VISIT, NEW, LEVL IV, 45-59 MIN: ICD-10-PCS | Mod: S$PBB,,, | Performed by: FAMILY MEDICINE

## 2021-03-11 PROCEDURE — 99215 OFFICE O/P EST HI 40 MIN: CPT | Performed by: FAMILY MEDICINE

## 2021-03-11 RX ORDER — DULOXETIN HYDROCHLORIDE 30 MG/1
30 CAPSULE, DELAYED RELEASE ORAL EVERY MORNING
COMMUNITY
Start: 2020-12-15 | End: 2021-04-12

## 2021-03-11 RX ORDER — POLYETHYLENE GLYCOL 3350 17 G/17G
POWDER, FOR SOLUTION ORAL
COMMUNITY
End: 2022-01-01

## 2021-03-11 RX ORDER — DULOXETIN HYDROCHLORIDE 60 MG/1
CAPSULE, DELAYED RELEASE ORAL
COMMUNITY
Start: 2021-01-21 | End: 2021-04-12

## 2021-03-12 ENCOUNTER — TELEPHONE (OUTPATIENT)
Dept: UROLOGY | Facility: CLINIC | Age: 76
End: 2021-03-12

## 2021-03-23 ENCOUNTER — CLINICAL SUPPORT (OUTPATIENT)
Dept: REHABILITATION | Facility: HOSPITAL | Age: 76
End: 2021-03-23
Payer: MEDICARE

## 2021-03-23 DIAGNOSIS — R26.9 GAIT DISTURBANCE: ICD-10-CM

## 2021-03-23 DIAGNOSIS — R53.81 PHYSICAL DECONDITIONING: ICD-10-CM

## 2021-03-23 DIAGNOSIS — M54.42 CHRONIC MIDLINE LOW BACK PAIN WITH BILATERAL SCIATICA: ICD-10-CM

## 2021-03-23 DIAGNOSIS — M54.50 CHRONIC BILATERAL LOW BACK PAIN WITHOUT SCIATICA: ICD-10-CM

## 2021-03-23 DIAGNOSIS — M54.41 CHRONIC MIDLINE LOW BACK PAIN WITH BILATERAL SCIATICA: ICD-10-CM

## 2021-03-23 DIAGNOSIS — G89.29 CHRONIC MIDLINE LOW BACK PAIN WITH BILATERAL SCIATICA: ICD-10-CM

## 2021-03-23 DIAGNOSIS — G89.29 CHRONIC BILATERAL LOW BACK PAIN WITHOUT SCIATICA: ICD-10-CM

## 2021-03-23 PROCEDURE — 97162 PT EVAL MOD COMPLEX 30 MIN: CPT | Mod: PN

## 2021-03-23 PROCEDURE — 97110 THERAPEUTIC EXERCISES: CPT | Mod: PN

## 2021-03-29 ENCOUNTER — CLINICAL SUPPORT (OUTPATIENT)
Dept: REHABILITATION | Facility: HOSPITAL | Age: 76
End: 2021-03-29
Payer: MEDICARE

## 2021-03-29 DIAGNOSIS — M54.42 CHRONIC MIDLINE LOW BACK PAIN WITH BILATERAL SCIATICA: ICD-10-CM

## 2021-03-29 DIAGNOSIS — M54.41 CHRONIC MIDLINE LOW BACK PAIN WITH BILATERAL SCIATICA: ICD-10-CM

## 2021-03-29 DIAGNOSIS — R53.81 PHYSICAL DECONDITIONING: ICD-10-CM

## 2021-03-29 DIAGNOSIS — R26.9 GAIT DISTURBANCE: ICD-10-CM

## 2021-03-29 DIAGNOSIS — G89.29 CHRONIC MIDLINE LOW BACK PAIN WITH BILATERAL SCIATICA: ICD-10-CM

## 2021-03-29 PROCEDURE — 97110 THERAPEUTIC EXERCISES: CPT | Mod: PN

## 2021-04-01 ENCOUNTER — CLINICAL SUPPORT (OUTPATIENT)
Dept: REHABILITATION | Facility: HOSPITAL | Age: 76
End: 2021-04-01
Payer: MEDICARE

## 2021-04-01 DIAGNOSIS — M54.42 CHRONIC MIDLINE LOW BACK PAIN WITH BILATERAL SCIATICA: ICD-10-CM

## 2021-04-01 DIAGNOSIS — R53.81 PHYSICAL DECONDITIONING: ICD-10-CM

## 2021-04-01 DIAGNOSIS — G89.29 CHRONIC MIDLINE LOW BACK PAIN WITH BILATERAL SCIATICA: ICD-10-CM

## 2021-04-01 DIAGNOSIS — R26.9 GAIT DISTURBANCE: ICD-10-CM

## 2021-04-01 DIAGNOSIS — M54.41 CHRONIC MIDLINE LOW BACK PAIN WITH BILATERAL SCIATICA: ICD-10-CM

## 2021-04-01 PROCEDURE — 97110 THERAPEUTIC EXERCISES: CPT | Mod: PN,CQ

## 2021-04-01 PROCEDURE — 97140 MANUAL THERAPY 1/> REGIONS: CPT | Mod: PN,CQ

## 2021-04-06 ENCOUNTER — CLINICAL SUPPORT (OUTPATIENT)
Dept: REHABILITATION | Facility: HOSPITAL | Age: 76
End: 2021-04-06
Payer: MEDICARE

## 2021-04-06 DIAGNOSIS — M54.42 CHRONIC MIDLINE LOW BACK PAIN WITH BILATERAL SCIATICA: ICD-10-CM

## 2021-04-06 DIAGNOSIS — R53.81 PHYSICAL DECONDITIONING: ICD-10-CM

## 2021-04-06 DIAGNOSIS — M54.41 CHRONIC MIDLINE LOW BACK PAIN WITH BILATERAL SCIATICA: ICD-10-CM

## 2021-04-06 DIAGNOSIS — R26.9 GAIT DISTURBANCE: ICD-10-CM

## 2021-04-06 DIAGNOSIS — G89.29 CHRONIC MIDLINE LOW BACK PAIN WITH BILATERAL SCIATICA: ICD-10-CM

## 2021-04-06 PROCEDURE — 97010 HOT OR COLD PACKS THERAPY: CPT | Mod: PN,CQ

## 2021-04-06 PROCEDURE — 97014 ELECTRIC STIMULATION THERAPY: CPT | Mod: PN,CQ

## 2021-04-06 PROCEDURE — 97110 THERAPEUTIC EXERCISES: CPT | Mod: PN,CQ

## 2021-04-08 ENCOUNTER — CLINICAL SUPPORT (OUTPATIENT)
Dept: REHABILITATION | Facility: HOSPITAL | Age: 76
End: 2021-04-08
Payer: MEDICARE

## 2021-04-08 DIAGNOSIS — M54.42 CHRONIC MIDLINE LOW BACK PAIN WITH BILATERAL SCIATICA: ICD-10-CM

## 2021-04-08 DIAGNOSIS — G89.29 CHRONIC MIDLINE LOW BACK PAIN WITH BILATERAL SCIATICA: ICD-10-CM

## 2021-04-08 DIAGNOSIS — R53.81 PHYSICAL DECONDITIONING: ICD-10-CM

## 2021-04-08 DIAGNOSIS — M54.41 CHRONIC MIDLINE LOW BACK PAIN WITH BILATERAL SCIATICA: ICD-10-CM

## 2021-04-08 DIAGNOSIS — R26.9 GAIT DISTURBANCE: ICD-10-CM

## 2021-04-08 PROCEDURE — 97110 THERAPEUTIC EXERCISES: CPT | Mod: PN,CQ

## 2021-04-08 PROCEDURE — 97010 HOT OR COLD PACKS THERAPY: CPT | Mod: PN,CQ

## 2021-04-08 PROCEDURE — 97035 APP MDLTY 1+ULTRASOUND EA 15: CPT | Mod: PN,CQ

## 2021-04-08 PROCEDURE — 97140 MANUAL THERAPY 1/> REGIONS: CPT | Mod: PN,CQ

## 2021-04-13 ENCOUNTER — CLINICAL SUPPORT (OUTPATIENT)
Dept: REHABILITATION | Facility: HOSPITAL | Age: 76
End: 2021-04-13
Payer: MEDICARE

## 2021-04-13 DIAGNOSIS — R26.9 GAIT DISTURBANCE: ICD-10-CM

## 2021-04-13 DIAGNOSIS — R53.81 PHYSICAL DECONDITIONING: ICD-10-CM

## 2021-04-13 DIAGNOSIS — G89.29 CHRONIC MIDLINE LOW BACK PAIN WITH BILATERAL SCIATICA: ICD-10-CM

## 2021-04-13 DIAGNOSIS — M54.42 CHRONIC MIDLINE LOW BACK PAIN WITH BILATERAL SCIATICA: ICD-10-CM

## 2021-04-13 DIAGNOSIS — M54.41 CHRONIC MIDLINE LOW BACK PAIN WITH BILATERAL SCIATICA: ICD-10-CM

## 2021-04-13 PROCEDURE — 97110 THERAPEUTIC EXERCISES: CPT | Mod: PN

## 2021-04-15 ENCOUNTER — CLINICAL SUPPORT (OUTPATIENT)
Dept: REHABILITATION | Facility: HOSPITAL | Age: 76
End: 2021-04-15
Payer: MEDICARE

## 2021-04-15 DIAGNOSIS — R53.81 PHYSICAL DECONDITIONING: ICD-10-CM

## 2021-04-15 DIAGNOSIS — R26.9 GAIT DISTURBANCE: ICD-10-CM

## 2021-04-15 DIAGNOSIS — G89.29 CHRONIC MIDLINE LOW BACK PAIN WITH BILATERAL SCIATICA: ICD-10-CM

## 2021-04-15 DIAGNOSIS — M54.41 CHRONIC MIDLINE LOW BACK PAIN WITH BILATERAL SCIATICA: ICD-10-CM

## 2021-04-15 DIAGNOSIS — M54.42 CHRONIC MIDLINE LOW BACK PAIN WITH BILATERAL SCIATICA: ICD-10-CM

## 2021-04-15 PROCEDURE — 97110 THERAPEUTIC EXERCISES: CPT | Mod: PN,CQ

## 2021-04-15 PROCEDURE — 97140 MANUAL THERAPY 1/> REGIONS: CPT | Mod: PN,CQ

## 2021-04-20 ENCOUNTER — CLINICAL SUPPORT (OUTPATIENT)
Dept: REHABILITATION | Facility: HOSPITAL | Age: 76
End: 2021-04-20
Payer: MEDICARE

## 2021-04-20 DIAGNOSIS — R26.9 GAIT DISTURBANCE: ICD-10-CM

## 2021-04-20 DIAGNOSIS — M54.41 CHRONIC MIDLINE LOW BACK PAIN WITH BILATERAL SCIATICA: ICD-10-CM

## 2021-04-20 DIAGNOSIS — G89.29 CHRONIC MIDLINE LOW BACK PAIN WITH BILATERAL SCIATICA: ICD-10-CM

## 2021-04-20 DIAGNOSIS — R53.81 PHYSICAL DECONDITIONING: ICD-10-CM

## 2021-04-20 DIAGNOSIS — M54.42 CHRONIC MIDLINE LOW BACK PAIN WITH BILATERAL SCIATICA: ICD-10-CM

## 2021-04-20 PROCEDURE — 97110 THERAPEUTIC EXERCISES: CPT | Mod: PN,CQ

## 2021-04-22 ENCOUNTER — CLINICAL SUPPORT (OUTPATIENT)
Dept: REHABILITATION | Facility: HOSPITAL | Age: 76
End: 2021-04-22
Payer: MEDICARE

## 2021-04-22 DIAGNOSIS — R26.9 GAIT DISTURBANCE: ICD-10-CM

## 2021-04-22 DIAGNOSIS — G89.29 CHRONIC MIDLINE LOW BACK PAIN WITH BILATERAL SCIATICA: ICD-10-CM

## 2021-04-22 DIAGNOSIS — M54.41 CHRONIC MIDLINE LOW BACK PAIN WITH BILATERAL SCIATICA: ICD-10-CM

## 2021-04-22 DIAGNOSIS — M54.42 CHRONIC MIDLINE LOW BACK PAIN WITH BILATERAL SCIATICA: ICD-10-CM

## 2021-04-22 DIAGNOSIS — R53.81 PHYSICAL DECONDITIONING: ICD-10-CM

## 2021-04-22 PROCEDURE — 97110 THERAPEUTIC EXERCISES: CPT | Mod: PN,CQ

## 2021-04-22 PROCEDURE — 97140 MANUAL THERAPY 1/> REGIONS: CPT | Mod: PN,CQ

## 2021-04-27 ENCOUNTER — CLINICAL SUPPORT (OUTPATIENT)
Dept: REHABILITATION | Facility: HOSPITAL | Age: 76
End: 2021-04-27
Payer: MEDICARE

## 2021-04-27 DIAGNOSIS — R26.9 GAIT DISTURBANCE: ICD-10-CM

## 2021-04-27 DIAGNOSIS — G89.29 CHRONIC MIDLINE LOW BACK PAIN WITH BILATERAL SCIATICA: ICD-10-CM

## 2021-04-27 DIAGNOSIS — R53.81 PHYSICAL DECONDITIONING: ICD-10-CM

## 2021-04-27 DIAGNOSIS — M54.41 CHRONIC MIDLINE LOW BACK PAIN WITH BILATERAL SCIATICA: ICD-10-CM

## 2021-04-27 DIAGNOSIS — M54.42 CHRONIC MIDLINE LOW BACK PAIN WITH BILATERAL SCIATICA: ICD-10-CM

## 2021-04-27 PROCEDURE — 97110 THERAPEUTIC EXERCISES: CPT | Mod: PN,CQ

## 2021-04-27 PROCEDURE — 97035 APP MDLTY 1+ULTRASOUND EA 15: CPT | Mod: PN,CQ

## 2021-04-29 ENCOUNTER — CLINICAL SUPPORT (OUTPATIENT)
Dept: REHABILITATION | Facility: HOSPITAL | Age: 76
End: 2021-04-29
Payer: MEDICARE

## 2021-04-29 PROCEDURE — 97110 THERAPEUTIC EXERCISES: CPT | Mod: PN

## 2021-05-06 ENCOUNTER — CLINICAL SUPPORT (OUTPATIENT)
Dept: REHABILITATION | Facility: HOSPITAL | Age: 76
End: 2021-05-06
Payer: MEDICARE

## 2021-05-06 ENCOUNTER — OFFICE VISIT (OUTPATIENT)
Dept: UROLOGY | Facility: CLINIC | Age: 76
End: 2021-05-06
Payer: MEDICARE

## 2021-05-06 ENCOUNTER — LAB VISIT (OUTPATIENT)
Dept: LAB | Facility: HOSPITAL | Age: 76
End: 2021-05-06
Attending: UROLOGY
Payer: MEDICARE

## 2021-05-06 VITALS
DIASTOLIC BLOOD PRESSURE: 75 MMHG | WEIGHT: 171.63 LBS | BODY MASS INDEX: 30.41 KG/M2 | HEART RATE: 90 BPM | HEIGHT: 63 IN | SYSTOLIC BLOOD PRESSURE: 120 MMHG

## 2021-05-06 DIAGNOSIS — M54.42 CHRONIC MIDLINE LOW BACK PAIN WITH BILATERAL SCIATICA: ICD-10-CM

## 2021-05-06 DIAGNOSIS — N32.81 OAB (OVERACTIVE BLADDER): Primary | ICD-10-CM

## 2021-05-06 DIAGNOSIS — R26.9 GAIT DISTURBANCE: ICD-10-CM

## 2021-05-06 DIAGNOSIS — M54.41 CHRONIC MIDLINE LOW BACK PAIN WITH BILATERAL SCIATICA: ICD-10-CM

## 2021-05-06 DIAGNOSIS — N39.41 URGE INCONTINENCE: ICD-10-CM

## 2021-05-06 DIAGNOSIS — N39.3 SUI (STRESS URINARY INCONTINENCE), MALE: ICD-10-CM

## 2021-05-06 DIAGNOSIS — R53.81 PHYSICAL DECONDITIONING: ICD-10-CM

## 2021-05-06 DIAGNOSIS — G89.29 CHRONIC MIDLINE LOW BACK PAIN WITH BILATERAL SCIATICA: ICD-10-CM

## 2021-05-06 DIAGNOSIS — Z85.46 HISTORY OF PROSTATE CANCER: ICD-10-CM

## 2021-05-06 LAB
BILIRUB SERPL-MCNC: NORMAL MG/DL
BLOOD URINE, POC: NORMAL
CLARITY, POC UA: CLEAR
COLOR, POC UA: YELLOW
COMPLEXED PSA SERPL-MCNC: <0.01 NG/ML (ref 0–4)
GLUCOSE UR QL STRIP: NORMAL
KETONES UR QL STRIP: NORMAL
LEUKOCYTE ESTERASE URINE, POC: NORMAL
NITRITE, POC UA: NORMAL
PH, POC UA: 5.5
POC RESIDUAL URINE VOLUME: 11 ML (ref 0–100)
PROTEIN, POC: NORMAL
SPECIFIC GRAVITY, POC UA: 1.02
UROBILINOGEN, POC UA: 0.2

## 2021-05-06 PROCEDURE — 99214 OFFICE O/P EST MOD 30 MIN: CPT | Mod: PBBFAC,PN | Performed by: UROLOGY

## 2021-05-06 PROCEDURE — 84153 ASSAY OF PSA TOTAL: CPT | Performed by: UROLOGY

## 2021-05-06 PROCEDURE — 99999 PR PBB SHADOW E&M-EST. PATIENT-LVL IV: ICD-10-PCS | Mod: PBBFAC,,, | Performed by: UROLOGY

## 2021-05-06 PROCEDURE — 51798 US URINE CAPACITY MEASURE: CPT | Mod: PBBFAC,PN | Performed by: UROLOGY

## 2021-05-06 PROCEDURE — 99999 PR PBB SHADOW E&M-EST. PATIENT-LVL IV: CPT | Mod: PBBFAC,,, | Performed by: UROLOGY

## 2021-05-06 PROCEDURE — 97110 THERAPEUTIC EXERCISES: CPT | Mod: PN,CQ

## 2021-05-06 PROCEDURE — 99214 OFFICE O/P EST MOD 30 MIN: CPT | Mod: S$PBB,,, | Performed by: UROLOGY

## 2021-05-06 PROCEDURE — 99214 PR OFFICE/OUTPT VISIT, EST, LEVL IV, 30-39 MIN: ICD-10-PCS | Mod: S$PBB,,, | Performed by: UROLOGY

## 2021-05-06 PROCEDURE — 36415 COLL VENOUS BLD VENIPUNCTURE: CPT | Performed by: UROLOGY

## 2021-05-06 PROCEDURE — 81002 URINALYSIS NONAUTO W/O SCOPE: CPT | Mod: PBBFAC,PN | Performed by: UROLOGY

## 2021-05-06 RX ORDER — FESOTERODINE FUMARATE 8 MG/1
8 TABLET, FILM COATED, EXTENDED RELEASE ORAL DAILY
Qty: 90 TABLET | Refills: 3 | Status: SHIPPED | OUTPATIENT
Start: 2021-05-06 | End: 2021-06-02

## 2021-05-07 ENCOUNTER — TELEPHONE (OUTPATIENT)
Dept: UROLOGY | Facility: CLINIC | Age: 76
End: 2021-05-07

## 2021-05-11 ENCOUNTER — TELEPHONE (OUTPATIENT)
Dept: FAMILY MEDICINE | Facility: CLINIC | Age: 76
End: 2021-05-11

## 2021-05-11 ENCOUNTER — OFFICE VISIT (OUTPATIENT)
Dept: FAMILY MEDICINE | Facility: CLINIC | Age: 76
End: 2021-05-11
Payer: MEDICARE

## 2021-05-11 ENCOUNTER — HOSPITAL ENCOUNTER (OUTPATIENT)
Dept: RADIOLOGY | Facility: HOSPITAL | Age: 76
Discharge: HOME | End: 2021-05-11
Attending: FAMILY MEDICINE
Payer: MEDICARE

## 2021-05-11 VITALS
DIASTOLIC BLOOD PRESSURE: 60 MMHG | OXYGEN SATURATION: 95 % | WEIGHT: 171.88 LBS | SYSTOLIC BLOOD PRESSURE: 96 MMHG | RESPIRATION RATE: 17 BRPM | TEMPERATURE: 99 F | HEIGHT: 63 IN | HEART RATE: 82 BPM | BODY MASS INDEX: 30.45 KG/M2

## 2021-05-11 DIAGNOSIS — Z11.59 ENCOUNTER FOR HEPATITIS C SCREENING TEST FOR LOW RISK PATIENT: ICD-10-CM

## 2021-05-11 DIAGNOSIS — E78.49 OTHER HYPERLIPIDEMIA: ICD-10-CM

## 2021-05-11 DIAGNOSIS — S22.42XA CLOSED FRACTURE OF MULTIPLE RIBS OF LEFT SIDE, INITIAL ENCOUNTER: Primary | ICD-10-CM

## 2021-05-11 DIAGNOSIS — I10 HYPERTENSION, UNSPECIFIED TYPE: ICD-10-CM

## 2021-05-11 DIAGNOSIS — R07.81 RIB PAIN: ICD-10-CM

## 2021-05-11 DIAGNOSIS — E11.8 TYPE 2 DIABETES MELLITUS WITH COMPLICATION: ICD-10-CM

## 2021-05-11 PROCEDURE — 99215 OFFICE O/P EST HI 40 MIN: CPT | Mod: 25 | Performed by: FAMILY MEDICINE

## 2021-05-11 PROCEDURE — 99214 OFFICE O/P EST MOD 30 MIN: CPT | Mod: S$PBB,,, | Performed by: FAMILY MEDICINE

## 2021-05-11 PROCEDURE — 99214 PR OFFICE/OUTPT VISIT, EST, LEVL IV, 30-39 MIN: ICD-10-PCS | Mod: S$PBB,,, | Performed by: FAMILY MEDICINE

## 2021-05-11 PROCEDURE — 71100 X-RAY EXAM RIBS UNI 2 VIEWS: CPT | Mod: TC,PO,LT

## 2021-05-11 RX ORDER — MIRABEGRON 50 MG/1
TABLET, FILM COATED, EXTENDED RELEASE ORAL
Qty: 30 TABLET | Refills: 11 | COMMUNITY
Start: 2021-05-11 | End: 2022-01-01 | Stop reason: SDUPTHER

## 2021-05-11 RX ORDER — IBUPROFEN 600 MG/1
600 TABLET ORAL EVERY 6 HOURS PRN
Qty: 30 TABLET | Refills: 0 | Status: SHIPPED | OUTPATIENT
Start: 2021-05-11 | End: 2022-01-01

## 2021-05-11 RX ORDER — BLOOD SUGAR DIAGNOSTIC
STRIP MISCELLANEOUS
COMMUNITY
Start: 2021-03-27

## 2021-05-12 ENCOUNTER — TELEPHONE (OUTPATIENT)
Dept: UROLOGY | Facility: CLINIC | Age: 76
End: 2021-05-12

## 2021-05-18 ENCOUNTER — TELEPHONE (OUTPATIENT)
Dept: FAMILY MEDICINE | Facility: CLINIC | Age: 76
End: 2021-05-18

## 2021-05-20 ENCOUNTER — TELEPHONE (OUTPATIENT)
Dept: REHABILITATION | Facility: HOSPITAL | Age: 76
End: 2021-05-20

## 2021-05-20 ENCOUNTER — TELEPHONE (OUTPATIENT)
Dept: UROLOGY | Facility: CLINIC | Age: 76
End: 2021-05-20

## 2021-05-21 ENCOUNTER — LAB VISIT (OUTPATIENT)
Dept: LAB | Facility: HOSPITAL | Age: 76
End: 2021-05-21
Attending: INTERNAL MEDICINE
Payer: MEDICARE

## 2021-05-21 ENCOUNTER — TELEPHONE (OUTPATIENT)
Dept: HEMATOLOGY/ONCOLOGY | Facility: CLINIC | Age: 76
End: 2021-05-21

## 2021-05-21 DIAGNOSIS — Z85.038 HISTORY OF COLON CANCER: ICD-10-CM

## 2021-05-21 DIAGNOSIS — D50.8 OTHER IRON DEFICIENCY ANEMIA: ICD-10-CM

## 2021-05-21 DIAGNOSIS — E53.8 B12 DEFICIENCY: ICD-10-CM

## 2021-05-21 DIAGNOSIS — Z85.46 HISTORY OF PROSTATE CANCER: ICD-10-CM

## 2021-05-21 DIAGNOSIS — D63.8 ANEMIA, CHRONIC DISEASE: ICD-10-CM

## 2021-05-21 LAB
ALBUMIN SERPL BCP-MCNC: 3.9 G/DL (ref 3.5–5.2)
ALP SERPL-CCNC: 92 U/L (ref 55–135)
ALT SERPL W/O P-5'-P-CCNC: 27 U/L (ref 10–44)
ANION GAP SERPL CALC-SCNC: 14 MMOL/L (ref 8–16)
AST SERPL-CCNC: 27 U/L (ref 10–40)
BASOPHILS # BLD AUTO: ABNORMAL K/UL (ref 0–0.2)
BASOPHILS NFR BLD: 0 % (ref 0–1.9)
BILIRUB SERPL-MCNC: 0.6 MG/DL (ref 0.1–1)
BUN SERPL-MCNC: 19 MG/DL (ref 8–23)
CALCIUM SERPL-MCNC: 9 MG/DL (ref 8.7–10.5)
CEA SERPL-MCNC: 3.4 NG/ML (ref 0–5)
CHLORIDE SERPL-SCNC: 94 MMOL/L (ref 95–110)
CO2 SERPL-SCNC: 26 MMOL/L (ref 23–29)
CREAT SERPL-MCNC: 1.5 MG/DL (ref 0.5–1.4)
DIFFERENTIAL METHOD: ABNORMAL
EOSINOPHIL # BLD AUTO: ABNORMAL K/UL (ref 0–0.5)
EOSINOPHIL NFR BLD: 3 % (ref 0–8)
ERYTHROCYTE [DISTWIDTH] IN BLOOD BY AUTOMATED COUNT: 12.2 % (ref 11.5–14.5)
EST. GFR  (AFRICAN AMERICAN): 52.3 ML/MIN/1.73 M^2
EST. GFR  (NON AFRICAN AMERICAN): 45.2 ML/MIN/1.73 M^2
FERRITIN SERPL-MCNC: 82 NG/ML (ref 20–300)
FOLATE SERPL-MCNC: >24.8 NG/ML (ref 4–24)
GLUCOSE SERPL-MCNC: 241 MG/DL (ref 70–110)
HCT VFR BLD AUTO: 32.8 % (ref 40–54)
HGB BLD-MCNC: 11 G/DL (ref 14–18)
IMM GRANULOCYTES # BLD AUTO: ABNORMAL K/UL (ref 0–0.04)
IMM GRANULOCYTES NFR BLD AUTO: ABNORMAL % (ref 0–0.5)
IRON SERPL-MCNC: 67 UG/DL (ref 45–160)
LYMPHOCYTES # BLD AUTO: ABNORMAL K/UL (ref 1–4.8)
LYMPHOCYTES NFR BLD: 33 % (ref 18–48)
MCH RBC QN AUTO: 30.8 PG (ref 27–31)
MCHC RBC AUTO-ENTMCNC: 33.5 G/DL (ref 32–36)
MCV RBC AUTO: 92 FL (ref 82–98)
MONOCYTES # BLD AUTO: ABNORMAL K/UL (ref 0.3–1)
MONOCYTES NFR BLD: 5 % (ref 4–15)
NEUTROPHILS # BLD AUTO: ABNORMAL K/UL (ref 1.8–7.7)
NEUTROPHILS NFR BLD: 59 % (ref 38–73)
NRBC BLD-RTO: 0 /100 WBC
PLATELET # BLD AUTO: 264 K/UL (ref 150–450)
PLATELET BLD QL SMEAR: ABNORMAL
PMV BLD AUTO: 9.4 FL (ref 9.2–12.9)
POTASSIUM SERPL-SCNC: 4.3 MMOL/L (ref 3.5–5.1)
PROT SERPL-MCNC: 6.6 G/DL (ref 6–8.4)
RBC # BLD AUTO: 3.57 M/UL (ref 4.6–6.2)
SATURATED IRON: 23 % (ref 20–50)
SODIUM SERPL-SCNC: 134 MMOL/L (ref 136–145)
TOTAL IRON BINDING CAPACITY: 288 UG/DL (ref 250–450)
TRANSFERRIN SERPL-MCNC: 206 MG/DL (ref 200–375)
VIT B12 SERPL-MCNC: 309 PG/ML (ref 210–950)
WBC # BLD AUTO: 5.61 K/UL (ref 3.9–12.7)

## 2021-05-21 PROCEDURE — 85007 BL SMEAR W/DIFF WBC COUNT: CPT | Performed by: INTERNAL MEDICINE

## 2021-05-21 PROCEDURE — 82746 ASSAY OF FOLIC ACID SERUM: CPT | Performed by: INTERNAL MEDICINE

## 2021-05-21 PROCEDURE — 82378 CARCINOEMBRYONIC ANTIGEN: CPT | Performed by: INTERNAL MEDICINE

## 2021-05-21 PROCEDURE — 36415 COLL VENOUS BLD VENIPUNCTURE: CPT | Performed by: INTERNAL MEDICINE

## 2021-05-21 PROCEDURE — 82728 ASSAY OF FERRITIN: CPT | Performed by: INTERNAL MEDICINE

## 2021-05-21 PROCEDURE — 85027 COMPLETE CBC AUTOMATED: CPT | Performed by: INTERNAL MEDICINE

## 2021-05-21 PROCEDURE — 82607 VITAMIN B-12: CPT | Performed by: INTERNAL MEDICINE

## 2021-05-21 PROCEDURE — 80053 COMPREHEN METABOLIC PANEL: CPT | Performed by: INTERNAL MEDICINE

## 2021-05-21 PROCEDURE — 83540 ASSAY OF IRON: CPT | Performed by: INTERNAL MEDICINE

## 2021-05-21 RX ORDER — SEMAGLUTIDE 1.34 MG/ML
0.25 INJECTION, SOLUTION SUBCUTANEOUS
Qty: 1 PEN | Refills: 1 | Status: SHIPPED | OUTPATIENT
Start: 2021-05-21 | End: 2021-06-04

## 2021-05-24 ENCOUNTER — OFFICE VISIT (OUTPATIENT)
Dept: HEMATOLOGY/ONCOLOGY | Facility: CLINIC | Age: 76
End: 2021-05-24
Payer: MEDICARE

## 2021-05-24 VITALS
HEART RATE: 94 BPM | HEIGHT: 63 IN | BODY MASS INDEX: 32.07 KG/M2 | DIASTOLIC BLOOD PRESSURE: 87 MMHG | SYSTOLIC BLOOD PRESSURE: 135 MMHG | WEIGHT: 181 LBS

## 2021-05-24 DIAGNOSIS — D50.8 OTHER IRON DEFICIENCY ANEMIA: ICD-10-CM

## 2021-05-24 DIAGNOSIS — Z85.038 HISTORY OF COLON CANCER: Primary | ICD-10-CM

## 2021-05-24 DIAGNOSIS — E53.8 B12 DEFICIENCY: ICD-10-CM

## 2021-05-24 DIAGNOSIS — Z85.46 HISTORY OF PROSTATE CANCER: ICD-10-CM

## 2021-05-24 DIAGNOSIS — R91.1 LUNG NODULE: ICD-10-CM

## 2021-05-24 DIAGNOSIS — D63.8 ANEMIA, CHRONIC DISEASE: ICD-10-CM

## 2021-05-24 PROCEDURE — 96372 THER/PROPH/DIAG INJ SC/IM: CPT | Mod: S$GLB,,, | Performed by: INTERNAL MEDICINE

## 2021-05-24 PROCEDURE — 99214 OFFICE O/P EST MOD 30 MIN: CPT | Mod: 25,S$GLB,, | Performed by: INTERNAL MEDICINE

## 2021-05-24 PROCEDURE — 96372 PR INJECTION,THERAP/PROPH/DIAG2ST, IM OR SUBCUT: ICD-10-PCS | Mod: S$GLB,,, | Performed by: INTERNAL MEDICINE

## 2021-05-24 PROCEDURE — 99214 PR OFFICE/OUTPT VISIT, EST, LEVL IV, 30-39 MIN: ICD-10-PCS | Mod: 25,S$GLB,, | Performed by: INTERNAL MEDICINE

## 2021-05-24 RX ORDER — CYANOCOBALAMIN 1000 UG/ML
1000 INJECTION, SOLUTION INTRAMUSCULAR; SUBCUTANEOUS
Status: SHIPPED | OUTPATIENT
Start: 2021-05-24 | End: 2022-01-01

## 2021-05-24 RX ADMIN — CYANOCOBALAMIN 1000 MCG: 1000 INJECTION, SOLUTION INTRAMUSCULAR; SUBCUTANEOUS at 10:05

## 2021-06-02 ENCOUNTER — OFFICE VISIT (OUTPATIENT)
Dept: PODIATRY | Facility: CLINIC | Age: 76
End: 2021-06-02
Payer: MEDICARE

## 2021-06-02 VITALS
SYSTOLIC BLOOD PRESSURE: 126 MMHG | HEIGHT: 63 IN | BODY MASS INDEX: 32.07 KG/M2 | RESPIRATION RATE: 16 BRPM | WEIGHT: 181 LBS | HEART RATE: 72 BPM | OXYGEN SATURATION: 98 % | DIASTOLIC BLOOD PRESSURE: 70 MMHG

## 2021-06-02 DIAGNOSIS — E11.49 TYPE 2 DIABETES MELLITUS WITH OTHER NEUROLOGIC COMPLICATION, WITHOUT LONG-TERM CURRENT USE OF INSULIN: ICD-10-CM

## 2021-06-02 DIAGNOSIS — E11.9 ENCOUNTER FOR DIABETIC FOOT EXAM: Primary | ICD-10-CM

## 2021-06-02 PROCEDURE — 99203 OFFICE O/P NEW LOW 30 MIN: CPT | Mod: S$GLB,,, | Performed by: PODIATRIST

## 2021-06-02 PROCEDURE — 99203 PR OFFICE/OUTPT VISIT, NEW, LEVL III, 30-44 MIN: ICD-10-PCS | Mod: S$GLB,,, | Performed by: PODIATRIST

## 2021-06-08 ENCOUNTER — OFFICE VISIT (OUTPATIENT)
Dept: OPHTHALMOLOGY | Facility: CLINIC | Age: 76
End: 2021-06-08
Payer: MEDICARE

## 2021-06-08 ENCOUNTER — LAB VISIT (OUTPATIENT)
Dept: LAB | Facility: HOSPITAL | Age: 76
End: 2021-06-08
Attending: FAMILY MEDICINE
Payer: MEDICARE

## 2021-06-08 DIAGNOSIS — H25.13 NUCLEAR SCLEROTIC CATARACT, BILATERAL: ICD-10-CM

## 2021-06-08 DIAGNOSIS — H43.813 POSTERIOR VITREOUS DETACHMENT OF BOTH EYES: ICD-10-CM

## 2021-06-08 DIAGNOSIS — E11.9 TYPE 2 DIABETES MELLITUS WITHOUT RETINOPATHY: Primary | ICD-10-CM

## 2021-06-08 DIAGNOSIS — H04.123 DRY EYE SYNDROME, BILATERAL: ICD-10-CM

## 2021-06-08 DIAGNOSIS — E11.8 TYPE 2 DIABETES MELLITUS WITH COMPLICATION: ICD-10-CM

## 2021-06-08 LAB
ALBUMIN/CREAT UR: 10.8 UG/MG (ref 0–30)
CREAT UR-MCNC: 219 MG/DL (ref 23–375)
MICROALBUMIN UR DL<=1MG/L-MCNC: 23.7 UG/ML

## 2021-06-08 PROCEDURE — 82570 ASSAY OF URINE CREATININE: CPT | Performed by: FAMILY MEDICINE

## 2021-06-08 PROCEDURE — 82043 UR ALBUMIN QUANTITATIVE: CPT | Performed by: FAMILY MEDICINE

## 2021-06-08 PROCEDURE — 99213 OFFICE O/P EST LOW 20 MIN: CPT | Mod: PBBFAC,PO | Performed by: OPHTHALMOLOGY

## 2021-06-08 PROCEDURE — 92004 PR EYE EXAM, NEW PATIENT,COMPREHESV: ICD-10-PCS | Mod: S$PBB,,, | Performed by: OPHTHALMOLOGY

## 2021-06-08 PROCEDURE — 99999 PR PBB SHADOW E&M-EST. PATIENT-LVL III: ICD-10-PCS | Mod: PBBFAC,,, | Performed by: OPHTHALMOLOGY

## 2021-06-08 PROCEDURE — 99999 PR PBB SHADOW E&M-EST. PATIENT-LVL III: CPT | Mod: PBBFAC,,, | Performed by: OPHTHALMOLOGY

## 2021-06-08 PROCEDURE — 92004 COMPRE OPH EXAM NEW PT 1/>: CPT | Mod: S$PBB,,, | Performed by: OPHTHALMOLOGY

## 2021-06-15 ENCOUNTER — OFFICE VISIT (OUTPATIENT)
Dept: FAMILY MEDICINE | Facility: CLINIC | Age: 76
End: 2021-06-15
Payer: MEDICARE

## 2021-06-15 VITALS
HEART RATE: 96 BPM | SYSTOLIC BLOOD PRESSURE: 124 MMHG | DIASTOLIC BLOOD PRESSURE: 78 MMHG | WEIGHT: 178 LBS | OXYGEN SATURATION: 96 % | BODY MASS INDEX: 31.54 KG/M2 | HEIGHT: 63 IN | TEMPERATURE: 99 F | RESPIRATION RATE: 19 BRPM

## 2021-06-15 DIAGNOSIS — E66.9 TYPE 2 DIABETES MELLITUS WITH OBESITY: ICD-10-CM

## 2021-06-15 DIAGNOSIS — E03.8 OTHER SPECIFIED HYPOTHYROIDISM: ICD-10-CM

## 2021-06-15 DIAGNOSIS — E78.49 OTHER HYPERLIPIDEMIA: ICD-10-CM

## 2021-06-15 DIAGNOSIS — E11.8 TYPE 2 DIABETES MELLITUS WITH COMPLICATION: Primary | ICD-10-CM

## 2021-06-15 DIAGNOSIS — I10 HYPERTENSION, UNSPECIFIED TYPE: ICD-10-CM

## 2021-06-15 DIAGNOSIS — I89.0 LYMPHEDEMA: ICD-10-CM

## 2021-06-15 DIAGNOSIS — E11.69 TYPE 2 DIABETES MELLITUS WITH OBESITY: ICD-10-CM

## 2021-06-15 PROCEDURE — 99215 OFFICE O/P EST HI 40 MIN: CPT | Performed by: FAMILY MEDICINE

## 2021-06-15 PROCEDURE — 99214 PR OFFICE/OUTPT VISIT, EST, LEVL IV, 30-39 MIN: ICD-10-PCS | Mod: S$PBB,,, | Performed by: FAMILY MEDICINE

## 2021-06-15 PROCEDURE — 99214 OFFICE O/P EST MOD 30 MIN: CPT | Mod: S$PBB,,, | Performed by: FAMILY MEDICINE

## 2021-06-15 RX ORDER — ATORVASTATIN CALCIUM 40 MG/1
40 TABLET, FILM COATED ORAL DAILY
Qty: 90 TABLET | Refills: 3 | Status: SHIPPED | OUTPATIENT
Start: 2021-06-15 | End: 2022-01-01

## 2021-06-15 RX ORDER — METFORMIN HYDROCHLORIDE 750 MG/1
750 TABLET, EXTENDED RELEASE ORAL
Qty: 30 TABLET | Refills: 11 | Status: SHIPPED | OUTPATIENT
Start: 2021-06-15 | End: 2022-01-01

## 2021-06-15 RX ORDER — SEMAGLUTIDE 1.34 MG/ML
1 INJECTION, SOLUTION SUBCUTANEOUS
Qty: 2 PEN | Refills: 11 | Status: SHIPPED | OUTPATIENT
Start: 2021-06-15 | End: 2021-09-16

## 2021-06-22 ENCOUNTER — CLINICAL SUPPORT (OUTPATIENT)
Dept: REHABILITATION | Facility: HOSPITAL | Age: 76
End: 2021-06-22
Attending: FAMILY MEDICINE
Payer: MEDICARE

## 2021-06-22 DIAGNOSIS — I89.0 LYMPHEDEMA: Primary | ICD-10-CM

## 2021-06-22 PROCEDURE — 97140 MANUAL THERAPY 1/> REGIONS: CPT

## 2021-06-22 PROCEDURE — 97165 OT EVAL LOW COMPLEX 30 MIN: CPT

## 2021-06-24 ENCOUNTER — HOSPITAL ENCOUNTER (OUTPATIENT)
Dept: RADIOLOGY | Facility: HOSPITAL | Age: 76
Discharge: HOME | End: 2021-06-24
Attending: INTERNAL MEDICINE
Payer: MEDICARE

## 2021-06-24 ENCOUNTER — TELEPHONE (OUTPATIENT)
Dept: HEMATOLOGY/ONCOLOGY | Facility: CLINIC | Age: 76
End: 2021-06-24

## 2021-06-24 DIAGNOSIS — R91.1 LUNG NODULE: ICD-10-CM

## 2021-06-24 DIAGNOSIS — Z85.038 HISTORY OF COLON CANCER: ICD-10-CM

## 2021-06-24 DIAGNOSIS — Z85.46 HISTORY OF PROSTATE CANCER: ICD-10-CM

## 2021-06-24 LAB
CREAT SERPL-MCNC: 1.4 MG/DL (ref 0.5–1.4)
SAMPLE: NORMAL

## 2021-06-24 PROCEDURE — 74177 CT ABD & PELVIS W/CONTRAST: CPT | Mod: TC,PO

## 2021-06-24 PROCEDURE — 71260 CT THORAX DX C+: CPT | Mod: TC,PO

## 2021-06-24 PROCEDURE — 25500020 PHARM REV CODE 255: Mod: PO | Performed by: INTERNAL MEDICINE

## 2021-06-24 RX ADMIN — IOHEXOL 100 ML: 350 INJECTION, SOLUTION INTRAVENOUS at 11:06

## 2021-06-28 ENCOUNTER — CLINICAL SUPPORT (OUTPATIENT)
Dept: HEMATOLOGY/ONCOLOGY | Facility: CLINIC | Age: 76
End: 2021-06-28
Payer: MEDICARE

## 2021-06-28 DIAGNOSIS — E53.8 B12 DEFICIENCY: ICD-10-CM

## 2021-06-28 PROCEDURE — 96372 THER/PROPH/DIAG INJ SC/IM: CPT | Mod: S$GLB,,, | Performed by: INTERNAL MEDICINE

## 2021-06-28 PROCEDURE — 96372 PR INJECTION,THERAP/PROPH/DIAG2ST, IM OR SUBCUT: ICD-10-PCS | Mod: S$GLB,,, | Performed by: INTERNAL MEDICINE

## 2021-06-28 RX ADMIN — CYANOCOBALAMIN 1000 MCG: 1000 INJECTION, SOLUTION INTRAMUSCULAR; SUBCUTANEOUS at 10:06

## 2021-07-12 ENCOUNTER — CLINICAL SUPPORT (OUTPATIENT)
Dept: REHABILITATION | Facility: HOSPITAL | Age: 76
End: 2021-07-12
Payer: MEDICARE

## 2021-07-12 DIAGNOSIS — I89.0 LYMPHEDEMA OF BOTH LOWER EXTREMITIES: Primary | ICD-10-CM

## 2021-07-12 PROCEDURE — 97140 MANUAL THERAPY 1/> REGIONS: CPT

## 2021-07-15 ENCOUNTER — CLINICAL SUPPORT (OUTPATIENT)
Dept: REHABILITATION | Facility: HOSPITAL | Age: 76
End: 2021-07-15
Payer: MEDICARE

## 2021-07-15 DIAGNOSIS — I89.0 LYMPHEDEMA OF BOTH LOWER EXTREMITIES: Primary | ICD-10-CM

## 2021-07-15 PROCEDURE — 97140 MANUAL THERAPY 1/> REGIONS: CPT

## 2021-07-19 ENCOUNTER — CLINICAL SUPPORT (OUTPATIENT)
Dept: REHABILITATION | Facility: HOSPITAL | Age: 76
End: 2021-07-19
Payer: MEDICARE

## 2021-07-19 DIAGNOSIS — I89.0 LYMPHEDEMA OF BOTH LOWER EXTREMITIES: Primary | ICD-10-CM

## 2021-07-19 PROCEDURE — 97140 MANUAL THERAPY 1/> REGIONS: CPT

## 2021-07-22 ENCOUNTER — CLINICAL SUPPORT (OUTPATIENT)
Dept: REHABILITATION | Facility: HOSPITAL | Age: 76
End: 2021-07-22
Payer: MEDICARE

## 2021-07-22 DIAGNOSIS — I89.0 LYMPHEDEMA OF BOTH LOWER EXTREMITIES: Primary | ICD-10-CM

## 2021-07-22 PROCEDURE — 97140 MANUAL THERAPY 1/> REGIONS: CPT

## 2021-07-27 ENCOUNTER — TELEPHONE (OUTPATIENT)
Dept: FAMILY MEDICINE | Facility: CLINIC | Age: 76
End: 2021-07-27

## 2021-07-29 ENCOUNTER — CLINICAL SUPPORT (OUTPATIENT)
Dept: HEMATOLOGY/ONCOLOGY | Facility: CLINIC | Age: 76
End: 2021-07-29
Payer: MEDICARE

## 2021-07-29 ENCOUNTER — CLINICAL SUPPORT (OUTPATIENT)
Dept: REHABILITATION | Facility: HOSPITAL | Age: 76
End: 2021-07-29
Payer: MEDICARE

## 2021-07-29 DIAGNOSIS — I89.0 LYMPHEDEMA OF BOTH LOWER EXTREMITIES: Primary | ICD-10-CM

## 2021-07-29 DIAGNOSIS — E53.8 B12 DEFICIENCY: ICD-10-CM

## 2021-07-29 PROCEDURE — 97140 MANUAL THERAPY 1/> REGIONS: CPT

## 2021-07-29 PROCEDURE — 96372 PR INJECTION,THERAP/PROPH/DIAG2ST, IM OR SUBCUT: ICD-10-PCS | Mod: S$GLB,,, | Performed by: INTERNAL MEDICINE

## 2021-07-29 PROCEDURE — 96372 THER/PROPH/DIAG INJ SC/IM: CPT | Mod: S$GLB,,, | Performed by: INTERNAL MEDICINE

## 2021-07-29 RX ADMIN — CYANOCOBALAMIN 1000 MCG: 1000 INJECTION, SOLUTION INTRAMUSCULAR; SUBCUTANEOUS at 12:07

## 2021-08-12 ENCOUNTER — HOSPITAL ENCOUNTER (EMERGENCY)
Facility: HOSPITAL | Age: 76
Discharge: HOME | End: 2021-08-12
Attending: EMERGENCY MEDICINE
Payer: MEDICARE

## 2021-08-12 VITALS
SYSTOLIC BLOOD PRESSURE: 109 MMHG | TEMPERATURE: 98 F | RESPIRATION RATE: 16 BRPM | WEIGHT: 165 LBS | HEART RATE: 92 BPM | DIASTOLIC BLOOD PRESSURE: 61 MMHG | OXYGEN SATURATION: 97 % | HEIGHT: 64 IN | BODY MASS INDEX: 28.17 KG/M2
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
## 2021-08-12 PROCEDURE — 99283 EMERGENCY DEPT VISIT LOW MDM: CPT

## 2021-09-16 ENCOUNTER — OFFICE VISIT (OUTPATIENT)
Dept: FAMILY MEDICINE | Facility: CLINIC | Age: 76
End: 2021-09-16
Payer: MEDICARE

## 2021-09-16 VITALS
HEART RATE: 88 BPM | TEMPERATURE: 98 F | OXYGEN SATURATION: 98 % | HEIGHT: 64 IN | BODY MASS INDEX: 28.82 KG/M2 | SYSTOLIC BLOOD PRESSURE: 110 MMHG | WEIGHT: 168.81 LBS | DIASTOLIC BLOOD PRESSURE: 62 MMHG

## 2021-09-16 DIAGNOSIS — I10 HYPERTENSION, UNSPECIFIED TYPE: ICD-10-CM

## 2021-09-16 DIAGNOSIS — E11.69 TYPE 2 DIABETES MELLITUS WITH OBESITY: ICD-10-CM

## 2021-09-16 DIAGNOSIS — Z23 NEED FOR VACCINATION AGAINST STREPTOCOCCUS PNEUMONIAE: Primary | ICD-10-CM

## 2021-09-16 DIAGNOSIS — E66.9 TYPE 2 DIABETES MELLITUS WITH OBESITY: ICD-10-CM

## 2021-09-16 DIAGNOSIS — I87.2 CHRONIC VENOUS INSUFFICIENCY: ICD-10-CM

## 2021-09-16 DIAGNOSIS — E78.49 OTHER HYPERLIPIDEMIA: ICD-10-CM

## 2021-09-16 DIAGNOSIS — Z23 NEEDS FLU SHOT: ICD-10-CM

## 2021-09-16 PROCEDURE — G0009 ADMIN PNEUMOCOCCAL VACCINE: HCPCS | Mod: PBBFAC | Performed by: FAMILY MEDICINE

## 2021-09-16 PROCEDURE — 99214 OFFICE O/P EST MOD 30 MIN: CPT | Mod: S$PBB,,, | Performed by: FAMILY MEDICINE

## 2021-09-16 PROCEDURE — 99214 PR OFFICE/OUTPT VISIT, EST, LEVL IV, 30-39 MIN: ICD-10-PCS | Mod: S$PBB,,, | Performed by: FAMILY MEDICINE

## 2021-09-16 PROCEDURE — 90732 PPSV23 VACC 2 YRS+ SUBQ/IM: CPT | Mod: PBBFAC | Performed by: FAMILY MEDICINE

## 2021-09-16 PROCEDURE — G0008 ADMIN INFLUENZA VIRUS VAC: HCPCS | Mod: PBBFAC | Performed by: FAMILY MEDICINE

## 2021-09-16 PROCEDURE — 99215 OFFICE O/P EST HI 40 MIN: CPT | Performed by: FAMILY MEDICINE

## 2021-09-16 RX ORDER — SEMAGLUTIDE 1.34 MG/ML
INJECTION, SOLUTION SUBCUTANEOUS
COMMUNITY
Start: 2021-09-03 | End: 2022-01-01 | Stop reason: SDUPTHER

## 2021-09-16 RX ORDER — ASPIRIN 81 MG/1
81 TABLET ORAL DAILY
Qty: 90 TABLET | Refills: 0 | Status: SHIPPED | OUTPATIENT
Start: 2021-09-16 | End: 2022-01-01

## 2021-09-28 ENCOUNTER — CLINICAL SUPPORT (OUTPATIENT)
Dept: HEMATOLOGY/ONCOLOGY | Facility: CLINIC | Age: 76
End: 2021-09-28
Payer: MEDICARE

## 2021-09-28 DIAGNOSIS — D63.8 ANEMIA, CHRONIC DISEASE: ICD-10-CM

## 2021-09-28 PROCEDURE — 96372 THER/PROPH/DIAG INJ SC/IM: CPT | Mod: S$GLB,,, | Performed by: INTERNAL MEDICINE

## 2021-09-28 PROCEDURE — 96372 PR INJECTION,THERAP/PROPH/DIAG2ST, IM OR SUBCUT: ICD-10-PCS | Mod: S$GLB,,, | Performed by: INTERNAL MEDICINE

## 2021-09-28 RX ADMIN — CYANOCOBALAMIN 1000 MCG: 1000 INJECTION, SOLUTION INTRAMUSCULAR; SUBCUTANEOUS at 11:09

## 2021-10-25 ENCOUNTER — PATIENT MESSAGE (OUTPATIENT)
Dept: FAMILY MEDICINE | Facility: CLINIC | Age: 76
End: 2021-10-25
Payer: MEDICARE

## 2021-10-25 ENCOUNTER — CLINICAL SUPPORT (OUTPATIENT)
Dept: HEMATOLOGY/ONCOLOGY | Facility: CLINIC | Age: 76
End: 2021-10-25
Payer: MEDICARE

## 2021-10-25 DIAGNOSIS — E53.8 B12 DEFICIENCY: ICD-10-CM

## 2021-10-25 PROCEDURE — 96372 PR INJECTION,THERAP/PROPH/DIAG2ST, IM OR SUBCUT: ICD-10-PCS | Mod: S$GLB,,, | Performed by: INTERNAL MEDICINE

## 2021-10-25 PROCEDURE — 96372 THER/PROPH/DIAG INJ SC/IM: CPT | Mod: S$GLB,,, | Performed by: INTERNAL MEDICINE

## 2021-10-25 RX ADMIN — CYANOCOBALAMIN 1000 MCG: 1000 INJECTION, SOLUTION INTRAMUSCULAR; SUBCUTANEOUS at 10:10

## 2021-10-27 RX ORDER — LEVOTHYROXINE SODIUM 75 UG/1
75 TABLET ORAL
Qty: 90 TABLET | Refills: 0 | Status: SHIPPED | OUTPATIENT
Start: 2021-10-27 | End: 2021-01-01 | Stop reason: SDUPTHER

## 2021-10-28 ENCOUNTER — PATIENT MESSAGE (OUTPATIENT)
Dept: FAMILY MEDICINE | Facility: CLINIC | Age: 76
End: 2021-10-28
Payer: MEDICARE

## 2022-01-01 ENCOUNTER — TELEPHONE (OUTPATIENT)
Dept: NEUROLOGY | Facility: CLINIC | Age: 77
End: 2022-01-01
Payer: MEDICARE

## 2022-01-01 ENCOUNTER — OFFICE VISIT (OUTPATIENT)
Dept: NEUROLOGY | Facility: CLINIC | Age: 77
End: 2022-01-01
Payer: MEDICARE

## 2022-01-01 ENCOUNTER — INFUSION (OUTPATIENT)
Dept: INFUSION THERAPY | Facility: HOSPITAL | Age: 77
End: 2022-01-01
Attending: INTERNAL MEDICINE
Payer: MEDICARE

## 2022-01-01 ENCOUNTER — OFFICE VISIT (OUTPATIENT)
Dept: HEMATOLOGY/ONCOLOGY | Facility: CLINIC | Age: 77
End: 2022-01-01
Payer: MEDICARE

## 2022-01-01 ENCOUNTER — LAB VISIT (OUTPATIENT)
Dept: LAB | Facility: HOSPITAL | Age: 77
End: 2022-01-01
Attending: INTERNAL MEDICINE
Payer: MEDICARE

## 2022-01-01 ENCOUNTER — OFFICE VISIT (OUTPATIENT)
Dept: PSYCHIATRY | Facility: CLINIC | Age: 77
End: 2022-01-01
Payer: MEDICARE

## 2022-01-01 ENCOUNTER — ANESTHESIA EVENT (OUTPATIENT)
Dept: INTENSIVE CARE | Facility: HOSPITAL | Age: 77
DRG: 871 | End: 2022-01-01
Payer: MEDICARE

## 2022-01-01 ENCOUNTER — ANESTHESIA (OUTPATIENT)
Dept: INTENSIVE CARE | Facility: HOSPITAL | Age: 77
DRG: 871 | End: 2022-01-01
Payer: MEDICARE

## 2022-01-01 ENCOUNTER — PATIENT MESSAGE (OUTPATIENT)
Dept: NEUROLOGY | Facility: CLINIC | Age: 77
End: 2022-01-01
Payer: MEDICARE

## 2022-01-01 ENCOUNTER — TELEPHONE (OUTPATIENT)
Dept: FAMILY MEDICINE | Facility: CLINIC | Age: 77
End: 2022-01-01

## 2022-01-01 ENCOUNTER — TELEPHONE (OUTPATIENT)
Dept: INFUSION THERAPY | Facility: HOSPITAL | Age: 77
End: 2022-01-01

## 2022-01-01 ENCOUNTER — OFFICE VISIT (OUTPATIENT)
Dept: FAMILY MEDICINE | Facility: CLINIC | Age: 77
End: 2022-01-01
Payer: MEDICARE

## 2022-01-01 ENCOUNTER — HOSPITAL ENCOUNTER (OUTPATIENT)
Dept: RADIOLOGY | Facility: HOSPITAL | Age: 77
Discharge: HOME | End: 2022-02-10
Attending: NURSE PRACTITIONER
Payer: MEDICARE

## 2022-01-01 ENCOUNTER — LAB VISIT (OUTPATIENT)
Dept: LAB | Facility: HOSPITAL | Age: 77
End: 2022-01-01
Attending: FAMILY MEDICINE
Payer: MEDICARE

## 2022-01-01 ENCOUNTER — TELEPHONE (OUTPATIENT)
Dept: NEUROLOGY | Facility: CLINIC | Age: 77
End: 2022-01-01

## 2022-01-01 ENCOUNTER — LAB VISIT (OUTPATIENT)
Dept: LAB | Facility: HOSPITAL | Age: 77
End: 2022-01-01
Attending: NURSE PRACTITIONER
Payer: MEDICARE

## 2022-01-01 ENCOUNTER — PATIENT MESSAGE (OUTPATIENT)
Dept: FAMILY MEDICINE | Facility: CLINIC | Age: 77
End: 2022-01-01

## 2022-01-01 ENCOUNTER — HOSPITAL ENCOUNTER (INPATIENT)
Facility: HOSPITAL | Age: 77
LOS: 2 days | DRG: 871 | End: 2022-11-16
Attending: EMERGENCY MEDICINE | Admitting: HOSPITALIST
Payer: MEDICARE

## 2022-01-01 ENCOUNTER — CLINICAL SUPPORT (OUTPATIENT)
Dept: CARDIOLOGY | Facility: HOSPITAL | Age: 77
DRG: 871 | End: 2022-01-01
Attending: HOSPITALIST
Payer: MEDICARE

## 2022-01-01 VITALS
HEART RATE: 87 BPM | TEMPERATURE: 98 F | RESPIRATION RATE: 18 BRPM | SYSTOLIC BLOOD PRESSURE: 116 MMHG | BODY MASS INDEX: 28.1 KG/M2 | WEIGHT: 164.63 LBS | HEIGHT: 64 IN | DIASTOLIC BLOOD PRESSURE: 73 MMHG | OXYGEN SATURATION: 95 %

## 2022-01-01 VITALS
TEMPERATURE: 97 F | WEIGHT: 162.81 LBS | RESPIRATION RATE: 18 BRPM | DIASTOLIC BLOOD PRESSURE: 78 MMHG | BODY MASS INDEX: 27.94 KG/M2 | HEART RATE: 91 BPM | OXYGEN SATURATION: 97 % | SYSTOLIC BLOOD PRESSURE: 128 MMHG

## 2022-01-01 VITALS
HEIGHT: 64 IN | SYSTOLIC BLOOD PRESSURE: 134 MMHG | HEART RATE: 101 BPM | WEIGHT: 164.44 LBS | BODY MASS INDEX: 28.07 KG/M2 | DIASTOLIC BLOOD PRESSURE: 85 MMHG

## 2022-01-01 VITALS
HEART RATE: 91 BPM | DIASTOLIC BLOOD PRESSURE: 76 MMHG | HEIGHT: 64 IN | OXYGEN SATURATION: 97 % | WEIGHT: 162.88 LBS | BODY MASS INDEX: 27.81 KG/M2 | TEMPERATURE: 99 F | SYSTOLIC BLOOD PRESSURE: 120 MMHG

## 2022-01-01 VITALS
HEART RATE: 104 BPM | HEIGHT: 64 IN | WEIGHT: 167.13 LBS | SYSTOLIC BLOOD PRESSURE: 136 MMHG | DIASTOLIC BLOOD PRESSURE: 79 MMHG | BODY MASS INDEX: 28.53 KG/M2

## 2022-01-01 VITALS
RESPIRATION RATE: 18 BRPM | WEIGHT: 165.38 LBS | TEMPERATURE: 97 F | DIASTOLIC BLOOD PRESSURE: 87 MMHG | BODY MASS INDEX: 28.39 KG/M2 | HEART RATE: 97 BPM | RESPIRATION RATE: 18 BRPM | OXYGEN SATURATION: 96 % | DIASTOLIC BLOOD PRESSURE: 67 MMHG | WEIGHT: 163.38 LBS | SYSTOLIC BLOOD PRESSURE: 117 MMHG | OXYGEN SATURATION: 97 % | SYSTOLIC BLOOD PRESSURE: 132 MMHG | TEMPERATURE: 97 F | HEART RATE: 89 BPM | BODY MASS INDEX: 28.05 KG/M2

## 2022-01-01 VITALS
TEMPERATURE: 98 F | DIASTOLIC BLOOD PRESSURE: 81 MMHG | OXYGEN SATURATION: 95 % | WEIGHT: 165.5 LBS | SYSTOLIC BLOOD PRESSURE: 146 MMHG | RESPIRATION RATE: 18 BRPM | HEART RATE: 86 BPM | BODY MASS INDEX: 28.41 KG/M2

## 2022-01-01 VITALS
OXYGEN SATURATION: 97 % | SYSTOLIC BLOOD PRESSURE: 149 MMHG | DIASTOLIC BLOOD PRESSURE: 88 MMHG | RESPIRATION RATE: 18 BRPM | TEMPERATURE: 98 F | HEART RATE: 92 BPM | BODY MASS INDEX: 28.32 KG/M2 | WEIGHT: 165 LBS

## 2022-01-01 VITALS
SYSTOLIC BLOOD PRESSURE: 121 MMHG | HEIGHT: 64 IN | WEIGHT: 164.81 LBS | RESPIRATION RATE: 18 BRPM | TEMPERATURE: 98 F | BODY MASS INDEX: 28.14 KG/M2 | HEART RATE: 92 BPM | DIASTOLIC BLOOD PRESSURE: 82 MMHG

## 2022-01-01 VITALS
TEMPERATURE: 98 F | WEIGHT: 166.75 LBS | HEART RATE: 91 BPM | DIASTOLIC BLOOD PRESSURE: 92 MMHG | RESPIRATION RATE: 18 BRPM | SYSTOLIC BLOOD PRESSURE: 141 MMHG | BODY MASS INDEX: 28.63 KG/M2

## 2022-01-01 VITALS
BODY MASS INDEX: 28.85 KG/M2 | RESPIRATION RATE: 18 BRPM | WEIGHT: 169 LBS | TEMPERATURE: 98 F | HEIGHT: 64 IN | DIASTOLIC BLOOD PRESSURE: 82 MMHG | SYSTOLIC BLOOD PRESSURE: 123 MMHG | HEART RATE: 90 BPM

## 2022-01-01 VITALS
HEART RATE: 101 BPM | WEIGHT: 163.38 LBS | DIASTOLIC BLOOD PRESSURE: 84 MMHG | SYSTOLIC BLOOD PRESSURE: 127 MMHG | BODY MASS INDEX: 27.89 KG/M2 | TEMPERATURE: 97 F | RESPIRATION RATE: 17 BRPM | HEIGHT: 64 IN | OXYGEN SATURATION: 96 %

## 2022-01-01 VITALS
DIASTOLIC BLOOD PRESSURE: 62 MMHG | HEART RATE: 58 BPM | HEIGHT: 63 IN | OXYGEN SATURATION: 97 % | BODY MASS INDEX: 29.77 KG/M2 | TEMPERATURE: 99 F | WEIGHT: 168 LBS | SYSTOLIC BLOOD PRESSURE: 133 MMHG | RESPIRATION RATE: 120 BRPM

## 2022-01-01 VITALS
BODY MASS INDEX: 28.25 KG/M2 | DIASTOLIC BLOOD PRESSURE: 68 MMHG | OXYGEN SATURATION: 96 % | WEIGHT: 165.5 LBS | HEART RATE: 85 BPM | SYSTOLIC BLOOD PRESSURE: 112 MMHG | HEIGHT: 64 IN

## 2022-01-01 VITALS
HEART RATE: 101 BPM | SYSTOLIC BLOOD PRESSURE: 112 MMHG | BODY MASS INDEX: 28.98 KG/M2 | WEIGHT: 169.75 LBS | HEIGHT: 64 IN | DIASTOLIC BLOOD PRESSURE: 71 MMHG

## 2022-01-01 VITALS — WEIGHT: 168 LBS | HEIGHT: 63 IN | BODY MASS INDEX: 29.77 KG/M2

## 2022-01-01 DIAGNOSIS — J18.9 PNEUMONIA OF BOTH LOWER LOBES DUE TO INFECTIOUS ORGANISM: ICD-10-CM
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
## 2022-01-01 DIAGNOSIS — G89.29 CHRONIC BILATERAL LOW BACK PAIN WITHOUT SCIATICA: ICD-10-CM

## 2022-01-01 DIAGNOSIS — E53.8 B12 DEFICIENCY: ICD-10-CM

## 2022-01-01 DIAGNOSIS — Z86.59 HISTORY OF OCD (OBSESSIVE COMPULSIVE DISORDER): ICD-10-CM

## 2022-01-01 DIAGNOSIS — M54.42 CHRONIC MIDLINE LOW BACK PAIN WITH BILATERAL SCIATICA: ICD-10-CM

## 2022-01-01 DIAGNOSIS — F31.9 BIPOLAR AFFECTIVE DISORDER, REMISSION STATUS UNSPECIFIED: ICD-10-CM

## 2022-01-01 DIAGNOSIS — R41.3 MEMORY LOSS: ICD-10-CM

## 2022-01-01 DIAGNOSIS — E11.69 TYPE 2 DIABETES MELLITUS WITH OBESITY: Primary | ICD-10-CM

## 2022-01-01 DIAGNOSIS — E53.8 B12 DEFICIENCY: Primary | ICD-10-CM

## 2022-01-01 DIAGNOSIS — M54.41 CHRONIC MIDLINE LOW BACK PAIN WITH BILATERAL SCIATICA: ICD-10-CM

## 2022-01-01 DIAGNOSIS — D50.8 OTHER IRON DEFICIENCY ANEMIA: Primary | ICD-10-CM

## 2022-01-01 DIAGNOSIS — R07.9 CHEST PAIN: ICD-10-CM

## 2022-01-01 DIAGNOSIS — A41.9 SEPSIS WITH ACUTE RENAL FAILURE WITHOUT SEPTIC SHOCK, DUE TO UNSPECIFIED ORGANISM, UNSPECIFIED ACUTE RENAL FAILURE TYPE: Primary | ICD-10-CM

## 2022-01-01 DIAGNOSIS — F31.9 BIPOLAR AFFECTIVE DISORDER, REMISSION STATUS UNSPECIFIED: Primary | ICD-10-CM

## 2022-01-01 DIAGNOSIS — N17.9 SEPSIS WITH ACUTE RENAL FAILURE WITHOUT SEPTIC SHOCK, DUE TO UNSPECIFIED ORGANISM, UNSPECIFIED ACUTE RENAL FAILURE TYPE: Primary | ICD-10-CM

## 2022-01-01 DIAGNOSIS — Z85.038 HISTORY OF COLON CANCER: ICD-10-CM

## 2022-01-01 DIAGNOSIS — F03.90 MAJOR NEUROCOGNITIVE DISORDER: ICD-10-CM

## 2022-01-01 DIAGNOSIS — E11.69 TYPE 2 DIABETES MELLITUS WITH OBESITY: ICD-10-CM

## 2022-01-01 DIAGNOSIS — E78.49 OTHER HYPERLIPIDEMIA: ICD-10-CM

## 2022-01-01 DIAGNOSIS — R00.0 TACHYCARDIA: ICD-10-CM

## 2022-01-01 DIAGNOSIS — N39.45 CONTINUOUS LEAKAGE OF URINE: ICD-10-CM

## 2022-01-01 DIAGNOSIS — G31.84 MCI (MILD COGNITIVE IMPAIRMENT) WITH MEMORY LOSS: Primary | ICD-10-CM

## 2022-01-01 DIAGNOSIS — R41.3 OTHER AMNESIA: ICD-10-CM

## 2022-01-01 DIAGNOSIS — R65.20 SEPSIS WITH ACUTE RENAL FAILURE WITHOUT SEPTIC SHOCK, DUE TO UNSPECIFIED ORGANISM, UNSPECIFIED ACUTE RENAL FAILURE TYPE: Primary | ICD-10-CM

## 2022-01-01 DIAGNOSIS — Z85.46 HISTORY OF PROSTATE CANCER: ICD-10-CM

## 2022-01-01 DIAGNOSIS — G31.84 MCI (MILD COGNITIVE IMPAIRMENT) WITH MEMORY LOSS: ICD-10-CM

## 2022-01-01 DIAGNOSIS — M54.50 CHRONIC BILATERAL LOW BACK PAIN WITHOUT SCIATICA: ICD-10-CM

## 2022-01-01 DIAGNOSIS — F03.90 MAJOR NEUROCOGNITIVE DISORDER: Primary | ICD-10-CM

## 2022-01-01 DIAGNOSIS — E66.9 TYPE 2 DIABETES MELLITUS WITH OBESITY: Primary | ICD-10-CM

## 2022-01-01 DIAGNOSIS — D50.8 OTHER IRON DEFICIENCY ANEMIA: ICD-10-CM

## 2022-01-01 DIAGNOSIS — R73.9 HYPERGLYCEMIA: ICD-10-CM

## 2022-01-01 DIAGNOSIS — N17.9 AKI (ACUTE KIDNEY INJURY): ICD-10-CM

## 2022-01-01 DIAGNOSIS — E66.9 TYPE 2 DIABETES MELLITUS WITH OBESITY: ICD-10-CM

## 2022-01-01 DIAGNOSIS — J96.90 RESPIRATORY FAILURE: ICD-10-CM

## 2022-01-01 DIAGNOSIS — R06.00 DYSPNEA: ICD-10-CM

## 2022-01-01 DIAGNOSIS — I10 HYPERTENSION, UNSPECIFIED TYPE: ICD-10-CM

## 2022-01-01 DIAGNOSIS — Z23 NEEDS FLU SHOT: ICD-10-CM

## 2022-01-01 DIAGNOSIS — D63.8 ANEMIA, CHRONIC DISEASE: ICD-10-CM

## 2022-01-01 DIAGNOSIS — J96.01 ACUTE HYPOXEMIC RESPIRATORY FAILURE: ICD-10-CM

## 2022-01-01 DIAGNOSIS — E03.8 OTHER SPECIFIED HYPOTHYROIDISM: ICD-10-CM

## 2022-01-01 DIAGNOSIS — Z85.038 HISTORY OF COLON CANCER: Primary | ICD-10-CM

## 2022-01-01 DIAGNOSIS — E03.9 HYPOTHYROIDISM, UNSPECIFIED TYPE: ICD-10-CM

## 2022-01-01 DIAGNOSIS — G89.29 CHRONIC MIDLINE LOW BACK PAIN WITH BILATERAL SCIATICA: ICD-10-CM

## 2022-01-01 DIAGNOSIS — A41.9 SEPSIS: ICD-10-CM

## 2022-01-01 LAB
ALBUMIN SERPL BCP-MCNC: 2.9 G/DL (ref 3.5–5.2)
ALBUMIN SERPL BCP-MCNC: 3.1 G/DL (ref 3.5–5.2)
ALBUMIN SERPL BCP-MCNC: 4 G/DL (ref 3.5–5.2)
ALLENS TEST: ABNORMAL
ALP SERPL-CCNC: 113 U/L (ref 55–135)
ALP SERPL-CCNC: 72 U/L (ref 55–135)
ALP SERPL-CCNC: 85 U/L (ref 55–135)
ALP SERPL-CCNC: 90 U/L (ref 55–135)
ALT SERPL W/O P-5'-P-CCNC: 17 U/L (ref 10–44)
ALT SERPL W/O P-5'-P-CCNC: 27 U/L (ref 10–44)
ALT SERPL W/O P-5'-P-CCNC: 28 U/L (ref 10–44)
ALT SERPL W/O P-5'-P-CCNC: 434 U/L (ref 10–44)
ANION GAP SERPL CALC-SCNC: 10 MMOL/L (ref 8–16)
ANION GAP SERPL CALC-SCNC: 13 MMOL/L (ref 8–16)
ANION GAP SERPL CALC-SCNC: 14 MMOL/L (ref 8–16)
ANION GAP SERPL CALC-SCNC: 15 MMOL/L (ref 8–16)
ANION GAP SERPL CALC-SCNC: 7 MMOL/L (ref 8–16)
ANISOCYTOSIS BLD QL SMEAR: ABNORMAL
ANISOCYTOSIS BLD QL SMEAR: SLIGHT
ANISOCYTOSIS BLD QL SMEAR: SLIGHT
AORTIC ROOT ANNULUS: 3.61 CM
AST SERPL-CCNC: 20 U/L (ref 10–40)
AST SERPL-CCNC: 39 U/L (ref 10–40)
AST SERPL-CCNC: 42 U/L (ref 10–40)
AST SERPL-CCNC: 835 U/L (ref 10–40)
AV INDEX (PROSTH): 0.68
AV MEAN GRADIENT: 3 MMHG
AV PEAK GRADIENT: 5 MMHG
AV VALVE AREA: 2.28 CM2
AV VELOCITY RATIO: 0.7
B-OH-BUTYR BLD STRIP-SCNC: 0.1 MMOL/L (ref 0–0.5)
BACTERIA #/AREA URNS HPF: NEGATIVE /HPF
BASOPHILS # BLD AUTO: 0.06 K/UL (ref 0–0.2)
BASOPHILS NFR BLD: 0 % (ref 0–1.9)
BASOPHILS NFR BLD: 1.1 % (ref 0–1.9)
BILIRUB SERPL-MCNC: 0.8 MG/DL (ref 0.1–1)
BILIRUB SERPL-MCNC: 0.9 MG/DL (ref 0.1–1)
BILIRUB SERPL-MCNC: 1.2 MG/DL (ref 0.1–1)
BILIRUB SERPL-MCNC: 1.5 MG/DL (ref 0.1–1)
BILIRUB UR QL STRIP: ABNORMAL
BILIRUB UR QL STRIP: ABNORMAL
BNP SERPL-MCNC: 856 PG/ML (ref 0–99)
BSA FOR ECHO PROCEDURE: 1.84 M2
BUN SERPL-MCNC: 37 MG/DL (ref 8–23)
BUN SERPL-MCNC: 37 MG/DL (ref 8–23)
BUN SERPL-MCNC: 39 MG/DL (ref 8–23)
BUN SERPL-MCNC: 56 MG/DL (ref 8–23)
BUN SERPL-MCNC: 8 MG/DL (ref 8–23)
CALCIUM SERPL-MCNC: 8.8 MG/DL (ref 8.7–10.5)
CALCIUM SERPL-MCNC: 8.9 MG/DL (ref 8.7–10.5)
CALCIUM SERPL-MCNC: 8.9 MG/DL (ref 8.7–10.5)
CALCIUM SERPL-MCNC: 9.2 MG/DL (ref 8.7–10.5)
CALCIUM SERPL-MCNC: 9.4 MG/DL (ref 8.7–10.5)
CEA SERPL-MCNC: 3.8 NG/ML (ref 0–5)
CHLORIDE SERPL-SCNC: 88 MMOL/L (ref 95–110)
CHLORIDE SERPL-SCNC: 92 MMOL/L (ref 95–110)
CHLORIDE SERPL-SCNC: 96 MMOL/L (ref 95–110)
CHLORIDE SERPL-SCNC: 98 MMOL/L (ref 95–110)
CHLORIDE SERPL-SCNC: 98 MMOL/L (ref 95–110)
CHOLEST SERPL-MCNC: 151 MG/DL (ref 120–199)
CHOLEST/HDLC SERPL: 2.9 {RATIO} (ref 2–5)
CK MB SERPL-MCNC: 22.3 NG/ML (ref 0.1–6.5)
CK MB SERPL-MCNC: 3.2 NG/ML (ref 0.1–6.5)
CK SERPL-CCNC: 131 U/L (ref 20–200)
CK SERPL-CCNC: 75 U/L (ref 20–200)
CLARITY UR: ABNORMAL
CLARITY UR: ABNORMAL
CO2 SERPL-SCNC: 21 MMOL/L (ref 23–29)
CO2 SERPL-SCNC: 22 MMOL/L (ref 23–29)
CO2 SERPL-SCNC: 24 MMOL/L (ref 23–29)
CO2 SERPL-SCNC: 25 MMOL/L (ref 23–29)
CO2 SERPL-SCNC: 31 MMOL/L (ref 23–29)
COLOR UR: YELLOW
COLOR UR: YELLOW
CREAT SERPL-MCNC: 1.2 MG/DL (ref 0.5–1.4)
CREAT SERPL-MCNC: 2.7 MG/DL (ref 0.5–1.4)
CREAT SERPL-MCNC: 2.7 MG/DL (ref 0.5–1.4)
CREAT SERPL-MCNC: 3.1 MG/DL (ref 0.5–1.4)
CREAT SERPL-MCNC: 3.1 MG/DL (ref 0.5–1.4)
CV ECHO LV RWT: 0.87 CM
DELSYS: ABNORMAL
DIFFERENTIAL METHOD: ABNORMAL
DOP CALC AO PEAK VEL: 1.12 M/S
DOP CALC AO VTI: 11.4 CM
DOP CALC LVOT AREA: 3.3 CM2
DOP CALC LVOT DIAMETER: 2.06 CM
DOP CALC LVOT PEAK VEL: 0.78 M/S
DOP CALC LVOT STROKE VOLUME: 25.98 CM3
DOP CALC RVOT PEAK VEL: 0.3 M/S
DOP CALC RVOT VTI: 3.9 CM
DOP CALCLVOT PEAK VEL VTI: 7.8 CM
E WAVE DECELERATION TIME: 136.74 MSEC
E/A RATIO: 0.28
ECHO LV POSTERIOR WALL: 1.11 CM (ref 0.6–1.1)
EJECTION FRACTION: 70 %
EOSINOPHIL # BLD AUTO: 0.2 K/UL (ref 0–0.5)
EOSINOPHIL NFR BLD: 0 % (ref 0–8)
EOSINOPHIL NFR BLD: 2 % (ref 0–8)
EOSINOPHIL NFR BLD: 4 % (ref 0–8)
EOSINOPHIL NFR BLD: 4.1 % (ref 0–8)
EP: 5
EP: 8
ERYTHROCYTE [DISTWIDTH] IN BLOOD BY AUTOMATED COUNT: 12.1 % (ref 11.5–14.5)
ERYTHROCYTE [DISTWIDTH] IN BLOOD BY AUTOMATED COUNT: 12.7 % (ref 11.5–14.5)
ERYTHROCYTE [DISTWIDTH] IN BLOOD BY AUTOMATED COUNT: 12.8 % (ref 11.5–14.5)
ERYTHROCYTE [DISTWIDTH] IN BLOOD BY AUTOMATED COUNT: 12.8 % (ref 11.5–14.5)
ERYTHROCYTE [SEDIMENTATION RATE] IN BLOOD BY WESTERGREN METHOD: 15 MM/H
ERYTHROCYTE [SEDIMENTATION RATE] IN BLOOD BY WESTERGREN METHOD: 34 MM/H
EST. GFR  (NO RACE VARIABLE): 20.1 ML/MIN/1.73 M^2
EST. GFR  (NO RACE VARIABLE): 20.1 ML/MIN/1.73 M^2
EST. GFR  (NO RACE VARIABLE): 23.7 ML/MIN/1.73 M^2
EST. GFR  (NO RACE VARIABLE): 23.7 ML/MIN/1.73 M^2
EST. GFR  (NO RACE VARIABLE): >60 ML/MIN/1.73 M^2
ESTIMATED AVG GLUCOSE: 174 MG/DL (ref 68–131)
FERRITIN SERPL-MCNC: 76 NG/ML (ref 20–300)
FIO2: 100
FIO2: 40
FIO2: 50
FLOW: 15
FLOW: 3
FOLATE SERPL-MCNC: 15 NG/ML (ref 4–24)
FRACTIONAL SHORTENING: 59 % (ref 28–44)
GLUCOSE SERPL-MCNC: 148 MG/DL (ref 70–110)
GLUCOSE SERPL-MCNC: 213 MG/DL (ref 70–110)
GLUCOSE SERPL-MCNC: 215 MG/DL (ref 70–110)
GLUCOSE SERPL-MCNC: 232 MG/DL (ref 70–110)
GLUCOSE SERPL-MCNC: 232 MG/DL (ref 70–110)
GLUCOSE SERPL-MCNC: 233 MG/DL (ref 70–110)
GLUCOSE SERPL-MCNC: 243 MG/DL (ref 70–110)
GLUCOSE SERPL-MCNC: 246 MG/DL (ref 70–110)
GLUCOSE SERPL-MCNC: 247 MG/DL (ref 70–110)
GLUCOSE SERPL-MCNC: 253 MG/DL (ref 70–110)
GLUCOSE SERPL-MCNC: 303 MG/DL (ref 70–110)
GLUCOSE SERPL-MCNC: 315 MG/DL (ref 70–110)
GLUCOSE SERPL-MCNC: 321 MG/DL (ref 70–110)
GLUCOSE SERPL-MCNC: 348 MG/DL (ref 70–110)
GLUCOSE SERPL-MCNC: 475 MG/DL (ref 70–110)
GLUCOSE SERPL-MCNC: 569 MG/DL (ref 70–110)
GLUCOSE SERPL-MCNC: 581 MG/DL (ref 70–110)
GLUCOSE UR QL STRIP: ABNORMAL
GLUCOSE UR QL STRIP: ABNORMAL
HBA1C MFR BLD: 7.7 % (ref 4.5–6.2)
HCO3 UR-SCNC: 11.7 MMOL/L (ref 24–28)
HCO3 UR-SCNC: 14.8 MMOL/L (ref 24–28)
HCO3 UR-SCNC: 23.2 MMOL/L (ref 24–28)
HCO3 UR-SCNC: 23.6 MMOL/L (ref 24–28)
HCT VFR BLD AUTO: 31.7 % (ref 40–54)
HCT VFR BLD AUTO: 33 % (ref 40–54)
HCT VFR BLD AUTO: 33.7 % (ref 40–54)
HCT VFR BLD AUTO: 34.4 % (ref 40–54)
HCT VFR BLD CALC: 35 %PCV (ref 36–54)
HCT VFR BLD CALC: 35 %PCV (ref 36–54)
HDLC SERPL-MCNC: 52 MG/DL (ref 40–75)
HDLC SERPL: 34.4 % (ref 20–50)
HGB BLD-MCNC: 10.6 G/DL (ref 14–18)
HGB BLD-MCNC: 11.1 G/DL (ref 14–18)
HGB BLD-MCNC: 11.5 G/DL (ref 14–18)
HGB BLD-MCNC: 11.5 G/DL (ref 14–18)
HGB UR QL STRIP: ABNORMAL
HGB UR QL STRIP: ABNORMAL
HYALINE CASTS #/AREA URNS LPF: 164 /LPF
IMM GRANULOCYTES # BLD AUTO: 0.03 K/UL (ref 0–0.04)
IMM GRANULOCYTES # BLD AUTO: ABNORMAL K/UL (ref 0–0.04)
IMM GRANULOCYTES NFR BLD AUTO: 0.5 % (ref 0–0.5)
IMM GRANULOCYTES NFR BLD AUTO: ABNORMAL % (ref 0–0.5)
INFLUENZA A, MOLECULAR: NEGATIVE
INFLUENZA B, MOLECULAR: NEGATIVE
INR PPP: 1.7
INTERVENTRICULAR SEPTUM: 1.42 CM (ref 0.6–1.1)
IP: 14
IP: 15
IRON SERPL-MCNC: 69 UG/DL (ref 45–160)
KETONES UR QL STRIP: ABNORMAL
KETONES UR QL STRIP: ABNORMAL
LACTATE SERPL-SCNC: 1.5 MMOL/L (ref 0.5–1.9)
LACTATE SERPL-SCNC: 2 MMOL/L (ref 0.5–1.9)
LACTATE SERPL-SCNC: 2.4 MMOL/L (ref 0.5–1.9)
LACTATE SERPL-SCNC: 2.6 MMOL/L (ref 0.5–1.9)
LACTATE SERPL-SCNC: 3.4 MMOL/L (ref 0.5–1.9)
LDLC SERPL CALC-MCNC: 81 MG/DL (ref 63–159)
LEFT ATRIUM SIZE: 2.08 CM
LEFT INTERNAL DIMENSION IN SYSTOLE: 1.05 CM (ref 2.1–4)
LEFT VENTRICLE DIASTOLIC VOLUME INDEX: 13.16 ML/M2
LEFT VENTRICLE DIASTOLIC VOLUME: 23.68 ML
LEFT VENTRICLE MASS INDEX: 54 G/M2
LEFT VENTRICLE SYSTOLIC VOLUME INDEX: 5.8 ML/M2
LEFT VENTRICLE SYSTOLIC VOLUME: 10.52 ML
LEFT VENTRICULAR INTERNAL DIMENSION IN DIASTOLE: 2.56 CM (ref 3.5–6)
LEFT VENTRICULAR MASS: 96.36 G
LEUKOCYTE ESTERASE UR QL STRIP: NEGATIVE
LEUKOCYTE ESTERASE UR QL STRIP: NEGATIVE
LV SEPTAL E/E' RATIO: 4.83 M/S
LVOT MG: 1.1 MMHG
LVOT MV: 0.48 CM/S
LYMPHOCYTES # BLD AUTO: 1.8 K/UL (ref 1–4.8)
LYMPHOCYTES NFR BLD: 12 % (ref 18–48)
LYMPHOCYTES NFR BLD: 12 % (ref 18–48)
LYMPHOCYTES NFR BLD: 20 % (ref 18–48)
LYMPHOCYTES NFR BLD: 31.7 % (ref 18–48)
MAGNESIUM SERPL-MCNC: 1.4 MG/DL (ref 1.6–2.6)
MAGNESIUM SERPL-MCNC: 1.7 MG/DL (ref 1.6–2.6)
MAGNESIUM SERPL-MCNC: 2.9 MG/DL (ref 1.6–2.6)
MCH RBC QN AUTO: 30.7 PG (ref 27–31)
MCH RBC QN AUTO: 30.7 PG (ref 27–31)
MCH RBC QN AUTO: 30.8 PG (ref 27–31)
MCH RBC QN AUTO: 31.3 PG (ref 27–31)
MCHC RBC AUTO-ENTMCNC: 33.4 G/DL (ref 32–36)
MCHC RBC AUTO-ENTMCNC: 33.4 G/DL (ref 32–36)
MCHC RBC AUTO-ENTMCNC: 33.6 G/DL (ref 32–36)
MCHC RBC AUTO-ENTMCNC: 34.1 G/DL (ref 32–36)
MCV RBC AUTO: 90 FL (ref 82–98)
MCV RBC AUTO: 91 FL (ref 82–98)
MCV RBC AUTO: 92 FL (ref 82–98)
MCV RBC AUTO: 94 FL (ref 82–98)
METAMYELOCYTES NFR BLD MANUAL: 1 %
METAMYELOCYTES NFR BLD MANUAL: 3 %
METAMYELOCYTES NFR BLD MANUAL: 8 %
MICROSCOPIC COMMENT: ABNORMAL
MIN VOL: 20.6
MIN VOL: 30
MODE: ABNORMAL
MONOCYTES # BLD AUTO: 0.5 K/UL (ref 0.3–1)
MONOCYTES NFR BLD: 24 % (ref 4–15)
MONOCYTES NFR BLD: 30 % (ref 4–15)
MONOCYTES NFR BLD: 31 % (ref 4–15)
MONOCYTES NFR BLD: 8.5 % (ref 4–15)
MV PEAK A VEL: 1.04 M/S
MV PEAK E VEL: 0.29 M/S
MV STENOSIS PRESSURE HALF TIME: 39.65 MS
MV VALVE AREA P 1/2 METHOD: 5.55 CM2
MYELOCYTES NFR BLD MANUAL: 2 %
MYELOCYTES NFR BLD MANUAL: 3 %
MYELOCYTES NFR BLD MANUAL: 9 %
NEUTROPHILS # BLD AUTO: 3 K/UL (ref 1.8–7.7)
NEUTROPHILS NFR BLD: 15 % (ref 38–73)
NEUTROPHILS NFR BLD: 43 % (ref 38–73)
NEUTROPHILS NFR BLD: 51 % (ref 38–73)
NEUTROPHILS NFR BLD: 54.1 % (ref 38–73)
NEUTS BAND NFR BLD MANUAL: 21 %
NEUTS BAND NFR BLD MANUAL: 9 %
NITRITE UR QL STRIP: NEGATIVE
NITRITE UR QL STRIP: NEGATIVE
NONHDLC SERPL-MCNC: 99 MG/DL
NRBC BLD-RTO: 0 /100 WBC
NRBC BLD-RTO: 1 /100 WBC
OVALOCYTES BLD QL SMEAR: ABNORMAL
PCO2 BLDA: 22.4 MMHG (ref 35–45)
PCO2 BLDA: 24.2 MMHG (ref 35–45)
PCO2 BLDA: 41.5 MMHG (ref 35–45)
PCO2 BLDA: 45.1 MMHG (ref 35–45)
PH SMN: 7.06 [PH] (ref 7.35–7.45)
PH SMN: 7.33 [PH] (ref 7.35–7.45)
PH SMN: 7.43 [PH] (ref 7.35–7.45)
PH SMN: 7.59 [PH] (ref 7.35–7.45)
PH UR STRIP: 6 [PH] (ref 5–8)
PH UR STRIP: 6 [PH] (ref 5–8)
PHOSPHATE SERPL-MCNC: 2.4 MG/DL (ref 2.7–4.5)
PHOSPHATE SERPL-MCNC: 3 MG/DL (ref 2.7–4.5)
PHOSPHATE SERPL-MCNC: 3.1 MG/DL (ref 2.7–4.5)
PHOSPHATE SERPL-MCNC: 3.7 MG/DL (ref 2.7–4.5)
PISA TR MAX VEL: 3.25 M/S
PLATELET # BLD AUTO: 245 K/UL (ref 150–450)
PLATELET # BLD AUTO: 68 K/UL (ref 150–450)
PLATELET # BLD AUTO: 85 K/UL (ref 150–450)
PLATELET # BLD AUTO: 86 K/UL (ref 150–450)
PMV BLD AUTO: 11.9 FL (ref 9.2–12.9)
PMV BLD AUTO: 12 FL (ref 9.2–12.9)
PMV BLD AUTO: 12.8 FL (ref 9.2–12.9)
PMV BLD AUTO: 8.8 FL (ref 9.2–12.9)
PO2 BLDA: 38 MMHG (ref 40–60)
PO2 BLDA: 66 MMHG (ref 80–100)
PO2 BLDA: 76 MMHG (ref 80–100)
PO2 BLDA: 76 MMHG (ref 80–100)
POC BE: -10 MMOL/L
POC BE: -19 MMOL/L
POC BE: -2 MMOL/L
POC BE: 1 MMOL/L
POC IONIZED CALCIUM: 1.11 MMOL/L (ref 1.06–1.42)
POC IONIZED CALCIUM: 1.16 MMOL/L (ref 1.06–1.42)
POC SATURATED O2: 68 % (ref 95–100)
POC SATURATED O2: 88 % (ref 95–100)
POC SATURATED O2: 94 % (ref 95–100)
POC SATURATED O2: 97 % (ref 95–100)
POC TCO2: 13 MMOL/L (ref 23–27)
POC TCO2: 15 MMOL/L (ref 23–27)
POC TCO2: 24 MMOL/L (ref 23–27)
POC TCO2: 25 MMOL/L (ref 24–29)
POIKILOCYTOSIS BLD QL SMEAR: SLIGHT
POIKILOCYTOSIS BLD QL SMEAR: SLIGHT
POLYCHROMASIA BLD QL SMEAR: ABNORMAL
POTASSIUM BLD-SCNC: 6.1 MMOL/L (ref 3.5–5.1)
POTASSIUM BLD-SCNC: 8 MMOL/L (ref 3.5–5.1)
POTASSIUM SERPL-SCNC: 3.3 MMOL/L (ref 3.5–5.1)
POTASSIUM SERPL-SCNC: 3.6 MMOL/L (ref 3.5–5.1)
POTASSIUM SERPL-SCNC: 4 MMOL/L (ref 3.5–5.1)
POTASSIUM SERPL-SCNC: 4.3 MMOL/L (ref 3.5–5.1)
POTASSIUM SERPL-SCNC: 4.5 MMOL/L (ref 3.5–5.1)
PROCALCITONIN SERPL IA-MCNC: 1.5 NG/ML (ref 0–0.5)
PROCALCITONIN SERPL IA-MCNC: 1.52 NG/ML (ref 0–0.5)
PROCALCITONIN SERPL IA-MCNC: 2.65 NG/ML (ref 0–0.5)
PROT SERPL-MCNC: 5.9 G/DL (ref 6–8.4)
PROT SERPL-MCNC: 6.2 G/DL (ref 6–8.4)
PROT SERPL-MCNC: 6.5 G/DL (ref 6–8.4)
PROT SERPL-MCNC: 6.8 G/DL (ref 6–8.4)
PROT UR QL STRIP: ABNORMAL
PROT UR QL STRIP: ABNORMAL
PROTHROMBIN TIME: 18.7 SEC (ref 11.4–13.7)
PV MEAN GRADIENT: 0.18 MMHG
PV MV: 0.41 M/S
PV PEAK VELOCITY: 0.73 CM/S
RA PRESSURE: 3 MMHG
RBC # BLD AUTO: 3.44 M/UL (ref 4.6–6.2)
RBC # BLD AUTO: 3.61 M/UL (ref 4.6–6.2)
RBC # BLD AUTO: 3.68 M/UL (ref 4.6–6.2)
RBC # BLD AUTO: 3.74 M/UL (ref 4.6–6.2)
RBC #/AREA URNS HPF: 13 /HPF (ref 0–4)
RPR SER QL: NORMAL
RV TISSUE DOPPLER FREE WALL SYSTOLIC VELOCITY 1 (APICAL 4 CHAMBER VIEW): 0.01 CM/S
SAMPLE: ABNORMAL
SARS-COV-2 RDRP RESP QL NAA+PROBE: NEGATIVE
SATURATED IRON: 22 % (ref 20–50)
SCHISTOCYTES BLD QL SMEAR: PRESENT
SINUS: 2.95 CM
SITE: ABNORMAL
SODIUM BLD-SCNC: 130 MMOL/L (ref 136–145)
SODIUM BLD-SCNC: 130 MMOL/L (ref 136–145)
SODIUM SERPL-SCNC: 125 MMOL/L (ref 136–145)
SODIUM SERPL-SCNC: 130 MMOL/L (ref 136–145)
SODIUM SERPL-SCNC: 131 MMOL/L (ref 136–145)
SODIUM SERPL-SCNC: 132 MMOL/L (ref 136–145)
SODIUM SERPL-SCNC: 136 MMOL/L (ref 136–145)
SP GR UR STRIP: 1.02 (ref 1–1.03)
SP GR UR STRIP: 1.02 (ref 1–1.03)
SP02: 91
SP02: 94
SP02: 97
SPECIMEN SOURCE: NORMAL
SPONT RATE: 30
SPONT RATE: 34
SQUAMOUS #/AREA URNS HPF: 8 /HPF
STJ: 2.16 CM
TDI SEPTAL: 0.06 M/S
TOTAL IRON BINDING CAPACITY: 311 UG/DL (ref 250–450)
TR MAX PG: 42 MMHG
TRANSFERRIN SERPL-MCNC: 222 MG/DL (ref 200–375)
TRICUSPID ANNULAR PLANE SYSTOLIC EXCURSION: 1.55 CM
TRIGL SERPL-MCNC: 90 MG/DL (ref 30–150)
TROPONIN I SERPL DL<=0.01 NG/ML-MCNC: 0.23 NG/ML
TROPONIN I SERPL DL<=0.01 NG/ML-MCNC: 0.24 NG/ML
TROPONIN I SERPL DL<=0.01 NG/ML-MCNC: 0.29 NG/ML
TROPONIN I SERPL HS-MCNC: 163.6 PG/ML (ref 0–14.9)
TV REST PULMONARY ARTERY PRESSURE: 45 MMHG
URN SPEC COLLECT METH UR: ABNORMAL
URN SPEC COLLECT METH UR: ABNORMAL
UROBILINOGEN UR STRIP-ACNC: ABNORMAL EU/DL
UROBILINOGEN UR STRIP-ACNC: ABNORMAL EU/DL
VIT B1 BLD-MCNC: 85 UG/L (ref 38–122)
VIT B12 SERPL-MCNC: 779 PG/ML (ref 210–950)
WBC # BLD AUTO: 26.22 K/UL (ref 3.9–12.7)
WBC # BLD AUTO: 32.41 K/UL (ref 3.9–12.7)
WBC # BLD AUTO: 40.53 K/UL (ref 3.9–12.7)
WBC # BLD AUTO: 5.55 K/UL (ref 3.9–12.7)
WBC #/AREA URNS HPF: 9 /HPF (ref 0–5)
YEAST URNS QL MICRO: ABNORMAL

## 2022-01-01 PROCEDURE — 83036 HEMOGLOBIN GLYCOSYLATED A1C: CPT | Performed by: FAMILY MEDICINE

## 2022-01-01 PROCEDURE — 84295 ASSAY OF SERUM SODIUM: CPT

## 2022-01-01 PROCEDURE — 36415 COLL VENOUS BLD VENIPUNCTURE: CPT | Performed by: INTERNAL MEDICINE

## 2022-01-01 PROCEDURE — 99214 OFFICE O/P EST MOD 30 MIN: CPT | Mod: 25 | Performed by: FAMILY MEDICINE

## 2022-01-01 PROCEDURE — 84100 ASSAY OF PHOSPHORUS: CPT | Performed by: EMERGENCY MEDICINE

## 2022-01-01 PROCEDURE — 99223 1ST HOSP IP/OBS HIGH 75: CPT | Mod: ,,, | Performed by: INTERNAL MEDICINE

## 2022-01-01 PROCEDURE — 99205 PR OFFICE/OUTPT VISIT, NEW, LEVL V, 60-74 MIN: ICD-10-PCS | Mod: S$PBB,,, | Performed by: NURSE PRACTITIONER

## 2022-01-01 PROCEDURE — 86592 SYPHILIS TEST NON-TREP QUAL: CPT | Performed by: NURSE PRACTITIONER

## 2022-01-01 PROCEDURE — 80053 COMPREHEN METABOLIC PANEL: CPT | Performed by: HOSPITALIST

## 2022-01-01 PROCEDURE — 99205 OFFICE O/P NEW HI 60 MIN: CPT | Mod: S$PBB,,, | Performed by: NURSE PRACTITIONER

## 2022-01-01 PROCEDURE — 93005 ELECTROCARDIOGRAM TRACING: CPT | Performed by: SPECIALIST

## 2022-01-01 PROCEDURE — 80053 COMPREHEN METABOLIC PANEL: CPT | Performed by: EMERGENCY MEDICINE

## 2022-01-01 PROCEDURE — 99223 PR INITIAL HOSPITAL CARE,LEVL III: ICD-10-PCS | Mod: ,,, | Performed by: INTERNAL MEDICINE

## 2022-01-01 PROCEDURE — 82553 CREATINE MB FRACTION: CPT | Performed by: EMERGENCY MEDICINE

## 2022-01-01 PROCEDURE — 84466 ASSAY OF TRANSFERRIN: CPT | Performed by: INTERNAL MEDICINE

## 2022-01-01 PROCEDURE — 63600175 PHARM REV CODE 636 W HCPCS: Performed by: INTERNAL MEDICINE

## 2022-01-01 PROCEDURE — 96372 PR INJECTION,THERAP/PROPH/DIAG2ST, IM OR SUBCUT: ICD-10-PCS | Mod: S$GLB,,, | Performed by: INTERNAL MEDICINE

## 2022-01-01 PROCEDURE — 84132 ASSAY OF SERUM POTASSIUM: CPT

## 2022-01-01 PROCEDURE — 84484 ASSAY OF TROPONIN QUANT: CPT | Performed by: HOSPITALIST

## 2022-01-01 PROCEDURE — 99999 PR PBB SHADOW E&M-EST. PATIENT-LVL I: ICD-10-PCS | Mod: PBBFAC,,, | Performed by: PSYCHIATRY & NEUROLOGY

## 2022-01-01 PROCEDURE — 90662 IIV NO PRSV INCREASED AG IM: CPT | Mod: PBBFAC | Performed by: FAMILY MEDICINE

## 2022-01-01 PROCEDURE — 99999 PR PBB SHADOW E&M-EST. PATIENT-LVL I: CPT | Mod: PBBFAC,,, | Performed by: PSYCHIATRY & NEUROLOGY

## 2022-01-01 PROCEDURE — 99499 UNLISTED E&M SERVICE: CPT | Mod: S$PBB,,, | Performed by: PSYCHIATRY & NEUROLOGY

## 2022-01-01 PROCEDURE — 85007 BL SMEAR W/DIFF WBC COUNT: CPT | Performed by: HOSPITALIST

## 2022-01-01 PROCEDURE — 99999 PR PBB SHADOW E&M-EST. PATIENT-LVL III: ICD-10-PCS | Mod: PBBFAC,,, | Performed by: PHYSICIAN ASSISTANT

## 2022-01-01 PROCEDURE — 27000221 HC OXYGEN, UP TO 24 HOURS

## 2022-01-01 PROCEDURE — 82010 KETONE BODYS QUAN: CPT | Performed by: EMERGENCY MEDICINE

## 2022-01-01 PROCEDURE — 63600175 PHARM REV CODE 636 W HCPCS: Performed by: HOSPITALIST

## 2022-01-01 PROCEDURE — 99215 OFFICE O/P EST HI 40 MIN: CPT | Mod: S$PBB,,, | Performed by: PSYCHIATRY & NEUROLOGY

## 2022-01-01 PROCEDURE — 96372 THER/PROPH/DIAG INJ SC/IM: CPT | Mod: S$GLB,,, | Performed by: INTERNAL MEDICINE

## 2022-01-01 PROCEDURE — 99999 PR PBB SHADOW E&M-EST. PATIENT-LVL V: CPT | Mod: PBBFAC,,, | Performed by: PHYSICIAN ASSISTANT

## 2022-01-01 PROCEDURE — 90833 PSYTX W PT W E/M 30 MIN: CPT | Mod: ,,, | Performed by: PHYSICIAN ASSISTANT

## 2022-01-01 PROCEDURE — 96372 THER/PROPH/DIAG INJ SC/IM: CPT

## 2022-01-01 PROCEDURE — 83605 ASSAY OF LACTIC ACID: CPT | Performed by: HOSPITALIST

## 2022-01-01 PROCEDURE — 93010 ELECTROCARDIOGRAM REPORT: CPT | Mod: ,,, | Performed by: INTERNAL MEDICINE

## 2022-01-01 PROCEDURE — 99214 PR OFFICE/OUTPT VISIT, EST, LEVL IV, 30-39 MIN: ICD-10-PCS | Mod: S$PBB,AQ,, | Performed by: FAMILY MEDICINE

## 2022-01-01 PROCEDURE — 99215 OFFICE O/P EST HI 40 MIN: CPT | Mod: PBBFAC,PN | Performed by: PHYSICIAN ASSISTANT

## 2022-01-01 PROCEDURE — 94660 CPAP INITIATION&MGMT: CPT

## 2022-01-01 PROCEDURE — 25000003 PHARM REV CODE 250: Performed by: ANESTHESIOLOGY

## 2022-01-01 PROCEDURE — 94761 N-INVAS EAR/PLS OXIMETRY MLT: CPT

## 2022-01-01 PROCEDURE — 85027 COMPLETE CBC AUTOMATED: CPT | Performed by: HOSPITALIST

## 2022-01-01 PROCEDURE — 83880 ASSAY OF NATRIURETIC PEPTIDE: CPT | Performed by: HOSPITALIST

## 2022-01-01 PROCEDURE — 31500 INSERT EMERGENCY AIRWAY: CPT | Performed by: ANESTHESIOLOGY

## 2022-01-01 PROCEDURE — 82378 CARCINOEMBRYONIC ANTIGEN: CPT | Performed by: INTERNAL MEDICINE

## 2022-01-01 PROCEDURE — 99214 OFFICE O/P EST MOD 30 MIN: CPT | Mod: S$PBB,AQ,, | Performed by: FAMILY MEDICINE

## 2022-01-01 PROCEDURE — 85025 COMPLETE CBC W/AUTO DIFF WBC: CPT | Performed by: INTERNAL MEDICINE

## 2022-01-01 PROCEDURE — 82962 GLUCOSE BLOOD TEST: CPT

## 2022-01-01 PROCEDURE — 99215 OFFICE O/P EST HI 40 MIN: CPT | Mod: PBBFAC,PO | Performed by: NURSE PRACTITIONER

## 2022-01-01 PROCEDURE — 63600175 PHARM REV CODE 636 W HCPCS

## 2022-01-01 PROCEDURE — 85610 PROTHROMBIN TIME: CPT | Performed by: EMERGENCY MEDICINE

## 2022-01-01 PROCEDURE — 92950 HEART/LUNG RESUSCITATION CPR: CPT | Performed by: HOSPITALIST

## 2022-01-01 PROCEDURE — 99214 OFFICE O/P EST MOD 30 MIN: CPT | Mod: 25,S$GLB,, | Performed by: INTERNAL MEDICINE

## 2022-01-01 PROCEDURE — 93010 EKG 12-LEAD: ICD-10-PCS | Mod: 76,,, | Performed by: INTERNAL MEDICINE

## 2022-01-01 PROCEDURE — 36415 COLL VENOUS BLD VENIPUNCTURE: CPT | Performed by: EMERGENCY MEDICINE

## 2022-01-01 PROCEDURE — 96138 PSYCL/NRPSYC TECH 1ST: CPT | Mod: ,,, | Performed by: PSYCHIATRY & NEUROLOGY

## 2022-01-01 PROCEDURE — 96139 PR PSYCH/NEUROPSYCH TEST ADMIN/SCORING, BY TECH, 2+ TESTS, EA ADDTL 30 MIN: ICD-10-PCS | Mod: ,,, | Performed by: PSYCHIATRY & NEUROLOGY

## 2022-01-01 PROCEDURE — 93005 ELECTROCARDIOGRAM TRACING: CPT | Performed by: INTERNAL MEDICINE

## 2022-01-01 PROCEDURE — 20000000 HC ICU ROOM

## 2022-01-01 PROCEDURE — 83735 ASSAY OF MAGNESIUM: CPT | Performed by: EMERGENCY MEDICINE

## 2022-01-01 PROCEDURE — 90792 PSYCH DIAG EVAL W/MED SRVCS: CPT | Mod: ,,, | Performed by: PHYSICIAN ASSISTANT

## 2022-01-01 PROCEDURE — 94760 N-INVAS EAR/PLS OXIMETRY 1: CPT

## 2022-01-01 PROCEDURE — 94799 UNLISTED PULMONARY SVC/PX: CPT

## 2022-01-01 PROCEDURE — 83735 ASSAY OF MAGNESIUM: CPT | Performed by: HOSPITALIST

## 2022-01-01 PROCEDURE — 63600175 PHARM REV CODE 636 W HCPCS: Performed by: EMERGENCY MEDICINE

## 2022-01-01 PROCEDURE — 99213 OFFICE O/P EST LOW 20 MIN: CPT | Mod: PBBFAC,PN | Performed by: PHYSICIAN ASSISTANT

## 2022-01-01 PROCEDURE — 82728 ASSAY OF FERRITIN: CPT | Performed by: INTERNAL MEDICINE

## 2022-01-01 PROCEDURE — 99499 UNLISTED E&M SERVICE: CPT | Mod: 95,,, | Performed by: PSYCHIATRY & NEUROLOGY

## 2022-01-01 PROCEDURE — 99214 PR OFFICE/OUTPT VISIT, EST, LEVL IV, 30-39 MIN: ICD-10-PCS | Mod: S$PBB,,, | Performed by: PHYSICIAN ASSISTANT

## 2022-01-01 PROCEDURE — 80061 LIPID PANEL: CPT | Performed by: FAMILY MEDICINE

## 2022-01-01 PROCEDURE — 84100 ASSAY OF PHOSPHORUS: CPT | Performed by: HOSPITALIST

## 2022-01-01 PROCEDURE — 94002 VENT MGMT INPAT INIT DAY: CPT

## 2022-01-01 PROCEDURE — 82803 BLOOD GASES ANY COMBINATION: CPT

## 2022-01-01 PROCEDURE — 84484 ASSAY OF TROPONIN QUANT: CPT | Performed by: EMERGENCY MEDICINE

## 2022-01-01 PROCEDURE — 36415 COLL VENOUS BLD VENIPUNCTURE: CPT | Performed by: HOSPITALIST

## 2022-01-01 PROCEDURE — 99291 CRITICAL CARE FIRST HOUR: CPT

## 2022-01-01 PROCEDURE — 93306 ECHO (CUPID ONLY): ICD-10-PCS | Mod: 26,,, | Performed by: INTERNAL MEDICINE

## 2022-01-01 PROCEDURE — 99900035 HC TECH TIME PER 15 MIN (STAT)

## 2022-01-01 PROCEDURE — 96132 NRPSYC TST EVAL PHYS/QHP 1ST: CPT | Mod: ,,, | Performed by: PSYCHIATRY & NEUROLOGY

## 2022-01-01 PROCEDURE — 36415 COLL VENOUS BLD VENIPUNCTURE: CPT | Performed by: FAMILY MEDICINE

## 2022-01-01 PROCEDURE — 82550 ASSAY OF CK (CPK): CPT | Performed by: EMERGENCY MEDICINE

## 2022-01-01 PROCEDURE — 99214 OFFICE O/P EST MOD 30 MIN: CPT | Mod: 95,,, | Performed by: PHYSICIAN ASSISTANT

## 2022-01-01 PROCEDURE — 82607 VITAMIN B-12: CPT | Performed by: INTERNAL MEDICINE

## 2022-01-01 PROCEDURE — 63600175 PHARM REV CODE 636 W HCPCS: Performed by: ANESTHESIOLOGY

## 2022-01-01 PROCEDURE — 83605 ASSAY OF LACTIC ACID: CPT | Mod: 91 | Performed by: HOSPITALIST

## 2022-01-01 PROCEDURE — 87040 BLOOD CULTURE FOR BACTERIA: CPT | Performed by: EMERGENCY MEDICINE

## 2022-01-01 PROCEDURE — 99291 CRITICAL CARE FIRST HOUR: CPT | Mod: ,,, | Performed by: INTERNAL MEDICINE

## 2022-01-01 PROCEDURE — 25000003 PHARM REV CODE 250: Performed by: HOSPITALIST

## 2022-01-01 PROCEDURE — 99999 PR PBB SHADOW E&M-EST. PATIENT-LVL V: ICD-10-PCS | Mod: PBBFAC,,, | Performed by: NURSE PRACTITIONER

## 2022-01-01 PROCEDURE — 99215 OFFICE O/P EST HI 40 MIN: CPT | Performed by: FAMILY MEDICINE

## 2022-01-01 PROCEDURE — 84145 PROCALCITONIN (PCT): CPT | Performed by: HOSPITALIST

## 2022-01-01 PROCEDURE — 93306 TTE W/DOPPLER COMPLETE: CPT

## 2022-01-01 PROCEDURE — 96365 THER/PROPH/DIAG IV INF INIT: CPT

## 2022-01-01 PROCEDURE — 82553 CREATINE MB FRACTION: CPT | Performed by: HOSPITALIST

## 2022-01-01 PROCEDURE — 93010 EKG 12-LEAD: ICD-10-PCS | Mod: ,,, | Performed by: INTERNAL MEDICINE

## 2022-01-01 PROCEDURE — 83880 ASSAY OF NATRIURETIC PEPTIDE: CPT | Performed by: EMERGENCY MEDICINE

## 2022-01-01 PROCEDURE — 99211 OFF/OP EST MAY X REQ PHY/QHP: CPT | Mod: PBBFAC,PO | Performed by: PSYCHIATRY & NEUROLOGY

## 2022-01-01 PROCEDURE — 99499 NO LOS: ICD-10-PCS | Mod: S$PBB,,, | Performed by: PSYCHIATRY & NEUROLOGY

## 2022-01-01 PROCEDURE — 99999 PR PBB SHADOW E&M-EST. PATIENT-LVL V: CPT | Mod: PBBFAC,,, | Performed by: NURSE PRACTITIONER

## 2022-01-01 PROCEDURE — 99900031 HC PATIENT EDUCATION (STAT)

## 2022-01-01 PROCEDURE — 82330 ASSAY OF CALCIUM: CPT

## 2022-01-01 PROCEDURE — 93306 TTE W/DOPPLER COMPLETE: CPT | Mod: 26,,, | Performed by: INTERNAL MEDICINE

## 2022-01-01 PROCEDURE — 96361 HYDRATE IV INFUSION ADD-ON: CPT

## 2022-01-01 PROCEDURE — 99214 OFFICE O/P EST MOD 30 MIN: CPT | Mod: PBBFAC,PO | Performed by: PSYCHIATRY & NEUROLOGY

## 2022-01-01 PROCEDURE — 93010 ELECTROCARDIOGRAM REPORT: CPT | Mod: 76,,, | Performed by: INTERNAL MEDICINE

## 2022-01-01 PROCEDURE — 99214 PR OFFICE/OUTPT VISIT, EST, LEVL IV, 30-39 MIN: ICD-10-PCS | Mod: 25,S$GLB,, | Performed by: INTERNAL MEDICINE

## 2022-01-01 PROCEDURE — 80053 COMPREHEN METABOLIC PANEL: CPT | Performed by: INTERNAL MEDICINE

## 2022-01-01 PROCEDURE — 99499 NO LOS: ICD-10-PCS | Mod: 95,,, | Performed by: PSYCHIATRY & NEUROLOGY

## 2022-01-01 PROCEDURE — 83605 ASSAY OF LACTIC ACID: CPT | Mod: 91 | Performed by: EMERGENCY MEDICINE

## 2022-01-01 PROCEDURE — 84425 ASSAY OF VITAMIN B-1: CPT | Performed by: NURSE PRACTITIONER

## 2022-01-01 PROCEDURE — 99215 PR OFFICE/OUTPT VISIT, EST, LEVL V, 40-54 MIN: ICD-10-PCS | Mod: S$PBB,,, | Performed by: PSYCHIATRY & NEUROLOGY

## 2022-01-01 PROCEDURE — 99999 PR PBB SHADOW E&M-EST. PATIENT-LVL IV: ICD-10-PCS | Mod: PBBFAC,,, | Performed by: PSYCHIATRY & NEUROLOGY

## 2022-01-01 PROCEDURE — 37799 UNLISTED PX VASCULAR SURGERY: CPT

## 2022-01-01 PROCEDURE — 99999 PR PBB SHADOW E&M-EST. PATIENT-LVL V: ICD-10-PCS | Mod: PBBFAC,,, | Performed by: PHYSICIAN ASSISTANT

## 2022-01-01 PROCEDURE — 36600 WITHDRAWAL OF ARTERIAL BLOOD: CPT

## 2022-01-01 PROCEDURE — 99214 PR OFFICE/OUTPT VISIT, EST, LEVL IV, 30-39 MIN: ICD-10-PCS | Mod: S$GLB,,, | Performed by: INTERNAL MEDICINE

## 2022-01-01 PROCEDURE — 85007 BL SMEAR W/DIFF WBC COUNT: CPT | Performed by: EMERGENCY MEDICINE

## 2022-01-01 PROCEDURE — 93010 EKG 12-LEAD: ICD-10-PCS | Mod: ,,, | Performed by: SPECIALIST

## 2022-01-01 PROCEDURE — 82746 ASSAY OF FOLIC ACID SERUM: CPT | Performed by: INTERNAL MEDICINE

## 2022-01-01 PROCEDURE — 80069 RENAL FUNCTION PANEL: CPT | Performed by: HOSPITALIST

## 2022-01-01 PROCEDURE — 70551 MRI BRAIN STEM W/O DYE: CPT | Mod: TC,PO

## 2022-01-01 PROCEDURE — 25000003 PHARM REV CODE 250

## 2022-01-01 PROCEDURE — 96139 PSYCL/NRPSYC TST TECH EA: CPT | Mod: ,,, | Performed by: PSYCHIATRY & NEUROLOGY

## 2022-01-01 PROCEDURE — 90791 PSYCH DIAGNOSTIC EVALUATION: CPT | Mod: 95,,, | Performed by: PSYCHIATRY & NEUROLOGY

## 2022-01-01 PROCEDURE — U0002 COVID-19 LAB TEST NON-CDC: HCPCS | Performed by: EMERGENCY MEDICINE

## 2022-01-01 PROCEDURE — G0008 ADMIN INFLUENZA VIRUS VAC: HCPCS | Mod: PBBFAC | Performed by: FAMILY MEDICINE

## 2022-01-01 PROCEDURE — 84145 PROCALCITONIN (PCT): CPT | Performed by: EMERGENCY MEDICINE

## 2022-01-01 PROCEDURE — 82550 ASSAY OF CK (CPK): CPT | Performed by: HOSPITALIST

## 2022-01-01 PROCEDURE — 96133 PR NEUROPSYCHOLOGIC TEST EVAL SVCS, EA ADDTL HR: ICD-10-PCS | Mod: ,,, | Performed by: PSYCHIATRY & NEUROLOGY

## 2022-01-01 PROCEDURE — 96138 PR PSYCH/NEUROPSYCH TEST ADMIN/SCORING, BY TECH, 2+ TESTS, 1ST 30 MIN: ICD-10-PCS | Mod: ,,, | Performed by: PSYCHIATRY & NEUROLOGY

## 2022-01-01 PROCEDURE — 99291 PR CRITICAL CARE, E/M 30-74 MINUTES: ICD-10-PCS | Mod: ,,, | Performed by: INTERNAL MEDICINE

## 2022-01-01 PROCEDURE — 99999 PR PBB SHADOW E&M-EST. PATIENT-LVL IV: CPT | Mod: PBBFAC,,, | Performed by: PSYCHIATRY & NEUROLOGY

## 2022-01-01 PROCEDURE — 99214 OFFICE O/P EST MOD 30 MIN: CPT | Mod: S$GLB,,, | Performed by: INTERNAL MEDICINE

## 2022-01-01 PROCEDURE — 99999 PR PBB SHADOW E&M-EST. PATIENT-LVL III: CPT | Mod: PBBFAC,,, | Performed by: PHYSICIAN ASSISTANT

## 2022-01-01 PROCEDURE — 36415 COLL VENOUS BLD VENIPUNCTURE: CPT | Mod: PO | Performed by: NURSE PRACTITIONER

## 2022-01-01 PROCEDURE — 85014 HEMATOCRIT: CPT

## 2022-01-01 PROCEDURE — 93010 ELECTROCARDIOGRAM REPORT: CPT | Mod: ,,, | Performed by: SPECIALIST

## 2022-01-01 PROCEDURE — 90791 PR PSYCHIATRIC DIAGNOSTIC EVALUATION: ICD-10-PCS | Mod: 95,,, | Performed by: PSYCHIATRY & NEUROLOGY

## 2022-01-01 PROCEDURE — 81001 URINALYSIS AUTO W/SCOPE: CPT | Performed by: HOSPITALIST

## 2022-01-01 PROCEDURE — 90833 PR PSYCHOTHERAPY W/PATIENT W/E&M, 30 MIN (ADD ON): ICD-10-PCS | Mod: ,,, | Performed by: PHYSICIAN ASSISTANT

## 2022-01-01 PROCEDURE — 25000003 PHARM REV CODE 250: Performed by: INTERNAL MEDICINE

## 2022-01-01 PROCEDURE — 87502 INFLUENZA DNA AMP PROBE: CPT | Performed by: EMERGENCY MEDICINE

## 2022-01-01 PROCEDURE — 99214 OFFICE O/P EST MOD 30 MIN: CPT | Mod: S$PBB,,, | Performed by: PHYSICIAN ASSISTANT

## 2022-01-01 PROCEDURE — 85027 COMPLETE CBC AUTOMATED: CPT | Performed by: EMERGENCY MEDICINE

## 2022-01-01 PROCEDURE — 96133 NRPSYC TST EVAL PHYS/QHP EA: CPT | Mod: ,,, | Performed by: PSYCHIATRY & NEUROLOGY

## 2022-01-01 PROCEDURE — 25000003 PHARM REV CODE 250: Performed by: EMERGENCY MEDICINE

## 2022-01-01 PROCEDURE — 96132 PR NEUROPSYCHOLOGIC TEST EVAL SVCS, 1ST HR: ICD-10-PCS | Mod: ,,, | Performed by: PSYCHIATRY & NEUROLOGY

## 2022-01-01 PROCEDURE — 99214 PR OFFICE/OUTPT VISIT, EST, LEVL IV, 30-39 MIN: ICD-10-PCS | Mod: 95,,, | Performed by: PHYSICIAN ASSISTANT

## 2022-01-01 PROCEDURE — 90792 PR PSYCHIATRIC DIAGNOSTIC EVALUATION W/MEDICAL SERVICES: ICD-10-PCS | Mod: ,,, | Performed by: PHYSICIAN ASSISTANT

## 2022-01-01 PROCEDURE — 84145 PROCALCITONIN (PCT): CPT | Mod: 91 | Performed by: INTERNAL MEDICINE

## 2022-01-01 RX ORDER — DOXYCYCLINE 100 MG/1
100 CAPSULE ORAL EVERY 12 HOURS
Status: DISCONTINUED | OUTPATIENT
Start: 2022-01-01 | End: 2022-01-01 | Stop reason: HOSPADM

## 2022-01-01 RX ORDER — CYANOCOBALAMIN 1000 UG/ML
1000 INJECTION, SOLUTION INTRAMUSCULAR; SUBCUTANEOUS
Status: CANCELLED | OUTPATIENT
Start: 2022-01-01

## 2022-01-01 RX ORDER — MEROPENEM AND SODIUM CHLORIDE 500 MG/50ML
500 INJECTION, SOLUTION INTRAVENOUS
Status: DISCONTINUED | OUTPATIENT
Start: 2022-01-01 | End: 2022-01-01 | Stop reason: HOSPADM

## 2022-01-01 RX ORDER — SIMETHICONE 80 MG
1 TABLET,CHEWABLE ORAL 4 TIMES DAILY PRN
Status: DISCONTINUED | OUTPATIENT
Start: 2022-01-01 | End: 2022-01-01 | Stop reason: HOSPADM

## 2022-01-01 RX ORDER — SODIUM,POTASSIUM PHOSPHATES 280-250MG
2 POWDER IN PACKET (EA) ORAL
Status: DISCONTINUED | OUTPATIENT
Start: 2022-01-01 | End: 2022-01-01 | Stop reason: HOSPADM

## 2022-01-01 RX ORDER — PANTOPRAZOLE SODIUM 40 MG/1
40 TABLET, DELAYED RELEASE ORAL DAILY
Status: DISCONTINUED | OUTPATIENT
Start: 2022-01-01 | End: 2022-01-01 | Stop reason: HOSPADM

## 2022-01-01 RX ORDER — LEVOTHYROXINE SODIUM 25 UG/1
75 TABLET ORAL
Status: DISCONTINUED | OUTPATIENT
Start: 2022-01-01 | End: 2022-01-01 | Stop reason: HOSPADM

## 2022-01-01 RX ORDER — CYANOCOBALAMIN 1000 UG/ML
INJECTION, SOLUTION INTRAMUSCULAR; SUBCUTANEOUS
Status: COMPLETED
Start: 2022-01-01 | End: 2022-01-01

## 2022-01-01 RX ORDER — IBUPROFEN 200 MG
24 TABLET ORAL
Status: DISCONTINUED | OUTPATIENT
Start: 2022-01-01 | End: 2022-01-01 | Stop reason: HOSPADM

## 2022-01-01 RX ORDER — ETOMIDATE 2 MG/ML
14 INJECTION INTRAVENOUS ONCE
Status: COMPLETED | OUTPATIENT
Start: 2022-01-01 | End: 2022-01-01

## 2022-01-01 RX ORDER — NOREPINEPHRINE BITARTRATE/D5W 4MG/250ML
PLASTIC BAG, INJECTION (ML) INTRAVENOUS
Status: COMPLETED
Start: 2022-01-01 | End: 2022-01-01

## 2022-01-01 RX ORDER — ROCURONIUM BROMIDE 10 MG/ML
30 INJECTION, SOLUTION INTRAVENOUS ONCE
Status: COMPLETED | OUTPATIENT
Start: 2022-01-01 | End: 2022-01-01

## 2022-01-01 RX ORDER — CHLORHEXIDINE GLUCONATE ORAL RINSE 1.2 MG/ML
15 SOLUTION DENTAL 2 TIMES DAILY
Status: DISCONTINUED | OUTPATIENT
Start: 2022-01-01 | End: 2022-01-01 | Stop reason: HOSPADM

## 2022-01-01 RX ORDER — MIRABEGRON 50 MG/1
1 TABLET, FILM COATED, EXTENDED RELEASE ORAL DAILY
Qty: 30 TABLET | Refills: 11 | Status: SHIPPED | OUTPATIENT
Start: 2022-01-01 | End: 2023-04-04

## 2022-01-01 RX ORDER — ENOXAPARIN SODIUM 100 MG/ML
40 INJECTION SUBCUTANEOUS EVERY 24 HOURS
Status: DISCONTINUED | OUTPATIENT
Start: 2022-01-01 | End: 2022-01-01

## 2022-01-01 RX ORDER — NAPROXEN SODIUM 220 MG/1
324 TABLET, FILM COATED ORAL
Status: COMPLETED | OUTPATIENT
Start: 2022-01-01 | End: 2022-01-01

## 2022-01-01 RX ORDER — ASPIRIN 325 MG
325 TABLET, DELAYED RELEASE (ENTERIC COATED) ORAL DAILY
Status: DISCONTINUED | OUTPATIENT
Start: 2022-01-01 | End: 2022-01-01 | Stop reason: HOSPADM

## 2022-01-01 RX ORDER — PREGABALIN 75 MG/1
150 CAPSULE ORAL NIGHTLY
Qty: 90 CAPSULE | Refills: 1 | Status: SHIPPED | OUTPATIENT
Start: 2022-01-01 | End: 2022-01-01

## 2022-01-01 RX ORDER — ENOXAPARIN SODIUM 100 MG/ML
30 INJECTION SUBCUTANEOUS EVERY 24 HOURS
Status: DISCONTINUED | OUTPATIENT
Start: 2022-01-01 | End: 2022-01-01 | Stop reason: HOSPADM

## 2022-01-01 RX ORDER — IBUPROFEN 200 MG
16 TABLET ORAL
Status: DISCONTINUED | OUTPATIENT
Start: 2022-01-01 | End: 2022-01-01 | Stop reason: HOSPADM

## 2022-01-01 RX ORDER — ACETAMINOPHEN 325 MG/1
650 TABLET ORAL EVERY 4 HOURS PRN
Status: DISCONTINUED | OUTPATIENT
Start: 2022-01-01 | End: 2022-01-01 | Stop reason: HOSPADM

## 2022-01-01 RX ORDER — GLUCAGON 1 MG
1 KIT INJECTION
Status: DISCONTINUED | OUTPATIENT
Start: 2022-01-01 | End: 2022-01-01 | Stop reason: HOSPADM

## 2022-01-01 RX ORDER — PROPOFOL 10 MG/ML
0-50 INJECTION, EMULSION INTRAVENOUS CONTINUOUS
Status: DISCONTINUED | OUTPATIENT
Start: 2022-01-01 | End: 2022-01-01 | Stop reason: HOSPADM

## 2022-01-01 RX ORDER — ONDANSETRON 2 MG/ML
4 INJECTION INTRAMUSCULAR; INTRAVENOUS EVERY 8 HOURS PRN
Status: DISCONTINUED | OUTPATIENT
Start: 2022-01-01 | End: 2022-01-01 | Stop reason: HOSPADM

## 2022-01-01 RX ORDER — OLANZAPINE 2.5 MG/1
2.5 TABLET ORAL NIGHTLY
COMMUNITY

## 2022-01-01 RX ORDER — OXYBUTYNIN CHLORIDE 5 MG/1
10 TABLET, EXTENDED RELEASE ORAL DAILY
Status: DISCONTINUED | OUTPATIENT
Start: 2022-01-01 | End: 2022-01-01 | Stop reason: HOSPADM

## 2022-01-01 RX ORDER — DONEPEZIL HYDROCHLORIDE 10 MG/1
10 TABLET, FILM COATED ORAL NIGHTLY
Qty: 30 TABLET | Refills: 11 | Status: SHIPPED | OUTPATIENT
Start: 2022-01-01 | End: 2023-10-24

## 2022-01-01 RX ORDER — POTASSIUM CHLORIDE 20 MEQ/1
40 TABLET, EXTENDED RELEASE ORAL ONCE
Status: COMPLETED | OUTPATIENT
Start: 2022-01-01 | End: 2022-01-01

## 2022-01-01 RX ORDER — SODIUM CHLORIDE 0.9 % (FLUSH) 0.9 %
3 SYRINGE (ML) INJECTION EVERY 12 HOURS PRN
Status: DISCONTINUED | OUTPATIENT
Start: 2022-01-01 | End: 2022-01-01 | Stop reason: HOSPADM

## 2022-01-01 RX ORDER — MEROPENEM AND SODIUM CHLORIDE 500 MG/50ML
500 INJECTION, SOLUTION INTRAVENOUS
Status: DISCONTINUED | OUTPATIENT
Start: 2022-01-01 | End: 2022-01-01

## 2022-01-01 RX ORDER — TRIAMCINOLONE ACETONIDE 1 MG/G
CREAM TOPICAL
COMMUNITY
Start: 2022-01-01

## 2022-01-01 RX ORDER — SUCCINYLCHOLINE CHLORIDE 20 MG/ML INJECTION SOLUTION
120 SOLUTION ONCE
Status: COMPLETED | OUTPATIENT
Start: 2022-01-01 | End: 2022-01-01

## 2022-01-01 RX ORDER — OLANZAPINE 5 MG/1
TABLET ORAL
Qty: 90 TABLET | Refills: 1 | OUTPATIENT
Start: 2022-01-01

## 2022-01-01 RX ORDER — CYANOCOBALAMIN 1000 UG/ML
1000 INJECTION, SOLUTION INTRAMUSCULAR; SUBCUTANEOUS
Status: DISCONTINUED | OUTPATIENT
Start: 2022-01-01 | End: 2022-01-01 | Stop reason: HOSPADM

## 2022-01-01 RX ORDER — TALC
6 POWDER (GRAM) TOPICAL NIGHTLY PRN
Status: DISCONTINUED | OUTPATIENT
Start: 2022-01-01 | End: 2022-01-01 | Stop reason: HOSPADM

## 2022-01-01 RX ORDER — INSULIN ASPART 100 [IU]/ML
1-10 INJECTION, SOLUTION INTRAVENOUS; SUBCUTANEOUS
Status: DISCONTINUED | OUTPATIENT
Start: 2022-01-01 | End: 2022-01-01 | Stop reason: HOSPADM

## 2022-01-01 RX ORDER — DULOXETIN HYDROCHLORIDE 30 MG/1
CAPSULE, DELAYED RELEASE ORAL
Qty: 90 CAPSULE | Refills: 1 | Status: SHIPPED | OUTPATIENT
Start: 2022-01-01

## 2022-01-01 RX ORDER — LANOLIN ALCOHOL/MO/W.PET/CERES
800 CREAM (GRAM) TOPICAL
Status: DISCONTINUED | OUTPATIENT
Start: 2022-01-01 | End: 2022-01-01 | Stop reason: HOSPADM

## 2022-01-01 RX ORDER — MORPHINE SULFATE 2 MG/ML
2 INJECTION, SOLUTION INTRAMUSCULAR; INTRAVENOUS EVERY 4 HOURS PRN
Status: DISCONTINUED | OUTPATIENT
Start: 2022-01-01 | End: 2022-01-01 | Stop reason: HOSPADM

## 2022-01-01 RX ORDER — MUPIROCIN 20 MG/G
OINTMENT TOPICAL 2 TIMES DAILY
Status: DISCONTINUED | OUTPATIENT
Start: 2022-01-01 | End: 2022-01-01 | Stop reason: HOSPADM

## 2022-01-01 RX ORDER — PHENYLEPHRINE HYDROCHLORIDE 10 MG/ML
INJECTION INTRAVENOUS
Status: DISCONTINUED
Start: 2022-01-01 | End: 2022-01-01 | Stop reason: HOSPADM

## 2022-01-01 RX ORDER — NOREPINEPHRINE BITARTRATE/D5W 4MG/250ML
0-3 PLASTIC BAG, INJECTION (ML) INTRAVENOUS CONTINUOUS
Status: DISCONTINUED | OUTPATIENT
Start: 2022-01-01 | End: 2022-01-01 | Stop reason: HOSPADM

## 2022-01-01 RX ORDER — SODIUM CHLORIDE, SODIUM LACTATE, POTASSIUM CHLORIDE, CALCIUM CHLORIDE 600; 310; 30; 20 MG/100ML; MG/100ML; MG/100ML; MG/100ML
INJECTION, SOLUTION INTRAVENOUS CONTINUOUS
Status: DISCONTINUED | OUTPATIENT
Start: 2022-01-01 | End: 2022-01-01

## 2022-01-01 RX ORDER — METOPROLOL TARTRATE 1 MG/ML
5 INJECTION, SOLUTION INTRAVENOUS ONCE
Status: COMPLETED | OUTPATIENT
Start: 2022-01-01 | End: 2022-01-01

## 2022-01-01 RX ORDER — MAGNESIUM SULFATE HEPTAHYDRATE 40 MG/ML
2 INJECTION, SOLUTION INTRAVENOUS ONCE
Status: COMPLETED | OUTPATIENT
Start: 2022-01-01 | End: 2022-01-01

## 2022-01-01 RX ORDER — NAPROXEN SODIUM 220 MG/1
324 TABLET, FILM COATED ORAL ONCE
Status: DISCONTINUED | OUTPATIENT
Start: 2022-01-01 | End: 2022-01-01

## 2022-01-01 RX ORDER — SODIUM,POTASSIUM PHOSPHATES 280-250MG
2 POWDER IN PACKET (EA) ORAL
Status: DISPENSED | OUTPATIENT
Start: 2022-01-01 | End: 2022-01-01

## 2022-01-01 RX ORDER — SEMAGLUTIDE 1.34 MG/ML
1 INJECTION, SOLUTION SUBCUTANEOUS
Qty: 1 PEN | Refills: 2 | Status: SHIPPED | OUTPATIENT
Start: 2022-01-01 | End: 2023-01-25

## 2022-01-01 RX ORDER — POLYETHYLENE GLYCOL 3350 17 G/17G
17 POWDER, FOR SOLUTION ORAL 2 TIMES DAILY
Status: DISCONTINUED | OUTPATIENT
Start: 2022-01-01 | End: 2022-01-01 | Stop reason: HOSPADM

## 2022-01-01 RX ORDER — DULOXETIN HYDROCHLORIDE 60 MG/1
CAPSULE, DELAYED RELEASE ORAL
Qty: 90 CAPSULE | Refills: 1 | Status: SHIPPED | OUTPATIENT
Start: 2022-01-01

## 2022-01-01 RX ORDER — ATORVASTATIN CALCIUM 40 MG/1
40 TABLET, FILM COATED ORAL NIGHTLY
Status: DISCONTINUED | OUTPATIENT
Start: 2022-01-01 | End: 2022-01-01 | Stop reason: HOSPADM

## 2022-01-01 RX ORDER — ACETAMINOPHEN 650 MG/1
650 SUPPOSITORY RECTAL ONCE
Status: DISCONTINUED | OUTPATIENT
Start: 2022-01-01 | End: 2022-01-01 | Stop reason: HOSPADM

## 2022-01-01 RX ORDER — ACETAMINOPHEN 325 MG/1
650 TABLET ORAL EVERY 8 HOURS PRN
Status: DISCONTINUED | OUTPATIENT
Start: 2022-01-01 | End: 2022-01-01 | Stop reason: HOSPADM

## 2022-01-01 RX ORDER — ENOXAPARIN SODIUM 100 MG/ML
30 INJECTION SUBCUTANEOUS EVERY 24 HOURS
Status: DISCONTINUED | OUTPATIENT
Start: 2022-01-01 | End: 2022-01-01

## 2022-01-01 RX ORDER — NALOXONE HCL 0.4 MG/ML
0.02 VIAL (ML) INJECTION
Status: DISCONTINUED | OUTPATIENT
Start: 2022-01-01 | End: 2022-01-01 | Stop reason: HOSPADM

## 2022-01-01 RX ORDER — OLANZAPINE 2.5 MG/1
2.5 TABLET ORAL NIGHTLY
Qty: 30 TABLET | Refills: 1 | Status: SHIPPED | OUTPATIENT
Start: 2022-01-01 | End: 2022-01-01

## 2022-01-01 RX ADMIN — CYANOCOBALAMIN 1000 MCG: 1000 INJECTION, SOLUTION INTRAMUSCULAR at 01:04

## 2022-01-01 RX ADMIN — INSULIN HUMAN 10 UNITS: 100 INJECTION, SOLUTION PARENTERAL at 05:11

## 2022-01-01 RX ADMIN — CHLORHEXIDINE GLUCONATE 15 ML: 1.2 RINSE ORAL at 08:11

## 2022-01-01 RX ADMIN — CYANOCOBALAMIN 1000 MCG: 1000 INJECTION INTRAMUSCULAR; SUBCUTANEOUS at 11:02

## 2022-01-01 RX ADMIN — POLYETHYLENE GLYCOL 3350 17 G: 17 POWDER, FOR SOLUTION ORAL at 11:11

## 2022-01-01 RX ADMIN — MEROPENEM AND SODIUM CHLORIDE 500 MG: 500 INJECTION, SOLUTION INTRAVENOUS at 09:11

## 2022-01-01 RX ADMIN — Medication 0.02 MCG/KG/MIN: at 08:11

## 2022-01-01 RX ADMIN — CYANOCOBALAMIN 1000 MCG: 1000 INJECTION, SOLUTION INTRAMUSCULAR at 11:02

## 2022-01-01 RX ADMIN — SODIUM CHLORIDE, SODIUM LACTATE, POTASSIUM CHLORIDE, AND CALCIUM CHLORIDE 2178 ML: .6; .31; .03; .02 INJECTION, SOLUTION INTRAVENOUS at 12:11

## 2022-01-01 RX ADMIN — SODIUM CHLORIDE, SODIUM LACTATE, POTASSIUM CHLORIDE, AND CALCIUM CHLORIDE: .6; .31; .03; .02 INJECTION, SOLUTION INTRAVENOUS at 07:11

## 2022-01-01 RX ADMIN — DOXYCYCLINE HYCLATE 100 MG: 100 CAPSULE ORAL at 08:11

## 2022-01-01 RX ADMIN — POTASSIUM, SODIUM PHOSPHATES 280 MG-160 MG-250 MG ORAL POWDER PACKET 2 PACKET: POWDER IN PACKET at 06:11

## 2022-01-01 RX ADMIN — CYANOCOBALAMIN 1000 MCG: 1000 INJECTION, SOLUTION INTRAMUSCULAR at 01:03

## 2022-01-01 RX ADMIN — ATORVASTATIN CALCIUM 40 MG: 40 TABLET, FILM COATED ORAL at 08:11

## 2022-01-01 RX ADMIN — OXYBUTYNIN CHLORIDE 10 MG: 5 TABLET, EXTENDED RELEASE ORAL at 11:11

## 2022-01-01 RX ADMIN — ENOXAPARIN SODIUM 30 MG: 30 INJECTION SUBCUTANEOUS at 05:11

## 2022-01-01 RX ADMIN — PANTOPRAZOLE SODIUM 40 MG: 40 TABLET, DELAYED RELEASE ORAL at 11:11

## 2022-01-01 RX ADMIN — CEFTRIAXONE 1 G: 1 INJECTION, SOLUTION INTRAVENOUS at 03:11

## 2022-01-01 RX ADMIN — OXYBUTYNIN CHLORIDE 10 MG: 5 TABLET, EXTENDED RELEASE ORAL at 08:11

## 2022-01-01 RX ADMIN — METOPROLOL TARTRATE 5 MG: 1 INJECTION, SOLUTION INTRAVENOUS at 09:11

## 2022-01-01 RX ADMIN — ETOMIDATE 14 MG: 2 INJECTION INTRAVENOUS at 11:11

## 2022-01-01 RX ADMIN — POLYETHYLENE GLYCOL 3350 17 G: 17 POWDER, FOR SOLUTION ORAL at 08:11

## 2022-01-01 RX ADMIN — LEVOTHYROXINE SODIUM 75 MCG: 0.03 TABLET ORAL at 06:11

## 2022-01-01 RX ADMIN — INSULIN ASPART 3 UNITS: 100 INJECTION, SOLUTION INTRAVENOUS; SUBCUTANEOUS at 12:11

## 2022-01-01 RX ADMIN — INSULIN ASPART 8 UNITS: 100 INJECTION, SOLUTION INTRAVENOUS; SUBCUTANEOUS at 04:11

## 2022-01-01 RX ADMIN — DOXYCYCLINE HYCLATE 100 MG: 100 CAPSULE ORAL at 07:11

## 2022-01-01 RX ADMIN — MUPIROCIN 1 G: 20 OINTMENT TOPICAL at 08:11

## 2022-01-01 RX ADMIN — POTASSIUM CHLORIDE 40 MEQ: 1500 TABLET, EXTENDED RELEASE ORAL at 03:11

## 2022-01-01 RX ADMIN — MAGNESIUM SULFATE HEPTAHYDRATE 2 G: 40 INJECTION, SOLUTION INTRAVENOUS at 05:11

## 2022-01-01 RX ADMIN — NOREPINEPHRINE BITARTRATE 0.02 MCG/KG/MIN: 4 INJECTION, SOLUTION INTRAVENOUS at 08:11

## 2022-01-01 RX ADMIN — Medication 120 MG: at 11:11

## 2022-01-01 RX ADMIN — ROCURONIUM BROMIDE 30 MG: 10 INJECTION, SOLUTION INTRAVENOUS at 11:11

## 2022-01-01 RX ADMIN — CYANOCOBALAMIN 1000 MCG: 1000 INJECTION, SOLUTION INTRAMUSCULAR at 01:07

## 2022-01-01 RX ADMIN — INSULIN ASPART 2 UNITS: 100 INJECTION, SOLUTION INTRAVENOUS; SUBCUTANEOUS at 04:11

## 2022-01-01 RX ADMIN — PROPOFOL 5 MCG/KG/MIN: 10 INJECTION, EMULSION INTRAVENOUS at 09:11

## 2022-01-01 RX ADMIN — CYANOCOBALAMIN 1000 MCG: 1000 INJECTION, SOLUTION INTRAMUSCULAR at 03:05

## 2022-01-01 RX ADMIN — ACETAMINOPHEN 650 MG: 325 TABLET ORAL at 12:11

## 2022-01-01 RX ADMIN — INSULIN ASPART 8 UNITS: 100 INJECTION, SOLUTION INTRAVENOUS; SUBCUTANEOUS at 12:11

## 2022-01-01 RX ADMIN — PANTOPRAZOLE SODIUM 40 MG: 40 TABLET, DELAYED RELEASE ORAL at 06:11

## 2022-01-01 RX ADMIN — ASPIRIN 81 MG CHEWABLE TABLET 324 MG: 81 TABLET CHEWABLE at 05:11

## 2022-01-01 RX ADMIN — CYANOCOBALAMIN 1000 MCG: 1000 INJECTION, SOLUTION INTRAMUSCULAR; SUBCUTANEOUS at 01:03

## 2022-01-01 RX ADMIN — INSULIN DETEMIR 10 UNITS: 100 INJECTION, SOLUTION SUBCUTANEOUS at 11:11

## 2022-01-01 RX ADMIN — MORPHINE SULFATE 2 MG: 2 INJECTION, SOLUTION INTRAMUSCULAR; INTRAVENOUS at 04:11

## 2022-01-01 RX ADMIN — ASPIRIN 325 MG: 325 TABLET, COATED ORAL at 08:11

## 2022-01-01 RX ADMIN — MAGNESIUM SULFATE HEPTAHYDRATE 2 G: 40 INJECTION, SOLUTION INTRAVENOUS at 12:11

## 2022-01-01 RX ADMIN — MAGNESIUM SULFATE HEPTAHYDRATE 2 G: 40 INJECTION, SOLUTION INTRAVENOUS at 09:11

## 2022-01-01 RX ADMIN — INSULIN ASPART 4 UNITS: 100 INJECTION, SOLUTION INTRAVENOUS; SUBCUTANEOUS at 08:11

## 2022-01-01 RX ADMIN — INSULIN DETEMIR 10 UNITS: 100 INJECTION, SOLUTION SUBCUTANEOUS at 08:11

## 2022-01-01 RX ADMIN — SODIUM CHLORIDE, SODIUM LACTATE, POTASSIUM CHLORIDE, AND CALCIUM CHLORIDE: .6; .31; .03; .02 INJECTION, SOLUTION INTRAVENOUS at 12:11

## 2022-01-01 RX ADMIN — CYANOCOBALAMIN 1000 MCG: 1000 INJECTION, SOLUTION INTRAMUSCULAR at 01:06

## 2022-01-01 RX ADMIN — POTASSIUM, SODIUM PHOSPHATES 280 MG-160 MG-250 MG ORAL POWDER PACKET 2 PACKET: POWDER IN PACKET at 11:11

## 2022-01-01 RX ADMIN — CEFTRIAXONE 2 G: 2 INJECTION, SOLUTION INTRAVENOUS at 12:11

## 2022-01-01 RX ADMIN — CYANOCOBALAMIN 1000 MCG: 1000 INJECTION, SOLUTION INTRAMUSCULAR; SUBCUTANEOUS at 10:01

## 2022-01-02 NOTE — PROGRESS NOTES
[facility] Hematology/Oncology  PROGRESS NOTE - Follow-up Visit      Subjective:       Patient ID:   NAME: Bret Garcia : 1945     76 y.o. male    Referring Doc: Bailey  Other Physicians: Kevin Ramos Romano, Finger    Chief Complaint:  Colon ca f/u    History of Present Illness:     Patient is being seen today in person and in clinic for a regularly scheduled visit;  He is here with his wife.     He had repeat scopes with Dr Ramos in 2021 and he saw again in 2021 and plans to see him again in Oct/2022; he found ten small polyps per patient     The patient is doing ok. No new issues. He denies any CP, SOB, HA's or N/V. He reports that his bowels normal.         Discussed COVID19 precautions. - he had his vaccinations            ROS:   GEN: normal without any fever, night sweats or weight loss  HEENT: normal with no HA's, sore throat, stiff neck, changes in vision  CV: normal with no CP, SOB, PND, THURMAN or orthopnea  PULM: normal with no SOB, cough, hemoptysis, sputum or pleuritic pain  GI: normal with no abdominal pain, nausea, vomiting, constipation, diarrhea, melanotic stools, BRBPR, or hematemesis  : normal with no hematuria, dysuria  BREAST: normal with no mass, discharge, pain  SKIN: normal with no rash, erythema, bruising, or swelling    Allergies:  Review of patient's allergies indicates:   Allergen Reactions    Codeine Other (See Comments)     Other reaction(s): Rash  hallucinations    Penicillins Other (See Comments)     Other reaction(s): Shortness of breath  Other reaction(s): Itching  Unknown/as a child       Medications:    Current Outpatient Medications:     ACCU-CHEK LOVE PLUS TEST STRP Strp, TEST BLOOD GLUCOSE EVERY DAY, Disp: , Rfl:     aspirin (ECOTRIN) 81 MG EC tablet, Take 1 tablet (81 mg total) by mouth once daily., Disp: 90 tablet, Rfl: 0    atorvastatin (LIPITOR) 40 MG tablet, Take 1 tablet (40 mg total) by mouth once daily., Disp: 90 tablet, Rfl: 3    benazepriL  "(LOTENSIN) 20 MG tablet, TAKE 1 TABLET BY MOUTH EVERY DAY, Disp: 90 tablet, Rfl: 1    DULoxetine (CYMBALTA) 30 MG capsule, TAKE 1 CAPSULE BY MOUTH EVERY DAY IN THE MORNING, Disp: 90 capsule, Rfl: 1    DULoxetine (CYMBALTA) 60 MG capsule, TAKE 1 CAPSULE BY MOUTH EVERY NIGHT, Disp: 90 capsule, Rfl: 1    ibuprofen (ADVIL,MOTRIN) 600 MG tablet, Take 1 tablet (600 mg total) by mouth every 6 (six) hours as needed for Pain., Disp: 30 tablet, Rfl: 0    levothyroxine (SYNTHROID) 75 MCG tablet, Take 1 tablet (75 mcg total) by mouth before breakfast., Disp: 90 tablet, Rfl: 0    metFORMIN (GLUCOPHAGE-XR) 750 MG ER 24hr tablet, Take 1 tablet (750 mg total) by mouth daily with breakfast. (Patient taking differently: Take 500 mg by mouth daily with breakfast.), Disp: 30 tablet, Rfl: 11    mirabegron (MYRBETRIQ) 50 mg Tb24, TAKE 1 TABLET BY MOUTH EVERY DAY, Disp: 30 tablet, Rfl: 11    OLANZapine (ZYPREXA) 5 MG tablet, TAKE 1 TABLET BY MOUTH EVERY DAY, Disp: 90 tablet, Rfl: 1    omeprazole (PRILOSEC) 40 MG capsule, Take 1 capsule (40 mg total) by mouth once daily., Disp: 90 capsule, Rfl: 1    OZEMPIC 1 mg/dose (4 mg/3 mL), INJECT 1 MG INTO THE SKIN EVERY 7 DAYS., Disp: , Rfl:     polyethylene glycol (GLYCOLAX) 17 gram/dose powder, polyethylene glycol 3350 17 gram/dose oral powder  MIX 17 GRAMS WITH 4 TO 8 OUNCES FLUID AND TAKE DAILY, Disp: , Rfl:     pregabalin (LYRICA) 75 MG capsule, Take 150 mg by mouth every evening., Disp: , Rfl:     Current Facility-Administered Medications:     cyanocobalamin injection 1,000 mcg, 1,000 mcg, Subcutaneous, Q30 Days, Robert Bonilla MD, 1,000 mcg at 10/25/21 1023    PMHx/PSHx Updates:  See patient's last visit with me on  5/24/2021  See H&P on Vol #1        Pathology:  See vol #1        Objective:     Vitals:  Blood pressure 123/82, pulse 90, temperature 97.6 °F (36.4 °C), resp. rate 18, height 5' 4" (1.626 m), weight 76.7 kg (169 lb).        Physical Examination:   GEN: no " apparent distress, comfortable; AAOx3; overweight  HEAD: atraumatic and normocephalic  EYES: no conjunctival pallor or muddiness, no icterus; normal pupil reaction to ambient light  ENT: OMM, no pharyngeal erythema, external bilateral ears WNL; no visible thrush or ulcers  NECK: no masses or swelling, trachea midline, no visible LAD/LN's   CV: no palpitations; no pedal edema; no noticeable JVD or neck vein distension  CHEST: Normal respiratory effort; chest wall breath movements symmetrical; no audible wheezing  ABDOM: non-distended; no bloating  MUSC/Skeletal: ROM normal; joints visibly normal; no deformities or arthropathy  EXTREM: no clubbing, cyanosis, inflammation or swelling  SKIN: no rashes, lesions, ulcers, petechiae or subcutaneous nodules  : no lawson  NEURO: moving all 4 extremities; AAOx3; no tremors  PSYCH: normal mood, affect and behavior  LYMPH: no visible LN's or LAD              Labs:          Lab Results   Component Value Date    WBC 5.48 12/14/2021    HGB 11.5 (L) 12/14/2021    HCT 34.6 (L) 12/14/2021    MCV 91 12/14/2021     12/14/2021     BMP  Lab Results   Component Value Date     (L) 12/14/2021    K 4.4 12/14/2021    CL 98 12/14/2021    CO2 29 12/14/2021    BUN 13 12/14/2021    CREATININE 1.2 12/14/2021    CALCIUM 9.4 12/14/2021    ANIONGAP 8 12/14/2021    ESTGFRAFRICA >60.0 12/14/2021    EGFRNONAA 58.8 (A) 12/14/2021     Lab Results   Component Value Date    ALT 16 12/14/2021    AST 20 12/14/2021    ALKPHOS 69 12/14/2021    BILITOT 0.8 12/14/2021     Lab Results   Component Value Date    CEA 3.5 12/14/2021       Lab Results   Component Value Date    IRON 50 12/14/2021    TIBC 283 12/14/2021    FERRITIN 67 12/14/2021     Lab Results   Component Value Date    QNWCNJIF19 402 12/14/2021     Lab Results   Component Value Date    FOLATE >24.8 (H) 12/14/2021       Radiology/Diagnostic Studies:    CT scans  6/24/2021:    IMPRESSION:  No CT findings to suggest residual/recurrent or  metastatic disease.      PET 12/17/2020:     Impression:     1. No suspicious FDG hypermetabolism to suggest recurrent neoplasm or metastatic disease, specifically no abnormal focal FDG hypermetabolism to correlate with the right middle lobe nodular opacity described on recent CT of 12/01/2020.  In review of that CT, the opacity is most consistent with focal atelectasis or scarring, and is of very low suspicion for pulmonary nodule or neoplasm.  A follow-up noncontrast chest CT in 6 months could be performed to document stability.  2. Additional observations as above.         CXR 4/11/2020:    Impression       Mild left lung base linear densities are unchanged from previous exam.             CXR  3/20/2019:    IMPRESSION:    Unchanged mild elevation of the left hemidiaphragm.    Basilar platelike atelectasis or scarring.            11/8/2017  CT abdom:  Impression       1.  Acute, partial small bowel obstruction with a relative zone of transition in the mid abdomen near the site of a small, midline ventral abdominal hernia with herniation of a short segment of small bowel located just inferior to the patient's ventral abdominal mesh. No signs of strangulation noted. There is a moderate volume of colonic fecal material and gas noted.    2. Additional findings:  -Prior prostatectomy and artificial urinary sphincter placement.  -Probable prior right hemicolectomy.  -Mild left basilar atelectasis and elevated left hemidiaphragm.         I have reviewed all available lab results and radiology reports.    Assessment/Plan:   (1) 76 y.o. male with diagnosis of colon cancer in 2005  - followed by Dr Ramos with GI with recent colonoscopy on Aug 6th 2018 with two polyps removed  - prior partial SBO in Nov 2016  - latest CEA is at 3.3    5/24/2021:  - doing ok  - latest CEA was 3.4  - Prior PET was in Dec 2020  - he had repeat scope with Dr Ramos in Feb 2021 and sees him again in Aug 2021  - due for repeat Ct's this  [place]    1/3/2022:  - he had repeat scope with Dr Ramos in Nov 2021 with 10 small polypps  - he is seeing him again in oct/Nov 2022  - CEA was 3.5  - CT scans in June 2021 were good      (2) Prior pseudotumor of lung - s/p resection   - followed by Dr Brown with Pulm    (3) Prior prostate CA - followed by Dr Urbina with     (4) Steatosis of liver with chronic LFT elevations - also followed by Dr Ramos with GI  - he had an SBO in Nov 2017 and was hospitalized at [facility]    (5) Multifactorial mild anemia - chronic disease, chronic renal and iron deficiency components  - latest hgb adequate at 10.4  - iron and ferritin good    5/24/2021:  - latest hgb was 11.0  - iron was adequate    1/3/2022:  - latest hgb stable at 11.5    (6) Mild B12 deficiency -  B12 monthly previously recommended with last one in Nov 2021    (7) CRI - followed by Dr Patton    (8) S/p left rotator cuff in March 2018 with Dr Moura    (9) recent rib fractures due to fall    1. History of colon cancer     2. Anemia, chronic disease     3. History of prostate cancer     4. Other iron deficiency anemia     5. B12 deficiency             PLAN:  1. Continue b12 monthly  2. Continue to check basic labs and CEA q 6 months  - encourage compliance  3. F/U with PCP, GI, Pulm and   4. F/u with GI again in Oct/Nov 2022  5. RTC in 6 months    Fax note to He (new PCP), Carlitos Patton [place], Richard    Total Time spent on patient:    I spent over 25 mins of time with the patient. Reviewed results of the recently ordered labs, tests, reports and studies; made directives with regards to the results. Over half of this time was spent couseling and coordinating care, making treatment and analytical decisions; ordering necessary labs, tests and studies; and discussing treatment options and setting up treatment plan(s) if indicated.          Discussion:       COVID-19 Discussion:    I had long discussion with patient and any applicable family about the  COVID-19 coronavirus epidemic and the recommended precautions with regard to cancer and/or hematology patients. I have re-iterated the CDC recommendations for adequate hand washing, use of hand -like products, and coughing into elbow, etc. In addition, especially for our patients who are on chemotherapy and/or our otherwise immunocompromised patients, I have recommended avoidance of crowds, including movie theaters, restaurants, churches, etc. I have recommended avoidance of any sick or symptomatic family members and/or friends. I have also recommended avoidance of any raw and unwashed food products, and general avoidance of food items that have not been prepared by themselves. The patient has been asked to call us immediately with any symptom developments, issues, questions or other general concerns.          I have explained all of the above in detail and the patient understands all of the current recommendation(s). I have answered all of their questions to the best of my ability and to their complete satisfaction.   The patient is to continue with the current management plan.            Electronically signed by Robert Bonilla MD

## 2022-01-31 PROBLEM — J18.9 PNEUMONIA: Status: RESOLVED | Noted: 2020-04-07 | Resolved: 2022-01-01

## 2022-01-31 PROBLEM — Z20.822 SUSPECTED COVID-19 VIRUS INFECTION: Status: RESOLVED | Noted: 2020-04-07 | Resolved: 2022-01-01

## 2022-01-31 PROBLEM — F31.9 BIPOLAR DISORDER: Status: ACTIVE | Noted: 2022-01-01

## 2022-01-31 PROBLEM — K56.609 SBO (SMALL BOWEL OBSTRUCTION): Status: RESOLVED | Noted: 2017-11-07 | Resolved: 2022-01-01

## 2022-01-31 PROBLEM — G31.84 MCI (MILD COGNITIVE IMPAIRMENT) WITH MEMORY LOSS: Status: ACTIVE | Noted: 2022-01-01

## 2022-01-31 NOTE — ASSESSMENT & PLAN NOTE
Obtain MRI brain, NP testing and serologies now.   Request records from previous neurologist for review.     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 Etiology is unclear at this point. No overt PDism. Vascular RF include DM, HTN, HLD. Discussed continued efforts to maximize control of these via primary are. On appropriate medical therapies.     Safety discussed - no concerns in the home per wife, agree that he should not be driving based on history and exam today.     Sleep hygiene and habits reviewed -- info given to patient and wife. Seems to be getting enough total sleep, but sleep is interrupted. Could contribute to cognitive status, consider sleep testing in the future.     Also recommend that he re-establish with psychiatry for medication management / stabilization given BPD history. Referral placed.

## 2022-01-31 NOTE — TELEPHONE ENCOUNTER
----- Message from Kacie Renee NP sent at 1/31/2022  4:08 PM CST -----  See referral to psychiatry in [place]  Records from neurology Dr. Scott please

## 2022-01-31 NOTE — PATIENT INSTRUCTIONS
"Mild cognitive impairment (MCI) is defined as the transitional state between the cognitive changes of normal aging and the fully developed features of dementia.   Some people with MCI experience little to no disease progression while others may progressively decline. Unfortunately, the clinical progression is unpredictable.     We will obtain some basic labs to rule out any reversible causes for your memory loss, such as a nutritional deficiency. We will also obtain some baseline neuroimaging to look at the brain structure itself. I have also referred you for a more in-depth cognitive assessment with our neuropyschologist. Please be aware that this process may take a few months to complete. They will call you to schedule it.     Certain medications have been shown to slow the clinical progression of MCI in some people. It is important to understand that these medications cannot reverse any pre-existing process. We can consider this at your follow up.     In the setting of MCI, it is very important to work diligently to keep the blood vessels healthy to prevent any worsening. You should continue to work closely with your PCP to control your blood pressure, cholesterol and blood sugar. The Mediteranean diet is also recommended to promote overall vascular health.     I also think that your poor sleep may be contributing. You need to try to stay awake during the day and and get yourself back on a regular day night schedule. You can try adding over the counter extended release melatonin 5 - 10 mg before bedtime to see if this helps.     Healthy Sleep Habits:   Keep a consistent schedule with a set wake time and a set bed time   Try to set a bedtime that will allow you to get at least 7 hours of sleep   Your bed should be reserved for the "3 S's" - Sleep, Sex and Sickness   Establish a relaxing bedtime routine - listen to soft music, do some light stretches, read, etc.- in the 30 minutes before bed   Limit exposure " "to bright light in the evening, as this can limit your brain's natural production of melatonin, the "sleep hormone"   Unplug from electronics at least 30 minutes before bed -- cell phones, laptops and tablets emit blue light and cause brain stimulation that may interfere with melatonin production and relaxation   Avoid consuming caffeine in the late afternoon/evening   Avoid consuming alcohol before bedtime - alcohol may help you fall asleep, but can actually disrupt normal sleep cycles during the night   Don't toss and turn - It helps to establish a "mental" connection between being in bed and actually being asleep. If you don't fall asleep after being in bed for 20 minutes, get out of bed. Try to do something that will help relax you before trying to fall asleep again.    Try to avoid napping during the day, as naps can affect how you much sleep you get at night        We will see you back in about 3 months after the testing for a follow up with one of our neurologists.     "
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
## 2022-02-01 NOTE — PLAN OF CARE
Problem: Fall Injury Risk  Goal: Absence of Fall and Fall-Related Injury  Outcome: Ongoing, Progressing  Intervention: Identify and Manage Contributors  Flowsheets (Taken 2/1/2022 1057)  Self-Care Promotion: independence encouraged  Medication Review/Management: medications reviewed  Intervention: Promote Injury-Free Environment  Flowsheets (Taken 2/1/2022 1057)  Safety Promotion/Fall Prevention: medications reviewed

## 2022-02-03 NOTE — TELEPHONE ENCOUNTER
Medical records received from [facility]     1 COPY MADE  copy placed in Kacie's office.  ORIGINAL SENT TO SCANNING

## 2022-03-28 NOTE — PROGRESS NOTES
SUBJECTIVE:    Patient ID: Bret Garcia is a 76 y.o. male.    Chief Complaint: Diabetes  77 yo male, here today to follow up on his chronic medical conditions at last visit his A1C has slightly decreased, his blood pressure is well controlled with lipids are controlled.  According to his wife he has followed up with Neurology she is requesting a referral to a different neurologist to following up for his memory loss.  Patient is followed up with Neurology, they have recommended he see his psychiatrist.    Patient has no new complaints today he denies any chest pain shortness of breath, dyspnea on exertion.  Patient performs very little activity during the day, we discussed increasing his activity slowly by adding walking using his walker or cane for stability.     Significant past medical history:  Hyperlipidemia: Atorvastatin 40mg  Hypertension:  Benazepril  20 mg,   DM:  Ozempic 1mg weekly, Metformin XR 750mg ( A1c 7.8)   Hypothyrpoidism: Levothyroxine 75  OCD: Cymbalta 30mg/60mg, Zyprexa 5 mg  GERD: Omeprazole 40mg  Hx of prostate cancer:   Bladder leakage Sphincter replacement:  Mirabegron 50 mg  Hx of Colon Cancer:  Followed by GI, and Oncology  Neuropathy:  Lyrica 75 mg     Specialists  Urologist: Luz  Gastroenterology: Dr Ramos  Heme/Onc: Dr Bonilla  Ophtho: Dr Laboy  Neuro: Dr Kapoor   Nephrology: Dr Medina     Smoke:None  ETOH: None  Exercise: None      Hemoglobin A1C 4.5 - 6.2 % 7.8 High        Diabetes  He presents for his follow-up diabetic visit. He has type 2 diabetes mellitus. His disease course has been stable. There are no hypoglycemic associated symptoms. Pertinent negatives for hypoglycemia include no confusion, dizziness, headaches, hunger, mood changes, nervousness/anxiousness, pallor, seizures, sleepiness, speech difficulty, sweats or tremors. Associated symptoms include fatigue and weight loss. Pertinent negatives for diabetes include no blurred vision, no chest pain, no foot  paresthesias, no foot ulcerations, no polydipsia, no polyphagia, no polyuria, no visual change and no weakness. There are no hypoglycemic complications. Symptoms are stable. Risk factors for coronary artery disease include diabetes mellitus, dyslipidemia, hypertension, male sex, obesity and sedentary lifestyle. Current diabetic treatment includes oral agent (dual therapy). He is compliant with treatment all of the time. He is following a generally healthy diet. He never participates in exercise.   Hypertension  This is a chronic problem. The current episode started more than 1 year ago. The problem is unchanged. The problem is controlled. Pertinent negatives include no blurred vision, chest pain, headaches, neck pain, palpitations, shortness of breath or sweats. Risk factors for coronary artery disease include sedentary lifestyle, male gender, obesity, diabetes mellitus and dyslipidemia. Past treatments include ACE inhibitors. The current treatment provides moderate improvement. Compliance problems include exercise and diet.    Hyperlipidemia  This is a chronic problem. The current episode started more than 1 year ago. The problem is controlled. Recent lipid tests were reviewed and are normal. Exacerbating diseases include diabetes and obesity. Pertinent negatives include no chest pain or shortness of breath. Current antihyperlipidemic treatment includes statins.         Past Medical History:   Diagnosis Date    Colon polyp     Diabetes mellitus     Elevated PSA     GERD (gastroesophageal reflux disease)     Hyperlipidemia     Hypertension     Incontinence of urine     Neuropathy     Personal history of malignant neoplasm of large intestine     colon; prostate    Personal history of malignant neoplasm of prostate     Pneumonia 4/7/2020    SBO (small bowel obstruction) 11/7/2017    Suspected COVID-19 virus infection 4/7/2020     Social History     Socioeconomic History    Marital status:     Occupational History    Occupation: retired   Tobacco Use    Smoking status: Never Smoker    Smokeless tobacco: Never Used   Substance and Sexual Activity    Alcohol use: No    Drug use: Not Currently    Sexual activity: Yes     Past Surgical History:   Procedure Laterality Date    APPENDECTOMY      COLON SURGERY  2005    resection    PROSTATE SURGERY  2006    prostatectomy    ROTATOR CUFF REPAIR      Rt shoulder    SHOULDER SURGERY      TONSILLECTOMY, ADENOIDECTOMY      as a baby     Family History   Problem Relation Age of Onset    Heart disease Father     Alcohol abuse Father      Current Outpatient Medications   Medication Sig Dispense Refill    ACCU-CHEK LOVE PLUS TEST STRP Strp TEST BLOOD GLUCOSE EVERY DAY      aspirin (ECOTRIN) 81 MG EC tablet Take 1 tablet (81 mg total) by mouth once daily. 90 tablet 0    atorvastatin (LIPITOR) 40 MG tablet Take 1 tablet (40 mg total) by mouth once daily. 90 tablet 3    benazepriL (LOTENSIN) 20 MG tablet TAKE 1 TABLET BY MOUTH EVERY DAY 90 tablet 1    DULoxetine (CYMBALTA) 30 MG capsule TAKE 1 CAPSULE BY MOUTH EVERY DAY IN THE MORNING 90 capsule 1    DULoxetine (CYMBALTA) 60 MG capsule TAKE 1 CAPSULE BY MOUTH EVERY NIGHT 90 capsule 1    levothyroxine (SYNTHROID) 75 MCG tablet Take 1 tablet (75 mcg total) by mouth before breakfast. 90 tablet 0    metFORMIN (GLUCOPHAGE-XR) 750 MG ER 24hr tablet Take 1 tablet (750 mg total) by mouth daily with breakfast. (Patient taking differently: Take 500 mg by mouth daily with breakfast.) 30 tablet 11    OLANZapine (ZYPREXA) 5 MG tablet TAKE 1 TABLET BY MOUTH EVERY DAY 90 tablet 1    omeprazole (PRILOSEC) 40 MG capsule Take 1 capsule (40 mg total) by mouth once daily. 90 capsule 1    OZEMPIC 1 mg/dose (4 mg/3 mL) INJECT 1 MG INTO THE SKIN EVERY 7 DAYS.      polyethylene glycol (GLYCOLAX) 17 gram/dose powder polyethylene glycol 3350 17 gram/dose oral powder   MIX 17 GRAMS WITH 4 TO 8 OUNCES FLUID AND TAKE DAILY       mirabegron (MYRBETRIQ) 50 mg Tb24 Take 1 tablet (50 mg total) by mouth once daily. 30 tablet 11    pregabalin (LYRICA) 75 MG capsule Take 2 capsules (150 mg total) by mouth every evening. 90 capsule 1     Current Facility-Administered Medications   Medication Dose Route Frequency Provider Last Rate Last Admin    cyanocobalamin injection 1,000 mcg  1,000 mcg Subcutaneous Q30 Days Robert Bonilla MD   1,000 mcg at 01/03/22 1004     Facility-Administered Medications Ordered in Other Visits   Medication Dose Route Frequency Provider Last Rate Last Admin    cyanocobalamin injection 1,000 mcg  1,000 mcg Intramuscular Q30 Days ANDREE Wolf MD   1,000 mcg at 03/28/22 1341     Review of patient's allergies indicates:   Allergen Reactions    Codeine Other (See Comments) and Hallucinations     Other reaction(s): Rash  hallucinations    Penicillin     Penicillins Other (See Comments)     Other reaction(s): Shortness of breath  Other reaction(s): Itching  Unknown/as a child       Review of Systems   Constitutional: Positive for fatigue and weight loss. Negative for activity change, chills, diaphoresis and unexpected weight change.   HENT: Negative for congestion, hearing loss, rhinorrhea, sinus pressure, sinus pain and trouble swallowing.    Eyes: Negative for blurred vision, discharge and visual disturbance.   Respiratory: Negative for cough, chest tightness, shortness of breath and wheezing.    Cardiovascular: Negative for chest pain and palpitations.   Gastrointestinal: Negative for blood in stool, constipation, diarrhea and vomiting.   Endocrine: Negative for polydipsia, polyphagia and polyuria.   Genitourinary: Negative for difficulty urinating, frequency, hematuria and urgency.        Overflow incontinence   Musculoskeletal: Negative for arthralgias, joint swelling and neck pain.   Skin: Negative for pallor.   Neurological: Negative for dizziness, tremors, seizures, speech difficulty, weakness and  "headaches.   Psychiatric/Behavioral: Negative for confusion and dysphoric mood. The patient is not nervous/anxious.           Blood pressure 120/76, pulse 91, temperature 98.7 °F (37.1 °C), temperature source Oral, height 5' 4" (1.626 m), weight 73.9 kg (162 lb 14.4 oz), SpO2 97 %. Body mass index is 27.96 kg/m².   Objective:      Physical Exam  Vitals reviewed.   Constitutional:       General: He is not in acute distress.     Appearance: Normal appearance. He is not ill-appearing or toxic-appearing.   HENT:      Head: Normocephalic and atraumatic.      Right Ear: Tympanic membrane normal.      Left Ear: Tympanic membrane normal.      Nose: Nose normal. No congestion or rhinorrhea.      Mouth/Throat:      Mouth: Mucous membranes are moist.      Pharynx: Oropharynx is clear. No oropharyngeal exudate or posterior oropharyngeal erythema.   Cardiovascular:      Rate and Rhythm: Normal rate and regular rhythm.      Heart sounds: Normal heart sounds. No murmur heard.  Pulmonary:      Effort: Pulmonary effort is normal. No respiratory distress.      Breath sounds: Normal breath sounds. No wheezing.   Musculoskeletal:      Right lower leg: Edema present.      Left lower leg: Edema present.      Comments: Evidence of veinous insufficiency with hemosiderin staining.   Skin:     General: Skin is warm and dry.      Capillary Refill: Capillary refill takes less than 2 seconds.   Neurological:      Mental Status: He is alert.             Assessment:       1. Type 2 diabetes mellitus with obesity    2. Other hyperlipidemia    3. Hypertension, unspecified type    4. Chronic bilateral low back pain without sciatica    5. Continuous leakage of urine         Plan:           Type 2 diabetes mellitus with obesity  -     Hemoglobin A1C; Future; Expected date: 03/28/2022  Will get A1c and adjust medications as needed  Other hyperlipidemia  -     Lipid Panel; Future; Expected date: 03/28/2022    Hypertension, unspecified type  Blood " pressure well controlled will continue on current medications  Chronic bilateral low back pain without sciatica  -     pregabalin (LYRICA) 75 MG capsule; Take 2 capsules (150 mg total) by mouth every evening.  Dispense: 90 capsule; Refill: 1    Continuous leakage of urine  -     mirabegron (MYRBETRIQ) 50 mg Tb24; Take 1 tablet (50 mg total) by mouth once daily.  Dispense: 30 tablet; Refill: 11  Patient instructed on how to complete kegal exercises asked in the complete several rounds of kegal exercises 3 times a day along with timed voiding patient instructed to void every hour on the hour while awake.

## 2022-04-26 NOTE — PLAN OF CARE
Problem: Fatigue  Goal: Improved Activity Tolerance  Intervention: Promote Improved Energy  Flowsheets (Taken 4/26/2022 6601)  Fatigue Management: fatigue-related activity identified  Activity Management: Ambulated -L4

## 2022-07-18 NOTE — PROGRESS NOTES
[facility] Hematology/Oncology  PROGRESS NOTE - Follow-up Visit      Subjective:       Patient ID:   NAME: Bret Garcia : 1945     76 y.o. male    Referring Doc: Bailey  Other Physicians: Kevin Ramos Romano, Finger    Chief Complaint:  Colon ca f/u    History of Present Illness:     Patient is being seen today in person and in clinic for a regularly scheduled visit; He is here with his wife.       He saw Dr Cutler in 2022       The patient is doing ok. No new issues. He denies any CP, SOB, HA's or N/V. He reports that his bowels normal.     He sees Dr Ramos next month        Discussed COVID19 precautions. - he had his vaccinations            ROS:   GEN: normal without any fever, night sweats or weight loss  HEENT: normal with no HA's, sore throat, stiff neck, changes in vision  CV: normal with no CP, SOB, PND, THURMAN or orthopnea  PULM: normal with no SOB, cough, hemoptysis, sputum or pleuritic pain  GI: normal with no abdominal pain, nausea, vomiting, constipation, diarrhea, melanotic stools, BRBPR, or hematemesis  : normal with no hematuria, dysuria  BREAST: normal with no mass, discharge, pain  SKIN: normal with no rash, erythema, bruising, or swelling    Allergies:  Review of patient's allergies indicates:   Allergen Reactions    Codeine Other (See Comments)     Other reaction(s): Rash  hallucinations    Penicillins Other (See Comments)     Other reaction(s): Shortness of breath  Other reaction(s): Itching  Unknown/as a child       Medications:    Current Outpatient Medications:     ACCU-CHEK LOVE PLUS TEST STRP Strp, TEST BLOOD GLUCOSE EVERY DAY, Disp: , Rfl:     aspirin (ECOTRIN) 81 MG EC tablet, Take 1 tablet (81 mg total) by mouth once daily., Disp: 90 tablet, Rfl: 0    atorvastatin (LIPITOR) 40 MG tablet, TAKE 1 TABLET BY MOUTH EVERY DAY, Disp: 90 tablet, Rfl: 3    benazepriL (LOTENSIN) 20 MG tablet, TAKE 1 TABLET BY MOUTH EVERY DAY, Disp: 90 tablet, Rfl: 1    DULoxetine (CYMBALTA)  "30 MG capsule, TAKE 1 CAPSULE BY MOUTH EVERY DAY IN THE MORNING, Disp: 90 capsule, Rfl: 1    DULoxetine (CYMBALTA) 60 MG capsule, TAKE 1 CAPSULE BY MOUTH EVERY DAY AT NIGHT, Disp: 90 capsule, Rfl: 1    levothyroxine (SYNTHROID) 75 MCG tablet, TAKE 1 TABLET BY MOUTH EVERY DAY BEFORE BREAKFAST, Disp: 90 tablet, Rfl: 0    mirabegron (MYRBETRIQ) 50 mg Tb24, Take 1 tablet (50 mg total) by mouth once daily., Disp: 30 tablet, Rfl: 11    OLANZapine (ZYPREXA) 5 MG tablet, TAKE 1 TABLET BY MOUTH EVERY DAY, Disp: 90 tablet, Rfl: 1    omeprazole (PRILOSEC) 40 MG capsule, Take 1 capsule (40 mg total) by mouth once daily., Disp: 90 capsule, Rfl: 1    OZEMPIC 1 mg/dose (4 mg/3 mL), INJECT 1 MG INTO THE SKIN EVERY 7 DAYS., Disp: , Rfl:     polyethylene glycol (GLYCOLAX) 17 gram/dose powder, polyethylene glycol 3350 17 gram/dose oral powder  MIX 17 GRAMS WITH 4 TO 8 OUNCES FLUID AND TAKE DAILY, Disp: , Rfl:     pregabalin (LYRICA) 75 MG capsule, TAKE 2 CAPSULES (150 MG TOTAL) BY MOUTH EVERY EVENING., Disp: 90 capsule, Rfl: 1    metFORMIN (GLUCOPHAGE-XR) 750 MG ER 24hr tablet, Take 1 tablet (750 mg total) by mouth daily with breakfast. (Patient taking differently: Take 500 mg by mouth daily with breakfast.), Disp: 30 tablet, Rfl: 11    PMHx/PSHx Updates:  See patient's last visit with me on 1/3/2022  See H&P on Vol #1        Pathology:  See vol #1        Objective:     Vitals:  Blood pressure 121/82, pulse 92, temperature 97.8 °F (36.6 °C), resp. rate 18, height 5' 4" (1.626 m), weight 74.8 kg (164 lb 12.8 oz).        Physical Examination:   GEN: no apparent distress, comfortable; AAOx3  HEAD: atraumatic and normocephalic  EYES: no conjunctival pallor or muddiness, no icterus; normal pupil reaction to ambient light  ENT: OMM, no pharyngeal erythema, external bilateral ears WNL; no visible thrush or ulcers  NECK: no masses or swelling, trachea midline, no visible LAD/LN's   CV: no palpitations; no pedal edema; no noticeable " JVD or neck vein distension  CHEST: Normal respiratory effort; chest wall breath movements symmetrical; no audible wheezing  ABDOM: non-distended; no bloating  MUSC/Skeletal: ROM normal; joints visibly normal; no deformities or arthropathy  EXTREM: no clubbing, cyanosis, inflammation or swelling  SKIN: no rashes, lesions, ulcers, petechiae or subcutaneous nodules  : no lawson  NEURO: moving all 4 extremities; AAOx3; no tremors  PSYCH: normal mood, affect and behavior  LYMPH: no visible LN's or LAD              Labs:          Lab Results   Component Value Date    WBC 5.48 12/14/2021    HGB 11.5 (L) 12/14/2021    HCT 34.6 (L) 12/14/2021    MCV 91 12/14/2021     12/14/2021     BMP  Lab Results   Component Value Date     (L) 12/14/2021    K 4.4 12/14/2021    CL 98 12/14/2021    CO2 29 12/14/2021    BUN 13 12/14/2021    CREATININE 1.2 12/14/2021    CALCIUM 9.4 12/14/2021    ANIONGAP 8 12/14/2021    ESTGFRAFRICA >60.0 12/14/2021    EGFRNONAA 58.8 (A) 12/14/2021     Lab Results   Component Value Date    ALT 16 12/14/2021    AST 20 12/14/2021    ALKPHOS 69 12/14/2021    BILITOT 0.8 12/14/2021     Lab Results   Component Value Date    CEA 3.5 12/14/2021       Lab Results   Component Value Date    IRON 50 12/14/2021    TIBC 283 12/14/2021    FERRITIN 67 12/14/2021     Lab Results   Component Value Date    UGKGOMRN71 402 12/14/2021     Lab Results   Component Value Date    FOLATE >24.8 (H) 12/14/2021       Radiology/Diagnostic Studies:    CT scans 6/24/2021:    IMPRESSION:  No CT findings to suggest residual/recurrent or metastatic disease.      PET 12/17/2020:     Impression:     1. No suspicious FDG hypermetabolism to suggest recurrent neoplasm or metastatic disease, specifically no abnormal focal FDG hypermetabolism to correlate with the right middle lobe nodular opacity described on recent CT of 12/01/2020.  In review of that CT, the opacity is most consistent with focal atelectasis or scarring, and is of  very low suspicion for pulmonary nodule or neoplasm.  A follow-up noncontrast chest CT in 6 months could be performed to document stability.  2. Additional observations as above.         CXR 4/11/2020:    Impression       Mild left lung base linear densities are unchanged from previous exam.             CXR  3/20/2019:    IMPRESSION:    Unchanged mild elevation of the left hemidiaphragm.    Basilar platelike atelectasis or scarring.            11/8/2017  CT abdom:  Impression       1.  Acute, partial small bowel obstruction with a relative zone of transition in the mid abdomen near the site of a small, midline ventral abdominal hernia with herniation of a short segment of small bowel located just inferior to the patient's ventral abdominal mesh. No signs of strangulation noted. There is a moderate volume of colonic fecal material and gas noted.    2. Additional findings:  -Prior prostatectomy and artificial urinary sphincter placement.  -Probable prior right hemicolectomy.  -Mild left basilar atelectasis and elevated left hemidiaphragm.         I have reviewed all available lab results and radiology reports.    Assessment/Plan:   (1) 76 y.o. 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   - followed by Dr Brown with Pulm    (3) Prior prostate CA - followed by Dr Urbina with     (4) Steatosis of liver with chronic LFT elevations - also followed by Dr Ramos with GI  - he had an SBO in Nov  2017 and was hospitalized at [facility]    (5) Multifactorial mild anemia - chronic disease, chronic renal and iron deficiency components  - latest hgb adequate at 10.4  - iron and ferritin good    5/24/2021:  - latest hgb was 11.0  - iron was adequate    7/19/2022:  - he is due for repeat labs  - he has been on B12 monthly    (6) Mild B12 deficiency      (7) CRI - followed by Dr Patton    (8) S/p left rotator cuff in March 2018 with Dr Moura    (9) Hx/of rib fractures due to fall    1. Other iron deficiency anemia     2. History of colon cancer     3. Anemia, chronic disease     4. B12 deficiency             PLAN:  1. Check up to date labs; B12 monthly  2. Continue to check basic labs and CEA q 6 months  - encourage compliance  3. F/U with PCP, GI, Pulm and   4. RTC in 12 months    Fax note to Pooja Culter, Kevin Urbina, Richard    Total Time spent on patient:    I spent over 25 mins of time with the patient. Reviewed results of the recently ordered labs, tests, reports and studies; made directives with regards to the results. Over half of this time was spent couseling and coordinating care, making treatment and analytical decisions; ordering necessary labs, tests and studies; and discussing treatment options and setting up treatment plan(s) if indicated.          Discussion:       COVID-19 Discussion:    I had long discussion with patient and any applicable family about the COVID-19 coronavirus epidemic and the recommended precautions with regard to cancer and/or hematology patients. I have re-iterated the CDC recommendations for adequate hand washing, use of hand -like products, and coughing into elbow, etc. In addition, especially for our patients who are on chemotherapy and/or our otherwise immunocompromised patients, I have recommended avoidance of crowds, including movie theaters, restaurants, churches, etc. I have recommended avoidance of any sick or symptomatic family members and/or friends. I  have also recommended avoidance of any raw and unwashed food products, and general avoidance of food items that have not been prepared by themselves. The patient has been asked to call us immediately with any symptom developments, issues, questions or other general concerns.          I have explained all of the above in detail and the patient understands all of the current recommendation(s). I have answered all of their questions to the best of my ability and to their complete satisfaction.   The patient is to continue with the current management plan.            Electronically signed by Robert Bonilla MD

## 2022-07-19 NOTE — PLAN OF CARE
Problem: Anemia  Goal: Anemia Symptom Improvement  Outcome: Ongoing, Progressing  Intervention: Monitor and Manage Anemia  Flowsheets (Taken 7/19/2022 1313)  Fatigue Management:   frequent rest breaks encouraged   paced activity encouraged

## 2022-08-01 NOTE — Clinical Note
Rodrigo Hester,  I saw Mr. Garcia for initial psych eval. We are going to reduce Zyprexa and look at changing his antidepressant in the future. I placed an order for BENITA eval as well. They would like f/u with neuro to see what next steps are regarding NP testing.   Thanks, Elizabeth
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
## 2022-08-01 NOTE — PATIENT INSTRUCTIONS
Please reduce Zyprexa to 2.5mg nightly     Please go to emergency department if feeling as though you are a harm to yourself or others or if you are in crisis. Please call the clinic to report any worsening of symptoms or problems associated with medication.

## 2022-08-11 NOTE — TELEPHONE ENCOUNTER
----- Message from Sujey Romero MA sent at 8/10/2022  3:39 PM CDT -----    ----- Message -----  From: Kacie Renee NP  Sent: 8/10/2022   8:52 AM CDT  To: Thelma BLEDSOE Staff    Pt contact us in March to inquire about NP testing... I do not see where he has been contacted yet. This was ordered in January. Please see about getting this done ASAP.     Agree with psychiatry's plan to titrate medications and pursue a sleep study.     Please contact pt / wife and offer our apologies for the delay in getting NP testing done. If he is having any issues, I would be happy to see him for follow up before the NP testing but otherwise, we can arrange for follow up after he has NP testing. He can see myself or MD and it will be a memory appt with ADLs  ----- Message -----  From: Elizabeth Chilel PA-C  Sent: 8/5/2022  12:07 PM CDT  To: JAY Jesus,    I saw Mr. Garcia for initial psych eval. We are going to reduce Zyprexa and look at changing his antidepressant in the future. I placed an order for BENITA eval as well. They would like f/u with neuro to see what next steps are regarding NP testing.     Thanks,  Elizabeth
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
## 2022-09-07 NOTE — PROGRESS NOTES
NEUROPSYCHOLOGY CONSULT (TELEHEALTH)    Referral Information  Name: Bret Garcia  MRN: [id number]  : 1945  Age: 77 y.o.  Race: White  Gender: male  Referring Provider: Kacie Renee Np  [address] [facility] [address]  [address] [place] [address]  Billing: See below for details as coding/billing has changed   Telemedicine:   The patient location is: [place], [place]  The provider location is: [place], [place]  The chief complaint leading to consultation/medical necessity is: Cognitive concerns  Visit type: Virtual visit with synchronous audio and video  Total time spent with patient: 45 minutes  Each patient to whom he or she provides medical services by telemedicine is:  (1) informed of the relationship between the physician and patient and the respective role of any other health care provider with respect to management of the patient; and (2) notified that he or she may decline to receive medical services by telemedicine and may withdraw from such care at any time.  Consent/Emergency Plan: The patient expressed an understanding of the purpose of the evaluation and consented to all procedures. I informed the patient of limits to confidentiality and discussed an emergency plan. He was accompanied to the visit by his wife.    SUMMARY/TREATMENT PLAN   Results from the interview indicate the following diagnoses and treatment plan recommendations. The patient is not likely able to follow a treatment plan without help from family.    Diagnoses/Plan:  Problem List Items Addressed This Visit          Neuro    Major neurocognitive disorder       Psychiatric    Bipolar disorder    History of OCD (obsessive compulsive disorder)     Summary/Recommendations:  Mr. Garcia and his wife reported a gradual onset of cognitive changes in , with initial fluctuating course and progression over time. He requires assistance with instrumental activities of daily living and some help with dressing. They reported some anxiety and irritability,  "with perceptual inaccuracies (e.g., not thinking his house is his house) and hallucinations. He has a history of bipolar disorder and obsessive compulsive disorder. They denied movements in sleep but reported an altered sleep/wake cycle. He has balance difficulty and gait changes.    Mr. Garcia will complete a neuropsychological evaluation on 9/14/2022.    Thank you for allowing me to participate in Mr. Garcia's care.  If you have any questions, please contact me at [phone].     Maisha Paredes, Ph.D., ABPP  Board Certified in Clinical Neuropsychology  [facility] - Department of Neurology    HISTORY OF PRESENT ILLNESS AND CURRENT SYMPTOMS     Mr. Garcia has active problems noted below. He initially visited Neurology on 1/31/2022. Performance on a brief mental status measure was reduced (Mini Mental Status Exam [MMSE]=22/30). Physical exam was notable for slow and cautious gait, mild possibly age-appropriate shuffling, erect posture, decreased bilateral arm swing, unable to tandem, and positive retropulsion.    Cognitive Symptoms:  Type/Examples: He denied difficulty.  Attention: His wife said he has difficulty focusing and paying attention.  Mental Speed: His wife reported slowed mental speed.  Memory: His wife said he has difficulty recalling recent information. He can recall information from the past. Reminders are sometimes helpful.  Language: They reported word-finding difficulty. His wife said he has trouble following and comprehending.  Visuospatial/Perceptual: His wife said the other night, he said, "come on, I'm ready to go home." She had to let him go outside and walk, then had him look at the house. He then recognized it. This has happened over the past year or so.  Executive Functioning: His wife reported he has trouble following instructions. He said, "I pretty much catch on."  Onset: Gradual in onset in 2019  Course: Fluctuating at first, per records, with progression overall. His wife said " "he may have on week where he is "completely fine (not to 2018 levels)," and then another week with problems with memory and with reality testing.     Current Functional Status/Needs:  ADLs  Self-Care Eating Safety Other   His wife said he mostly does dressing and hygiene on his own. Sometimes he has trouble putting on clothes. Independent He is by himself on occasion. He does well now that he is not falling as frequently.      Instrumental IADLs:   Driving Medications/Health Household Finances   He stopped driving approximately one year ago. His wife manages. His wife manages. His wife took over two years ago.     Psychiatric/Behavioral Symptoms:  Mood:  Depression/Dysphoria Anxiety/Fearfulness Irritability   None reported. They reported occasional anxiety. His wife said he is anxious at night. He is working with psychiatry to adjust current medications. They reported some irritability, especially when he is having cognitive difficulty.     Behavior:  Agitation/Resistance Delusions/Paranoia Hallucinations   None reported. They reported sometimes he thinks people are in the house. His wife said sometimes he acts as if there are other people there. Sometimes, he sees his  grandparents in his house. His wife said he does not sound afraid when he tells her they were there.     Apathy/Motivation Repetitive/Restlessness Other   He reported some reduced motivation. His wife said he has obsessive compulsive disorder at baseline and may engage in behaviors somewhat more frequently.      Neurovegetative:  Sleep/Nighttime  Appetite Energy   He reported he sleeps well. His wife said he does not sleep at night but sleeps during the day. His wife reported he snores. He moves around during the night but not in his sleep. He has been referred to sleep medicine. His wife said his appetite is reduced, and he may require prompting to eat for a few days. He is sleeping during the day.     Suicidal/Homicidal Ideation: None " reported.    Physical Symptoms: Has some difficulty with balance. They denied changes in hearing or vision.     PERTINENT BACKGROUND INFORMATION   SOCIAL HISTORY    Family Status: , two children  Current Living Situation: Lives with wife and turtles (a high number)/snakes (2)  Primary Source of Support: Family  Daily Activities: cares for the pets; watches TV  Stressors: none reported  Other Factors:  Educational Level: Graduated high school with no difficulty; associate's degree in electronics  Occupational Status and History: Retired in 2002 (30 years); worked at [employer] (worked on various systems)  Other:    Family History   Problem Relation Age of Onset    Heart disease Father     Alcohol abuse Father      Family Neurologic History: Maternal aunt had Alzheimer's dementia; his mother had unspecified dementia in her late 70s  Family Psychiatric History: Negative for heritable risk factors    MEDICAL STATUS  Patient Active Problem List   Diagnosis    History of prostate cancer    History of colon cancer    Anemia, chronic disease    Iron deficiency anemia, unspecified    B12 deficiency    Continuous leakage of urine    Type 2 diabetes mellitus with obesity    Hypertension    History of small bowel obstruction    NICOLE (acute kidney injury)    Chronic bilateral low back pain without sciatica    Other hyperlipidemia    Chronic lumbar pain    Physical deconditioning    Gait disturbance    Other specified hypothyroidism    Lymphedema    Obesity (BMI 30-39.9)    Major neurocognitive disorder    Bipolar disorder    History of OCD (obsessive compulsive disorder)     Past Medical History:   Diagnosis Date    Colon polyp     Diabetes mellitus     Elevated PSA     GERD (gastroesophageal reflux disease)     Hyperlipidemia     Hypertension     Incontinence of urine     Neuropathy     Personal history of malignant neoplasm of large intestine     colon; prostate    Personal history of malignant neoplasm of prostate      Pneumonia 4/7/2020    SBO (small bowel obstruction) 11/7/2017    Suspected COVID-19 virus infection 4/7/2020     Past Surgical History:   Procedure Laterality Date    APPENDECTOMY      COLON SURGERY  2005    resection    PROSTATE SURGERY  2006    prostatectomy    ROTATOR CUFF REPAIR      Rt shoulder    SHOULDER SURGERY      TONSILLECTOMY, ADENOIDECTOMY      as a baby     Updated/Relevant Neurologic History:  Falls: He had a fall recently.  TBI: None reported.  Seizures: None reported.  Stroke: None reported.  Movement Concerns: None reported.  Prior Neuropsychological Testing: None reported.  Referral Diagnosis: R41.3 (ICD-10-CM) - Memory loss    Recent Labs  Lab Results   Component Value Date    PXNIKNTY75 779 08/11/2022     Lab Results   Component Value Date    RPR Non-reactive 01/31/2022     Lab Results   Component Value Date    FOLATE 15.0 08/11/2022     Lab Results   Component Value Date    TSH 3.640 05/11/2021     Lab Results   Component Value Date    HGBA1C 7.8 (H) 12/14/2021     No results found for: HIV1X2, [id number]    Imaging    Results for orders placed or performed during the hospital encounter of 02/10/22   MRI Brain Without Contrast    Narrative    CLINICAL HISTORY:  76 years (1945) Male Memory loss Memory loss.; Hx of prostate & colon CA.; No hx of CVA.    TECHNIQUE:  MR BRAIN WITHOUT IV CONTRAST. 255 images obtained. Multiplanar multisequence MRI of the brain was performed. Note this noncontrasted study does not specifically assess for intracranial metastatic disease or the intracranial circulation.    CONTRAST:  No contrast was administered.    COMPARISON:  None available.    FINDINGS:  No abnormal diffusion restriction to suggest an acute infarct, and no abnormal GRE signal intensity to suggest an intracranial bleed. There is no hydrocephalus, herniation or midline shift and the basal/suprasellar cisterns are patent. No acute osseous abnormality is identified.    There is minimal cerebral  atrophy when accounting for age, with mild to moderate periventricular nodular deep cerebral white matter T2 FLAIR hyperintensity, a nonspecific finding in this age group with one which is most commonly associated with small vessel ischemic disease.    While this study is not tailored for assessment of the intracranial circulation, the left vertebral artery and basilar artery appear ectatic and tortuous (vertebrobasilar dolichoectasia), with a hypoplastic right V4 segment. The intracranial internal carotid arteries are somewhat tortuous and ectatic as well (consider correlation for systemic hypertension).    The pituitary gland and infundibulum is normal in size without abnormal signal pattern. The craniocervical junction is unremarkable. The temporal bones, internal and external meati, and semicircular canals appear normal. The orbital contents are normal. The mastoids and paranasal sinuses are clear.    IMPRESSION:  1. No acute intracranial process, no finding to suggest an acute infarct or intracranial bleed.  2. Chronic and involutional findings as noted above.                    Electronically signed by:  Tavon Chacon MD  2/10/2022 12:08 PM CST Workstation: [id number]     Current Outpatient Medications:     ACCU-CHEK LOVE PLUS TEST STRP Strp, TEST BLOOD GLUCOSE EVERY DAY, Disp: , Rfl:     aspirin (ECOTRIN) 81 MG EC tablet, Take 1 tablet (81 mg total) by mouth once daily. (Patient not taking: Reported on 8/1/2022), Disp: 90 tablet, Rfl: 0    atorvastatin (LIPITOR) 40 MG tablet, TAKE 1 TABLET BY MOUTH EVERY DAY, Disp: 90 tablet, Rfl: 3    benazepriL (LOTENSIN) 20 MG tablet, TAKE 1 TABLET BY MOUTH EVERY DAY, Disp: 90 tablet, Rfl: 1    DULoxetine (CYMBALTA) 30 MG capsule, TAKE 1 CAPSULE BY MOUTH EVERY DAY IN THE MORNING, Disp: 90 capsule, Rfl: 1    DULoxetine (CYMBALTA) 60 MG capsule, TAKE 1 CAPSULE BY MOUTH EVERY DAY AT NIGHT, Disp: 90 capsule, Rfl: 1    levothyroxine (SYNTHROID) 75 MCG tablet, TAKE 1 TABLET  "BY MOUTH EVERY DAY BEFORE BREAKFAST, Disp: 90 tablet, Rfl: 0    metFORMIN (GLUCOPHAGE-XR) 750 MG ER 24hr tablet, Take 1 tablet (750 mg total) by mouth daily with breakfast. (Patient taking differently: Take 500 mg by mouth daily with breakfast.), Disp: 30 tablet, Rfl: 11    mirabegron (MYRBETRIQ) 50 mg Tb24, Take 1 tablet (50 mg total) by mouth once daily., Disp: 30 tablet, Rfl: 11    OLANZapine (ZYPREXA) 2.5 MG tablet, TAKE 1 TABLET BY MOUTH EVERY EVENING., Disp: 30 tablet, Rfl: 1    omeprazole (PRILOSEC) 40 MG capsule, Take 1 capsule (40 mg total) by mouth once daily., Disp: 90 capsule, Rfl: 1    OZEMPIC 1 mg/dose (4 mg/3 mL), INJECT 1 MG INTO THE SKIN EVERY 7 DAYS., Disp: , Rfl:     polyethylene glycol (GLYCOLAX) 17 gram/dose powder, polyethylene glycol 3350 17 gram/dose oral powder  MIX 17 GRAMS WITH 4 TO 8 OUNCES FLUID AND TAKE DAILY, Disp: , Rfl:     pregabalin (LYRICA) 75 MG capsule, TAKE 2 CAPSULES (150 MG TOTAL) BY MOUTH EVERY EVENING., Disp: 90 capsule, Rfl: 1    Updated/Relevant Psychiatric History: History of bipolar disorder, with diagnosis in 1997. They were engaged in counseling since the late 1980s prior to diagnosis.    Substance Use: None reported    MENTAL STATUS AND OBSERVATIONS:  APPEARANCE: Casually dressed and adequate grooming/hygiene.   ALERTNESS/ORIENTATION: Attentive and alert. Fully oriented (x5) to time and place  GAIT: Not assessed  MOTOR MOVEMENTS/MANNERISMS: Not assessed  SPEECH/LANGUAGE: Slow in rate, normal in rhythm, tone, and volume. No significant word finding difficulty noted. Expressive language was normal; receptive language was impaired. He often asked for repetition or clarification.  STATED MOOD/AFFECT: The patients stated mood was "alright." Affect was congruent with stated mood.   INTERPERSONAL BEHAVIOR: Rapport was quickly and easily established   SUICIDALITY/HOMICIDALITY: Denied  HALLUCINATIONS/DELUSIONS: None evidenced or endorsed  THOUGHT PROCESSES/INSIGHT: Thoughts " seemed logical and goal-directed.     BILLING  Service Description CPT Code Minutes Units   Psychiatric diagnostic evaluation by physician [phone]   Neurobehavioral status exam by physician [id number]  0   Each additional hour by physician [id number]  0

## 2022-09-08 PROBLEM — F03.90 MAJOR NEUROCOGNITIVE DISORDER: Status: ACTIVE | Noted: 2022-01-01

## 2022-09-08 PROBLEM — Z86.59 HISTORY OF OCD (OBSESSIVE COMPULSIVE DISORDER): Status: ACTIVE | Noted: 2022-01-01

## 2022-09-13 NOTE — PROGRESS NOTES
NEUROPSYCHOLOGY CONSULT    Referral Information  Name: Bret Garcia  MRN: [id number]  : 1945  Age: 77 y.o.  Race: White  Gender: male  Dominant Hand: Right  Referring Provider: Kacie Renee Np  [address] [facility] [address]  [address] [place] [address]  Billing: See below for details as coding/billing has changed   Referral Reason/Medical Necessity: Neuropsychological evaluation to assess current cognitive functioning, aid in differential diagnosis, and provide treatment recommendations in the context of cognitive and gait changes and visual hallucinations.  Consent/Emergency Plan: The patient expressed an understanding of the purpose of the evaluation and consented to all procedures. I informed the patient of limits to confidentiality and discussed an emergency plan.    SUMMARY/TREATMENT PLAN   Results from the interview indicate the following diagnoses and treatment plan recommendations. The patient does not have the ability to follow a treatment plan without help from family.    Diagnoses/Plan:  Problem List Items Addressed This Visit          Neuro    Major neurocognitive disorder       Psychiatric    Bipolar disorder    History of OCD (obsessive compulsive disorder)     Other Visit Diagnoses       Memory loss              Summary/Conclusions:  Mr. Garcia and his wife reported a gradual onset of cognitive changes in , with initial fluctuating course and progression over time. He requires assistance with instrumental activities of daily living and some help with dressing. They reported some anxiety and irritability, with perceptual inaccuracies (e.g., not thinking his house is his house) and hallucinations. He has a history of bipolar disorder and obsessive compulsive disorder. They denied movements in sleep but reported an altered sleep/wake cycle. He has balance difficulty and gait changes.    Mr. Garcia's neuropsychological assessment results were compared to an estimated average baseline; behavioral  observations indicated slowed responses and a need for repetition/clarification of instructions that was greater than expected based solely on potential hearing difficulty. He exhibited reduced brief mental status screening, with consistent scores compared to January 2021. He demonstrated variable attention and working memory, reduced processing speed, and variable executive functioning. Language was also variable, with greater difficulty on a longer naming task and variable comprehension. He did not demonstrate apraxia overall but was confused by the task, initially giving explanations rather than demonstrating actions. Visuospatial functioning was largely intact, with the exception of reduced performance when copying a complex figure. Learning and memory performances were notable for improved verbal performance with recognition and intact retention of brief story information. Visuospatial learning and recall were intact for simple to moderately complex designs and reduced for a moderately complex design (intrusion of a different design). He did not endorse significant anxiety or depression on self-report measures.    In sum, Mr. Garcia demonstrated impairments in attention, working memory, speed, executive functioning, and learning and memory (intact verbal recognition). Patterns of performance were largely frontal/subcortical. Report of cognitive changes that have impacted instrumental activities of daily living indicate a diagnosis of major neurocognitive disorder, mild, with behavioral disturbance is warranted. Report of gradual onset, fluctuating cognition, hallucinations, disordered sleep, possible dressing apraxia, and perceptual deficits (e.g., thinking his home is not his home) suggest a diagnosis of dementia with Lewy bodies, but not all symptoms are consistent (e.g., not clearly parkinsonian on exam, no clear REM sleep behaviors, visuospatial functioning not significantly impaired). He has prior  diagnoses of Bipolar disorder and OCD that may be contributing to cognitive difficulties but would not explain the full symptom picture. Patterns were not strongly suggestive of an Alzheimer's process. Continued monitoring will aid in differential diagnosis.    Recommendations:   Neuropsychology Follow-up: Follow-up in 12-18 months to assess cognitive functioning over time.     Medical Follow-Up: Continue usual follow up for current active medical issues.   Other Relevant Orders/Issues:   Sleep Medicine: Consultation with Sleep Medicine is recommended to assess current sleep quality, rule out a sleep-related disorder, and provide recommendations for treatment  Audiology: Continued follow-up with Audiology is recommended.  Behavioral Health: Continued follow-up with Behavioral Health is recommended.    Care Management Recommendation:  Care partners can contact Rajat Yañez MA, MBA ([email or web address]) or Melissa Lambert LCSW ([email or web address]) to learn more about [facility] via Zoom; Dementia Caregiver Support Group via Zoom; or volunteer-led in-person Dementia Caregiver Support Group.    Recommendations for Mr. Garcia:  Attention: Remember that inattention and lack of focus are major culprits to forgetting information so be sure and practice paying attention for adequate learning of information. If you rely on passive attention to remembering something (e.g., yeah, samina-huh approach), youll find you cannot recall it later. I recommend the following to improve attention, which may aid in later recall:   Reduce distractions in the area as much as possible.  Look at the person as they are speaking to you.   Paraphrase as they are speaking  Write down important pieces of information   Ask them to repeat if you zone out.   Have them simplify and reduce information that you need to attend to during conversation.   Have visual cues to remind you if you need to do something  later.  Processing Speed:   Using multiple modalities (e.g., listening, writing notes, asking questions, recording) to learn new information is likely to allow additional time for processing, thus improving memory for the material.   Allowing sufficient time to complete tasks will reduce frustration and help to ensure completion.  Executive Functioning:  Dont attempt to multi-task.  Separate tasks so that each can be completed one at a time.  Consider using a calendar/day planner, as that may be effective to help you plan and stay on track.  Color-coding specific tasks by importance may add additional benefit to your planner.  Break down large projects into smaller tasks and write down the steps to completing the task.  Taking notes while reading can help with recall.  Storing Information: Use the below strategies to help you further enhance how information is stored.  Rehearse - Immediately after seeing/hearing something, try to recall it.  Wait a few minutes, then check again.  Gradually lengthen the intervals between rehearsals.  Repetition of learned material is critical to ensure storage of information to be learned. Self-test at home to ensure learning.  Write down important information to improve your attention and focus and to have something to look back on when you need to recall it.  Make sure the person doesnt rattle off, but presents in a clear, logical, and unhurried manner.   Recalling Information:  Jog your memory - Lose something?  Think back to when you last had it.  What did you do next?  And after that?  Mentally walk yourself through each activity that followed.  Prodding your memory this way may enable you to recall the location of the missing item.  Use a cue - Symbolic reminders (the proverbial string around the finger) are helpful.  So too are memos, timers, calendar notes, etc.--keep them in visible, appropriate places.  Get organized - Have fixed locations for all important papers, key  phone numbers, medications, keys, wallet, glasses, tools, etc.  Develop routines - Routines can anchor memories so they do not drift away.    Sleep Hygiene: Poor sleep has a negative effect on cognition. Several strategies have been shown to improve sleep:   Caffeine intake in the afternoon and evening, as well as stuffing oneself at supper, can decrease the quality of restful sleep throughout the night.   Bedtime and wake-up times should be consistent every night and morning so the body becomes used to a single routine, even on the weekends.  Engage in daily physical activity, but not 2-3 hours before bedtime.   No technology use (television, computer, iPad) 1-2 hours before bed.   Have a wind down routine (e.g., soft lights in the house, bath before bed, reduced fluid intake, songs, reading, less noise) to promote sleep readiness.   Visit the www.sleepfoundation.org for more strategies.   Resources: Consider resources for support through the [facility] Office of Elderly Affairs (http://goea.[place].[facility]/), [place] Chapter of the Alzheimers Association (www.alz.org/[place]/), the Family Caregiver [place] (www.caregiver.org), and the American Psychological Association (http://www.apa.org/pi/about/publications/caregivers/consumers/index.aspxconsumers/index.aspx).    Practice good cognitive/brain health hygiene:  Engage in regular exercise, which increases alertness and arousal and can improve attention and focus.  Consider lower impact exercises, such as yoga or light walking.  Get a good nights sleep, as this can enhance alertness and cognition.  Eat healthy foods and balanced meals. It is notable that research indicates certain nutrients may aid in brain function, such as B vitamins (especially B6, B12, and folic acid), antioxidants (such as vitamins C and E, and beta carotene), and Omega-3 fatty acids. Talk with your physician or nutritionist about whats right for you.   Keep your brain active. Find  activities to stay mentally active, such as reading, games (cards, checkers), puzzles (crosswords, Sudoku, jig saw), crafts (models, woodworking), gardening, or participating in activities in the community.  Stay socially engaged. Continue staying active with your family and friends.    Managing Vascular Risk Factors: A personal history of disorders that affect the cardiovascular system (e.g., hypertension, hyperlipidemia, diabetes) can have a negative impact on brain functioning especially over many years. Therefore, it is very important for Mr. Garcia to maintain good control over risk factors. The following is recommended:  Take all medications as prescribed and follow-up with recommendations above.  Get regular physical exercise to the extent that it is possible.   Follow a Mediterranean diet, which emphasizes plant-based foods, whole grains, fish and healthy fats, such as olive oil, and has been shown to be brain protective.   Check blood pressure, cholesterol levels, blood sugar, and others as appropriate.    Prepare for the future: Mr. Garcia and caregivers should consider formal arrangements to allow a designated person to make medical and financial decisions for Mr. Garcia, should he become unable to do so.  Options to consider include designating a healthcare proxy, medical and/or financial power of , and completing advanced directives for healthcare decisions and estate planning (e.g., finalizing a will).  If cost is prohibitive, [facility] [employer] [facility] (https://[facility].org/) provides free  for individuals with low income.    Recommendations: Consider stress management techniques to reduce anxiety and respond to anxiety as it arises. Heres a simple three-minute exercise you can use no matter where you are:  1. Sit or stand up nice and tall. Let your eyes relax. Relax your jaw. Let your shoulders relax down your back and start to focus your attention on your breath.  2. Breathe  all the way in through your nose, filling your belly with your breath. Then, breathe all the way out through your nose. Its that simple.  3. Start to breathe into a count of three. Inhale for one, two, three. Then, exhale into a count of six: six, five, four, three, two and one.  4. Repeat. Inhale: one, two, three. Exhale: six, five, four, three, two and one.  5. Continue just like this for three minutes, or as long as you'd like. You can set a timer on your phone at the beginning of your practice.    Should your mind wander, simply notice, without judgment. Observe your thought. And let it go. Return your breath to your attention as many times as it takes, training your mind in the same way we train our bodies.    https://blog.[facility].org/articles/3-minute-mindful-breathing-exercise-for-anxiety-relief    https://blog.[facility].org/articles/shake-it-off-in-the-new-year-simple-stress-relief-techniques     Thank you for allowing me to participate in Mr. Garcia's care.  If you have any questions, please contact me at [phone].    Maisha Paredes, Ph.D., ABPP  Board Certified in Clinical Neuropsychology  [facility] - Department of Neurology    HISTORY OF PRESENT ILLNESS AND CURRENT SYMPTOMS     Mr. Garcia completed an initial interview via telehealth on 9/7/2022. The background information is taken from that interview, with updates.    Mr. Garcia has active problems noted below. He initially visited Neurology on 1/31/2022. Performance on a brief mental status measure was reduced (Mini Mental Status Exam [MMSE]=22/30). Physical exam was notable for slow and cautious gait, mild possibly age-appropriate shuffling, erect posture, decreased bilateral arm swing, unable to tandem, and positive retropulsion.    Cognitive Symptoms:  Type/Examples: He denied difficulty.  Attention: His wife said he has difficulty focusing and paying attention.  Mental Speed: His wife reported slowed mental speed.  Memory: His wife said  "he has difficulty recalling recent information. He can recall information from the past. Reminders are sometimes helpful.  Language: They reported word-finding difficulty. His wife said he has trouble following and comprehending.  Visuospatial/Perceptual: His wife said the other night, he said, "come on, I'm ready to go home." She had to let him go outside and walk, then had him look at the house. He then recognized it. This has happened over the past year or so.  Executive Functioning: His wife reported he has trouble following instructions. He said, "I pretty much catch on."  Onset: Gradual in onset in 2019  Course: Fluctuating at first, per records, with progression overall. His wife said he may have one week where he is "completely fine (not to 2018 levels)," and then another week with problems with memory and with reality testing.     Current Functional Status/Needs:  ADLs  Self-Care Eating Safety Other   His wife said he mostly does dressing and hygiene on his own. Sometimes he has trouble putting on clothes. Independent He is by himself on occasion. He does well now that he is not falling as frequently.      Instrumental IADLs:   Driving Medications/Health Household Finances   He stopped driving approximately one year ago. His wife manages. His wife manages. His wife took over two years ago.     Psychiatric/Behavioral Symptoms:  Mood:  Depression/Dysphoria Anxiety/Fearfulness Irritability   None reported. They reported occasional anxiety. His wife said he is anxious at night. He is working with psychiatry to adjust current medications. They reported some irritability, especially when he is having cognitive difficulty.     Behavior:  Agitation/Resistance Delusions/Paranoia Hallucinations   None reported. They reported sometimes he thinks people are in the house. His wife said sometimes he acts as if there are other people there. Sometimes, he sees his  grandparents in his house. His wife said he does " not sound afraid when he tells her they were there.     Apathy/Motivation Repetitive/Restlessness Other   He reported some reduced motivation. His wife said he has obsessive compulsive disorder at baseline and may engage in behaviors somewhat more frequently.      Neurovegetative:  Sleep/Nighttime  Appetite Energy   He reported he sleeps well. His wife said he does not sleep at night but sleeps during the day. His wife reported he snores. He moves around during the night but not in his sleep. He has been referred to sleep medicine. His wife said his appetite is reduced, and he may require prompting to eat for a few days. He is sleeping during the day.     Suicidal/Homicidal Ideation: None reported.    Physical Symptoms: Has some difficulty with balance. They denied changes in hearing or vision.     PERTINENT BACKGROUND INFORMATION   SOCIAL HISTORY    Family Status: , two children  Current Living Situation: Lives with wife and turtles (a high number)/snakes (2)  Primary Source of Support: Family  Daily Activities: cares for the pets; watches TV  Stressors: none reported  Other Factors:  Educational Level: Graduated high school with no difficulty; associate's degree in electronics  Occupational Status and History: Retired in 2002 (30 years); worked at [employer] (worked on various systems)  Other:    Family History   Problem Relation Age of Onset    Heart disease Father     Alcohol abuse Father      Family Neurologic History: Maternal aunt had Alzheimer's dementia; his mother had unspecified dementia in her late 70s  Family Psychiatric History: Negative for heritable risk factors    MEDICAL STATUS  Patient Active Problem List   Diagnosis    History of prostate cancer    History of colon cancer    Anemia, chronic disease    Iron deficiency anemia, unspecified    B12 deficiency    Continuous leakage of urine    Type 2 diabetes mellitus with obesity    Hypertension    History of small bowel obstruction    NICOLE  (acute kidney injury)    Chronic bilateral low back pain without sciatica    Other hyperlipidemia    Chronic lumbar pain    Physical deconditioning    Gait disturbance    Other specified hypothyroidism    Lymphedema    Obesity (BMI 30-39.9)    Major neurocognitive disorder    Bipolar disorder    History of OCD (obsessive compulsive disorder)     Past Medical History:   Diagnosis Date    Colon polyp     Diabetes mellitus     Elevated PSA     GERD (gastroesophageal reflux disease)     Hyperlipidemia     Hypertension     Incontinence of urine     Neuropathy     Personal history of malignant neoplasm of large intestine     colon; prostate    Personal history of malignant neoplasm of prostate     Pneumonia 4/7/2020    SBO (small bowel obstruction) 11/7/2017    Suspected COVID-19 virus infection 4/7/2020     Past Surgical History:   Procedure Laterality Date    APPENDECTOMY      COLON SURGERY  2005    resection    PROSTATE SURGERY  2006    prostatectomy    ROTATOR CUFF REPAIR      Rt shoulder    SHOULDER SURGERY      TONSILLECTOMY, ADENOIDECTOMY      as a baby     Updated/Relevant Neurologic History:  Falls: He had a fall recently.  TBI: None reported.  Seizures: None reported.  Stroke: None reported.  Movement Concerns: None reported.  Prior Neuropsychological Testing: None reported.  Referral Diagnosis: R41.3 (ICD-10-CM) - Memory loss    Recent Labs  Lab Results   Component Value Date    [id number] 779 08/11/2022     Lab Results   Component Value Date    RPR Non-reactive 01/31/2022     Lab Results   Component Value Date    FOLATE 15.0 08/11/2022     Lab Results   Component Value Date    TSH 3.640 05/11/2021     Lab Results   Component Value Date    HGBA1C 7.8 (H) 12/14/2021     No results found for: HIV1X2, SGT27ICLR    Imaging    Results for orders placed or performed during the hospital encounter of 02/10/22   MRI Brain Without Contrast    Narrative    CLINICAL HISTORY:  76 years (1945) Male Memory loss Memory  loss.; Hx of prostate & colon CA.; No hx of CVA.    TECHNIQUE:  MR BRAIN WITHOUT IV CONTRAST. 255 images obtained. Multiplanar multisequence MRI of the brain was performed. Note this noncontrasted study does not specifically assess for intracranial metastatic disease or the intracranial circulation.    CONTRAST:  No contrast was administered.    COMPARISON:  None available.    FINDINGS:  No abnormal diffusion restriction to suggest an acute infarct, and no abnormal GRE signal intensity to suggest an intracranial bleed. There is no hydrocephalus, herniation or midline shift and the basal/suprasellar cisterns are patent. No acute osseous abnormality is identified.    There is minimal cerebral atrophy when accounting for age, with mild to moderate periventricular nodular deep cerebral white matter T2 FLAIR hyperintensity, a nonspecific finding in this age group with one which is most commonly associated with small vessel ischemic disease.    While this study is not tailored for assessment of the intracranial circulation, the left vertebral artery and basilar artery appear ectatic and tortuous (vertebrobasilar dolichoectasia), with a hypoplastic right V4 segment. The intracranial internal carotid arteries are somewhat tortuous and ectatic as well (consider correlation for systemic hypertension).    The pituitary gland and infundibulum is normal in size without abnormal signal pattern. The craniocervical junction is unremarkable. The temporal bones, internal and external meati, and semicircular canals appear normal. The orbital contents are normal. The mastoids and paranasal sinuses are clear.    IMPRESSION:  1. No acute intracranial process, no finding to suggest an acute infarct or intracranial bleed.  2. Chronic and involutional findings as noted above.                    Electronically signed by:  Tavon Chacon MD  2/10/2022 12:08 PM [facility] Workstation: [id number]     Current Outpatient Medications:     ACCU-CHEK  LOVE PLUS TEST STRP Strp, TEST BLOOD GLUCOSE EVERY DAY, Disp: , Rfl:     aspirin (ECOTRIN) 81 MG EC tablet, Take 1 tablet (81 mg total) by mouth once daily. (Patient not taking: Reported on 8/1/2022), Disp: 90 tablet, Rfl: 0    atorvastatin (LIPITOR) 40 MG tablet, TAKE 1 TABLET BY MOUTH EVERY DAY, Disp: 90 tablet, Rfl: 3    benazepriL (LOTENSIN) 20 MG tablet, TAKE 1 TABLET BY MOUTH EVERY DAY, Disp: 90 tablet, Rfl: 1    DULoxetine (CYMBALTA) 30 MG capsule, TAKE 1 CAPSULE BY MOUTH EVERY DAY IN THE MORNING, Disp: 90 capsule, Rfl: 1    DULoxetine (CYMBALTA) 60 MG capsule, TAKE 1 CAPSULE BY MOUTH EVERY DAY AT NIGHT, Disp: 90 capsule, Rfl: 1    levothyroxine (SYNTHROID) 75 MCG tablet, TAKE 1 TABLET BY MOUTH EVERY DAY BEFORE BREAKFAST, Disp: 90 tablet, Rfl: 0    metFORMIN (GLUCOPHAGE-XR) 750 MG ER 24hr tablet, Take 1 tablet (750 mg total) by mouth daily with breakfast. (Patient taking differently: Take 500 mg by mouth daily with breakfast.), Disp: 30 tablet, Rfl: 11    mirabegron (MYRBETRIQ) 50 mg Tb24, Take 1 tablet (50 mg total) by mouth once daily., Disp: 30 tablet, Rfl: 11    OLANZapine (ZYPREXA) 2.5 MG tablet, TAKE 1 TABLET BY MOUTH EVERY DAY IN THE EVENING, Disp: 30 tablet, Rfl: 1    omeprazole (PRILOSEC) 40 MG capsule, Take 1 capsule (40 mg total) by mouth once daily., Disp: 90 capsule, Rfl: 1    OZEMPIC 1 mg/dose (4 mg/3 mL), INJECT 1 MG INTO THE SKIN EVERY 7 DAYS., Disp: , Rfl:     polyethylene glycol (GLYCOLAX) 17 gram/dose powder, polyethylene glycol 3350 17 gram/dose oral powder  MIX 17 GRAMS WITH 4 TO 8 OUNCES FLUID AND TAKE DAILY, Disp: , Rfl:     pregabalin (LYRICA) 75 MG capsule, TAKE 2 CAPSULES (150 MG TOTAL) BY MOUTH EVERY EVENING., Disp: 90 capsule, Rfl: 1    Updated/Relevant Psychiatric History: History of bipolar disorder, with diagnosis in 1997. They were engaged in counseling since the late 1980s prior to diagnosis.    Substance Use: None reported    MENTAL STATUS AND OBSERVATIONS:  APPEARANCE:  "Casually dressed and adequate grooming/hygiene.  He wore glasses.  ALERTNESS/ORIENTATION: Attentive and alert. Fully oriented (x5) to time and place  GAIT: Unremarkable  MOTOR MOVEMENTS/MANNERISMS: Unremarkable  SPEECH/LANGUAGE: Slow in rate, normal in rhythm, tone, and volume. No significant word finding difficulty noted. Expressive language was normal; receptive language was impaired. He often asked for repetition or clarification.  STATED MOOD/AFFECT: The patients stated mood was "alright." Affect was blunted during testing.   INTERPERSONAL BEHAVIOR: Rapport was quickly and easily established   SUICIDALITY/HOMICIDALITY: Denied  HALLUCINATIONS/DELUSIONS: None evidenced or endorsed  THOUGHT PROCESSES/INSIGHT: Thoughts seemed logical and goal-directed.     TESTING NOTE: Due to COVID-related safety precautions, this evaluation was conducted in the office with masks worn by the evaluator and patient at all times. The standard administration of evaluation procedures does not include masks.The impact of applying non-standard administration methods has been evaluated only in part by scientific research. While every effort was made to simulate standard assessment practices, the diagnostic conclusions and recommendations for treatment provided in this report are being advanced with these limitations considered.     TEST TAKING BEHAVIOR and VALIDITY: Mr. Garcia was cooperative with testing procedures but occasionally asked, "How much longer?" He worked very slowly and had long response latencies. He often exhibited confusion and required frequent repetition and clarification of task instructions and questions; he demonstrated perseveration, at times. He did not wear his hearing aides but stated he could hear the examiner. Affect was largely blunted, but he did make jokes on occasion. Scores on stand-alone and embedded performance validity measures were within normal limits, taking into account reported cognitive and " functional changes. The current results, therefore, are likely a valid reflection of the patient's current functioning.     PROCEDURES/TESTS ADMINISTERED:  In addition to performing a review of pertinent medical records, reviewing limits to confidentiality, conducting a clinical interview, and explaining procedures, the following measures were administered: MSVT; Test of Premorbid Functioning; Word Choice; CITH; DCT; Mini Mental Status Exam (MMSE); [place] Cognitive Assessment (MoCA); Wechsler Adult Intelligence Scale, Fourth Edition (WAIS-IV) selected subtests; Wechsler Memory Scale, Fourth Edition (WMS-IV), selected subtests; Neuropsychological Assessment Battery (NAB), selected subtests; [place] Naming Test (BNT-60; Lennie et al., 2004 norms ); Verbal fluency tests (FAS & animal naming; Lennie et al., 2004 norms); Nguyen Verbal Learning Test, Revised (HVLT-R; Form 1); [place] Making Test, parts A and B (Lennie et al., 2004 norms); Repeatable Battery for the Assessment of Neuropsychological Status (RBANS, Form A); Frontal Assessment Battery (JUANITA); [place] Complex Figure Test (RCFT, copy); Clock Drawing and Copy; Geriatric Depression Scale (GDS-30); Generalized Anxiety Disorder Questionnaire (EMMY-7). Manual norms were used unless otherwise indicated.      TEST RESULTS    PREMORBID FUNCTIONING Raw Score Standardized Score Percentile/CP Descriptor   TOPF simple + pred. eFSIQ - 103 58 Average   INTELLECTUAL FUNCTIONING Raw Score Standardized Score Percentile/CP Descriptor   WAIS-IV        WMI - 74 4 Below Average   PSI - 71 3 Below Average   COGNITIVE SCREENING Raw Score Standardized Score Percentile/CP Descriptor   MMSE 23 - - Impaired   Orientation - Place 2/5 - - -   Orientation - Date 5/5 - - -   RBANS        Immediate Memory - 61 0.5 Exceptionally Low    VS/Construction - 96 39 Average   Language - 82 12 Low Average   Attention - 60 0.4 Exceptionally Low    Delayed Memory - 64 1 Exceptionally Low    Total Scale - 65 1  Exceptionally Low    ES 17 - - -   LANGUAGE FUNCTIONING Raw Score Standardized Score Percentile/CP Descriptor   RBANS Naming 10 - 51-75 Average   RBANS Semantic Fluency 8 3 1 Exceptionally Low    TOPF Word Reading 33 95 37 Average   NAB Auditory Comprehension 84 37 9 Low Average   NAB Auditory Comprehension Colors 13 - 100 WNL   NAB Auditory Comprehension Shapes 21 - 10 WNL   NAB Auditory Comprehension Colors/Shapes/Numbers 21 - 100 WNL   NAB Auditory Comprehension Pointing 6 - 100 WNL   NAB Auditory Comprehension Yes/No 8 - 23 WNL   NAB Auditory Comprehension Paper Folding 15 - 39 WNL   NAB Naming 26 34 5 Below Average   NAB Naming Percent Correct After Semantic Cuing 60 - 74 WNL   NAB Naming Percent Correct After Phonemic Cuing 50 - 47 WNL   FAS 12 22 0.3 Exceptionally Low    Animal Naming 6 20 0 Exceptionally Low    BDAE Praxis: Natural Gestures 6 0-10 0-10 BNL   BDAE Praxis: Conventional Gestures 12 100 100 WNL   BDAE Praxis: Use of Pretend Objects 24 100 100 WNL   BDAE Praxis: [place]-Facial/Respiratory Movements 12 100 100 WNL   VISUOSPATIAL FUNCTIONING Raw Score Standardized Score Percentile/CP Descriptor   RBANS Line Orientation  16 - 26-50 Average   RBANS Figure Copy 18 10 50 Average   RCFT Copy 26.5 - 6-10 Below Average   RCFT Time to Copy 853 - <1 Exceptionally Low   Clock Request 3 - 7 Below Average   Clock Copy 5 - 100 WNL   Clock Total 8 - 11.6 Low Average   VR Copy 43 - >75 High Average   LEARNING & MEMORY Raw Score Standardized Score Percentile/CP Descriptor   RBANS        List Learning 14 3 1 Exceptionally Low    List Recall 0 - <2 Exceptionally Low   List Recognition 18 - 17-25 Low Average   Story Memory 8 4 2 Below Average   Story Recall 7 8 25 Average   Story Recognition  11 - 47-68 Average   Figure Recall 0 1 0.1 Exceptionally Low    Figure Recognition 0 - - BNL   CITH 10 - - -   WMS-IV Subtests        LM I 8 3 1 Exceptionally Low    LM II 3 4 2 Below Average   LM Recognition 17 - 26-50 Average    VR I 18 6 9 Low Average   VR II 8 7 16 Low Average   VR II Recognition 3 - 26-50 Average   MSVT        MSVT  - - -   MSVT  - - -   MSVT Cons 100 - - -   MSVT  - - -   MSVT FR 40 - - -   ATTENTION/WORKING MEMORY Raw Score Standardized Score Percentile/CP Descriptor   WAIS-IV Digit Span 16 6 9 Low Average         DS Forward 8 8 25 Average         DS Backward 5 6 9 Low Average         DS Sequence 3 5 5 Below Average         Longest Digit Forward 5 - - -         Longest Digit Backward 3 - - -         Longest Digit Sequence 3 - - -         RDS 7 - - -   WAIS-IV Arithmetic 7 5 5 Below Average   RBANS Digit Span  6 4 2 Below Average   MENTAL PROCESSING SPEED Raw Score Standardized Score Percentile/CP Descriptor   WAIS-IV Symbol Search 9 5 5 Below Average   WAIS-IV Coding 15 4 2 Below Average   RBANS Coding 20 4 2 Below Average   TMT A  163 18 <0.1 Exceptionally Low    TMT A errors 1 - - -   DCT 19 - - -   EXECUTIVE FUNCTIONING Raw Score Standardized Score Percentile/CP Descriptor   TMT B d/c #N/A #N/A #N/A   TMT B errors 2 - - -   JUANITA 11 -4.9 <1 Exceptionally Low    WAIS-IV Similarities 13 6 9 Low Average   WAIS-IV Matrix Reasoning 9 9 37 Average   MOOD & PERSONALITY Raw Score Standardized Score Percentile/CP Descriptor   GDS-30 0 - - WNL   EMMY-7 0 - - WNL     TESTING SUMMARY Mr. Toledos premorbid intellectual abilities were estimated to be in the average range based on word reading and demographic variables. Performance on a brief mental status exam was reduced; it was consistent compared to prior testing in January 2022. Performance on another brief cognitive measure was reduced overall, with some variability in performance (spatial skills and language within normal limits; all others reduced). Basic auditory attention was intact when repeating digits on a longer task but reduced on a brief task. Working memory was intact when reversing digits and relatively reduced on a digit sequencing task.  Working memory was reduced when solving mental arithmetic problems. Basic processing speed was reduced. Rapid symbol matching was reduced, with reduced visuomotor coding on multiple tasks. He was unable to complete a set-shifting task. Performance on a measure of abstract reasoning, verbal fluency, and motor programming was reduced. Conceptualization of a clock was reduced. Verbal and nonverbal abstract reasoning were intact. Phonemic and semantic fluency were reduced. Naming was intact on a brief measure and reduced on a longer measure, with improvement with cueing. Auditory comprehension was intact, with some difficulty following complex commands and errors when pointing to colors. He did not demonstrate clear apraxia but had some initial confusion on a natural gestures task, describing actions instead of mimicking them; he was able to imitate actions. Copy of a clock was intact. Copy of simple to moderately complex designs and a moderately complex design were intact. Copy of a very complex figure was reduced. List learning was reduced on a brief measure, with reduced retention and intact recognition. Story learning was reduced on a brief measure, with intact recall and recognition. Story learning on a longer measure was reduced, with reduced recall (largely consistent with initial learning) and intact recognition. Visual recall and recognition were reduced on a brief measure; he appeared to have an intrusion of an item from a different task. Visual learning, recall, and recognition were intact on a longer measure. Mr. Garcia did not endorse significant anxiety or depression on self-report measures.    BILLING  Service Description CPT Code Minutes Units   Test Evaluation Services --  --   Neuropsychological testing evaluation services by physician [phone]   Each additional hour by physician [id number]  2   Test Administration and Scoring --  --   Psychological or neuropsychological test administration and scoring  by physician [id number]  0   Each additional 30 minutes by physician [id number]  0   Psychological or neuropsychological test administration and scoring by technician [phone]   Each additional 30 minutes by technician [id number]  9

## 2022-09-19 NOTE — TELEPHONE ENCOUNTER
Left voicemail reminding patient to get labs done before appointment next week and if by some chance he could not get them done, then to please call and reschedule.

## 2022-09-21 NOTE — PROGRESS NOTES
NEUROPSYCHOLOGY FEEDBACK (TELEHEALTH)    Referral Information  Name: Bret Garcia  MRN: [id number]  : 1945  Age: 77 y.o.  Race: White  Gender: male  Referring Provider: Kacie Renee Np  Billing: See below for details as coding/billing has changed   Telemedicine:   The patient location is: [place], [place]  The provider location is: [place], [place]  The chief complaint leading to consultation/medical necessity is: Feedback from neuropsychological evaluation.  Visit type: Virtual visit with synchronous audio and video  Total time spent with patient: 35 minutes; [id number] attached to testing visit on 2022  Each patient to whom he or she provides medical services by telemedicine is:  (1) informed of the relationship between the physician and patient and the respective role of any other health care provider with respect to management of the patient; and (2) notified that he or she may decline to receive medical services by telemedicine and may withdraw from such care at any time.  Consent/Emergency Plan: The patient expressed an understanding of the purpose of the evaluation and consented to all procedures. I informed the patient of limits to confidentiality and discussed an emergency plan. His wife was present on the visit.    NOTE   Bret Garcia attended a feedback session today.  We discussed the results of the neuropsychological evaluation.  I provided Mr. Garcia with a copy of the evaluation report via mail or MyChart and gave time to discuss questions and concerns.    Maisha Paredes, Ph.D., ABPP  Board Certified in Clinical Neuropsychology  [facility] - Department of Neurology

## 2022-09-22 NOTE — TELEPHONE ENCOUNTER
Spoke to patient's spouse about patient getting labs done. She stated he was going to get them done today.

## 2022-09-28 NOTE — PROGRESS NOTES
SUBJECTIVE:    Patient ID: Bret Garcia is a 77 y.o. male.    Chief Complaint: Follow-up and Hypertension  78 yo male, here today to follow up on his chronic medical conditions at last visit his A1C has slightly decreased, his blood pressure is well controlled with lipids are controlled.  According to his wife he has followed up with psychology and neuropsychology and has completed psych testing.    We reviewed his Neuropsych recommendations     Patient has no new complaints today he denies any chest pain shortness of breath, dyspnea on exertion.        Significant past medical history:  Hyperlipidemia: Atorvastatin 40mg  Hypertension:  Benazepril  20 mg,   DM:  Ozempic 1mg weekly, Metformin XR 750mg ( A1c 7.8)   Hypothyrpoidism: Levothyroxine 75  OCD: Cymbalta 30mg/60mg, Zyprexa 5 mg  GERD: Omeprazole 40mg  Hx of prostate cancer:   Bladder leakage Sphincter replacement:  Mirabegron 50 mg  Hx of Colon Cancer:  Followed by GI, and Oncology  Neuropathy:  Lyrica 75 mg     Specialists  Urologist: Dr Escobar  Gastroenterology: Dr Ramos  Heme/Onc: Dr Bonilla  Ophtho: Dr Laboy  Neuro: Dr Kapoor   Nephrology: Dr Medina  Psych: Elizabeth Chilel     Smoke:None  ETOH: None  Exercise: None       Hemoglobin A1C 4.5 - 6.2 % 7.7 High        Lipid  Chol: 151  Tri  HDL: 52  LDL: 81      Diabetes  He presents for his follow-up diabetic visit. He has type 2 diabetes mellitus. His disease course has been stable. There are no hypoglycemic associated symptoms. Pertinent negatives for hypoglycemia include no headaches, nervousness/anxiousness or sweats. There are no diabetic associated symptoms. Pertinent negatives for diabetes include no blurred vision, no chest pain, no fatigue, no foot paresthesias, no polydipsia, no polyphagia, no polyuria, no weakness and no weight loss. There are no hypoglycemic complications. Symptoms are stable.   Hypertension  This is a chronic problem. The current episode started more than 1 year  ago. The problem is unchanged. The problem is controlled. Pertinent negatives include no anxiety, blurred vision, chest pain, headaches, malaise/fatigue, neck pain, orthopnea, palpitations, peripheral edema, PND, shortness of breath or sweats. There are no associated agents to hypertension. Risk factors for coronary artery disease include male gender, obesity, sedentary lifestyle, dyslipidemia and diabetes mellitus. Past treatments include ACE inhibitors. The current treatment provides moderate improvement. Compliance problems include exercise and diet.  Identifiable causes of hypertension include a thyroid problem.   Hyperlipidemia  This is a chronic problem. The current episode started more than 1 year ago. The problem is controlled. Recent lipid tests were reviewed and are normal. Exacerbating diseases include diabetes and obesity. Pertinent negatives include no chest pain or shortness of breath.   Thyroid Problem  Presents for follow-up visit. Patient reports no anxiety, constipation, diarrhea, fatigue, hair loss, heat intolerance, palpitations, weight gain or weight loss. The symptoms have been stable. His past medical history is significant for diabetes and hyperlipidemia.         Past Medical History:   Diagnosis Date    Colon polyp     Diabetes mellitus     Elevated PSA     GERD (gastroesophageal reflux disease)     Hyperlipidemia     Hypertension     Incontinence of urine     Neuropathy     Personal history of malignant neoplasm of large intestine     colon; prostate    Personal history of malignant neoplasm of prostate     Pneumonia 4/7/2020    SBO (small bowel obstruction) 11/7/2017    Suspected COVID-19 virus infection 4/7/2020     Social History     Socioeconomic History    Marital status:    Occupational History    Occupation: retired   Tobacco Use    Smoking status: Never    Smokeless tobacco: Never   Substance and Sexual Activity    Alcohol use: No    Drug use: Not Currently    Sexual  activity: Yes     Past Surgical History:   Procedure Laterality Date    APPENDECTOMY      COLON SURGERY  2005    resection    PROSTATE SURGERY  2006    prostatectomy    ROTATOR CUFF REPAIR      Rt shoulder    SHOULDER SURGERY      TONSILLECTOMY, ADENOIDECTOMY      as a baby     Family History   Problem Relation Age of Onset    Heart disease Father     Alcohol abuse Father      Current Outpatient Medications   Medication Sig Dispense Refill    ACCU-CHEK LOVE PLUS TEST STRP Strp TEST BLOOD GLUCOSE EVERY DAY      atorvastatin (LIPITOR) 40 MG tablet TAKE 1 TABLET BY MOUTH EVERY DAY 90 tablet 3    benazepriL (LOTENSIN) 20 MG tablet TAKE 1 TABLET BY MOUTH EVERY DAY 90 tablet 1    DULoxetine (CYMBALTA) 30 MG capsule TAKE 1 CAPSULE BY MOUTH EVERY DAY IN THE MORNING 90 capsule 1    DULoxetine (CYMBALTA) 60 MG capsule TAKE 1 CAPSULE BY MOUTH EVERY DAY AT NIGHT 90 capsule 1    levothyroxine (SYNTHROID) 75 MCG tablet TAKE 1 TABLET BY MOUTH EVERY DAY BEFORE BREAKFAST 90 tablet 0    metFORMIN (GLUCOPHAGE-XR) 750 MG ER 24hr tablet Take 1 tablet (750 mg total) by mouth daily with breakfast. (Patient taking differently: Take 500 mg by mouth daily with breakfast.) 30 tablet 11    mirabegron (MYRBETRIQ) 50 mg Tb24 Take 1 tablet (50 mg total) by mouth once daily. 30 tablet 11    OLANZapine (ZYPREXA) 2.5 MG tablet TAKE 1 TABLET BY MOUTH EVERY DAY IN THE EVENING 30 tablet 1    omeprazole (PRILOSEC) 40 MG capsule TAKE 1 CAPSULE BY MOUTH EVERY DAY 90 capsule 1    OZEMPIC 1 mg/dose (4 mg/3 mL) INJECT 1 MG INTO THE SKIN EVERY 7 DAYS.      pregabalin (LYRICA) 75 MG capsule TAKE 2 CAPSULES (150 MG TOTAL) BY MOUTH EVERY EVENING. 90 capsule 1    triamcinolone acetonide 0.1% (KENALOG) 0.1 % cream SMARTSI Gram(s) Topical Twice Daily PRN      aspirin (ECOTRIN) 81 MG EC tablet Take 1 tablet (81 mg total) by mouth once daily. (Patient not taking: No sig reported) 90 tablet 0    polyethylene glycol (GLYCOLAX) 17 gram/dose powder polyethylene  "glycol 3350 17 gram/dose oral powder   MIX 17 GRAMS WITH 4 TO 8 OUNCES FLUID AND TAKE DAILY       No current facility-administered medications for this visit.     Review of patient's allergies indicates:   Allergen Reactions    Codeine Other (See Comments) and Hallucinations     Other reaction(s): Rash  hallucinations    Penicillin     Penicillins Other (See Comments)     Other reaction(s): Shortness of breath  Other reaction(s): Itching  Unknown/as a child       Review of Systems   Constitutional:  Negative for activity change, fatigue, malaise/fatigue, unexpected weight change, weight gain and weight loss.   HENT:  Negative for congestion, hearing loss, nosebleeds, postnasal drip, rhinorrhea and trouble swallowing.    Eyes:  Negative for blurred vision, discharge and visual disturbance.   Respiratory:  Negative for cough, chest tightness, shortness of breath and wheezing.    Cardiovascular:  Negative for chest pain, palpitations, orthopnea and PND.   Gastrointestinal:  Negative for blood in stool, constipation, diarrhea and vomiting.   Endocrine: Negative for heat intolerance, polydipsia, polyphagia and polyuria.   Genitourinary:  Negative for difficulty urinating, hematuria and urgency.   Musculoskeletal:  Negative for arthralgias, joint swelling and neck pain.   Neurological:  Negative for weakness and headaches.   Psychiatric/Behavioral:  Negative for dysphoric mood. The patient is not nervous/anxious.         Blood pressure 112/68, pulse 85, height 5' 4" (1.626 m), weight 75.1 kg (165 lb 8 oz), SpO2 96 %. Body mass index is 28.41 kg/m².   Objective:      Physical Exam  Vitals reviewed.   Constitutional:       General: He is not in acute distress.     Appearance: Normal appearance. He is not ill-appearing or toxic-appearing.   HENT:      Head: Normocephalic and atraumatic.   Cardiovascular:      Rate and Rhythm: Normal rate and regular rhythm.      Heart sounds: Normal heart sounds. No murmur heard.  Pulmonary: "      Effort: Pulmonary effort is normal. No respiratory distress.      Breath sounds: Normal breath sounds. No wheezing or rhonchi.   Skin:     General: Skin is warm and dry.      Capillary Refill: Capillary refill takes less than 2 seconds.   Neurological:      Mental Status: He is alert.           Assessment:       1. Type 2 diabetes mellitus with obesity    2. Hypothyroidism, unspecified type    3. Needs flu shot    4. Hypertension, unspecified type    5. Other hyperlipidemia         Plan:           Type 2 diabetes mellitus with obesity  -     Microalbumin/creatinine urine ratio; Future; Expected date: 09/28/2022  -     Hemoglobin A1C; Future; Expected date: 09/28/2022    Hypothyroidism, unspecified type  -     TSH; Future; Expected date: 09/28/2022    Needs flu shot  -     Influenza - Quadrivalent - High Dose (65+) (PF) (IM)    Hypertension, unspecified type  Pt to continue on current dose of statins. Limit red meat, butter, fried foods, cheese, and other foods that have a lot of saturated fat. Consume more: lean meats, fish, fruits, vegetables, whole grains, beans, lentils, and nuts.  Weight loss, and 30-45 min of cardiovascular exercise daily.  Other hyperlipidemia  Reduce the amount of salt in your diet; Lose weight; Avoid drinking too much alcohol; Exercise at least 30 minutes per day most days of the week.  Continue current medications and home BP monitoring.
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
## 2022-10-12 NOTE — PATIENT INSTRUCTIONS
Stop Zyprexa.  Continue cymbalta.    Please go to emergency department if feeling as though you are a harm to yourself or others or if you are in crisis. Please call the clinic to report any worsening of symptoms or problems associated with medication.

## 2022-10-13 NOTE — TELEPHONE ENCOUNTER
----- Message from Kacie Renee NP sent at 10/12/2022  3:08 PM CDT -----  Pt had NP testing done. Please schedule follow up for memory with one of the Mds as per my last note   ----- Message -----  From: Elizabeth Chilel PA-C  Sent: 10/12/2022   2:46 PM CDT  To: JAY Jesus,    I saw Mr. Garcia and his wife today for follow up. I see he was diagnosed with mild MCI based on on neuropsych results. Would he be a candidate for aricept/namenda? They are hoping to maximize his functioning for as long as possible.     Thanks,  Elizabeth Chilel

## 2022-10-13 NOTE — TELEPHONE ENCOUNTER
Left message for the pt to schedule an appt with either Dr. Silva or Dr. Silvestre for memory issues, per Kacie Renee NP.

## 2022-10-14 NOTE — TELEPHONE ENCOUNTER
Please see the attached refill request.  Elizabeth Chilel PA-C discontinued the Olanzpaine on 8/1/22

## 2022-10-17 NOTE — TELEPHONE ENCOUNTER
"----- Message from Donavan Peters sent at 10/17/2022  1:40 PM CDT -----  Regarding: appt question  Type: Needs Medical Advice    Who Called:  wife // Shante Isaac Call Back Number: [phone]    Additional Information: f/u on call on 10/13. Was told has appt on 10/27 at 11am. Can see notes in epic stating "Spoke to the pt's wife, appt schedule on 10/24/22 at 1100.  On hold 7 day change." And hold msg in provider schedule. Wife wants to know when she will see appt in pt portal. Please call to discuss.        "

## 2022-10-24 NOTE — PATIENT INSTRUCTIONS
Start donepezil 5 mg nightly for 1 week and then increase to 10 mg nightly thereafter. Watch for side effects of stomach upset or nightmares and let me know if these occur.       To prevent further memory loss, some of the best preventative measures are following a healthy diet, getting regular exercise, and ensuring good sleep habits.  Approximately 30 to 45 minutes of brisk physical activity (brisk walking, swimming, stationary bicycle, etc.) 5 days a week has been shown to improve function in vascular dementias, and can lower the risk of stroke and slow progression of memory loss.    A Mediterranean style diet, or the DASH diet with lots of fresh fruits and vegetables, whole grains, more fish and chicken, less red meat, less dairy, less processed foods is also beneficial. For more information see:    https://www.rush.[facility]/news/diet-may-help-prevent-alzheimers     Minimize or eliminate the use of alcohol, and discuss any prescription medications you might be taking with your doctor to avoid medications which can cause sedation or worsen cognitive function.    Establish a regular, consistent sleep pattern and practice good sleep hygiene.  Avoid screen time (computer, TV, smartphones or tablets) or heavy meals for at least an hour before bedtime, and avoid caffeine or stimulants after 2 PM. Exercise earlier in the day or mornings and keep your sleeping environment comfortable.     Socializing with friends and family and staying both mentally and physically active is also very important. Continue with hobbies or activities that are engaging and practice activities that are mentally stimulating (word puzzles, Sudoku, etc) and stay active in the community.    Please look into the following websites, to help you find resources including day programs, caregivers and caregiver support, legal questions, support groups, etc.    [facility] ([facility])  [facility] - [address] [place] [address]  [facility] - [address]    Group meetings facilitated by Rajat Yañez MA, BORIS [phone]    ADDRESS  Mailing Address:  [address]  [place] [address] Street Address:  [address] [place] [address]   Web Address:  [email or web address]       Services offered at this location:  Chore, Congregate Meals, Home Delivered Meals, Homemaker, Information and Assistance, Legal, Material Aid, Medical Alert, Medication Management, NFCSP Information and Assistance, NFCSP In-Home Respite, NFCSP Material Aid, NGCSP Personal Care , NFCSP Public Education, NFCSP Sitter Service, NFCSP Support Groups, Nutrition Counseling, Nutrition Education, Outreach, Recreation, Transportation, Utility Assistance, Wellness, Area Agency on Aging, [place] on Aging        Website for the Alzheimer's Association:  http://www.alz.org/   Excellent resources for finding community resources  Action plan navigator and online tools  Call 1533.212.7023 for 24/7 helpline    http://www.communityresourcefinder.org    [facility] Office of Aging and Adult Services:  Http://www.ldh.la.[facility]/index.cfm/subhome/12/n/7    [facility] With Dementia: http://[employer].GITR/    Website for [facility] Agency on Ageing: http://goea.[place].[facility]/index.cfm?md=adeel&tmp=category&catID=38&nid=24&ssid=0&startIndex=1      PATIENT/FAMILY RESOURCES:  1. Alzheimer's Association                                                                 http://www.alz.org  2. Alzheimer's Foundation of [place]                                               http://www.alzfdn.org  3. The Alzheimer's Disease Education and Referral Center             https://www.[place].nih.gov/alzheimers  4. Lewy Body Dementia Association                                                  http://www.lbda.org  5. National Central of Mental Health                                                 http://www.nimh.nih.gov  6. National [place] on Mental Illness                                                 http://www.duncan.org  7. Mental Health [place]                                                                   http://www.mentalhealthamerica.net  8. Mental Health.gov                                                                           http://www.mentalhealth.gov  9. National [place] for Behavioral Health                                          http://www.thenationalcouncil.org  10. Substance Abuse and Mental Health Services Administration   http://www.samhsa.gov  11. Licensed local counselors, social workers, psychiatrists, psychologists - one starting point is the Psychology Today website, therapist finder
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
## 2022-11-14 PROBLEM — R29.6 FREQUENT FALLS: Status: ACTIVE | Noted: 2022-01-01

## 2022-11-14 PROBLEM — D69.6 THROMBOCYTOPENIA: Status: ACTIVE | Noted: 2022-01-01

## 2022-11-14 PROBLEM — J18.9 PNEUMONIA: Status: ACTIVE | Noted: 2022-01-01

## 2022-11-14 PROBLEM — E83.42 HYPOMAGNESEMIA: Status: ACTIVE | Noted: 2022-01-01

## 2022-11-14 PROBLEM — R79.89 ELEVATED TROPONIN: Status: ACTIVE | Noted: 2022-01-01

## 2022-11-14 PROBLEM — R73.9 HYPERGLYCEMIA: Status: ACTIVE | Noted: 2022-01-01

## 2022-11-14 PROBLEM — E87.1 HYPONATREMIA: Status: ACTIVE | Noted: 2022-01-01

## 2022-11-14 PROBLEM — A41.9 SEPSIS: Status: ACTIVE | Noted: 2022-01-01

## 2022-11-14 NOTE — ED PROVIDER NOTES
"Encounter Date: 11/14/2022       History     Chief Complaint   Patient presents with    Fall     Pt fell around 1000 today and hit his head, denies LOC and denies blood thinners.  Pt states it was a trip and fall.  Pt CBG was "HI"      77-year-old male past medical history of hypertension, hyperlipidemia, diabetes, presents emergency department with fall.  She reported by EMS he had a fall earlier today.  Patient states that he tripped over a rug in the kitchen and hit the back of his head.  Did not lose consciousness, no vomiting.  EMS roving said his blood sugar read high.  Brought him in for further evaluation.  Patient states that he also had a fall yesterday where he tripped over rug in the kitchen.  Denies any pain anywhere other than his head.  Patient provides most of the history in addition to EMS.  Patient says that he is not been ill recently.  No recent vomiting diarrhea or any cough or congestion.  No recent fever chills.  When we hooked patient up to the monitor his 81% on room air, heart rate is 126, blood pressure 81/51.  Still do not have a temperature on the patient.  Patient is not tachypneic    Review of patient's allergies indicates:   Allergen Reactions    Codeine Other (See Comments) and Hallucinations     Other reaction(s): Rash  hallucinations    Penicillin     Penicillins Other (See Comments)     Other reaction(s): Shortness of breath  Other reaction(s): Itching  Unknown/as a child     Past Medical History:   Diagnosis Date    Colon polyp     Diabetes mellitus     Elevated PSA     GERD (gastroesophageal reflux disease)     Hyperlipidemia     Hypertension     Incontinence of urine     Neuropathy     Personal history of malignant neoplasm of large intestine     colon; prostate    Personal history of malignant neoplasm of prostate     Pneumonia 4/7/2020    SBO (small bowel obstruction) 11/7/2017    Suspected COVID-19 virus infection 4/7/2020     Past Surgical History:   Procedure Laterality " Date    APPENDECTOMY      COLON SURGERY  2005    resection    PROSTATE SURGERY  2006    prostatectomy    ROTATOR CUFF REPAIR      Rt shoulder    SHOULDER SURGERY      TONSILLECTOMY, ADENOIDECTOMY      as a baby     Family History   Problem Relation Age of Onset    Heart disease Father     Alcohol abuse Father      Social History     Tobacco Use    Smoking status: Never    Smokeless tobacco: Never   Substance Use Topics    Alcohol use: No    Drug use: Not Currently     Review of Systems   Constitutional:  Negative for chills, fatigue and fever.   HENT:  Negative for sore throat.    Respiratory:  Negative for shortness of breath.    Cardiovascular:  Negative for chest pain and palpitations.   Gastrointestinal:  Negative for abdominal pain, nausea and vomiting.   Genitourinary:  Negative for dysuria.   Musculoskeletal:  Negative for back pain.   Skin:  Negative for rash.   Neurological:  Positive for headaches. Negative for seizures, syncope and weakness.   Hematological:  Does not bruise/bleed easily.   All other systems reviewed and are negative.    Physical Exam     Initial Vitals   BP Pulse Resp Temp SpO2   11/14/22 1217 11/14/22 1217 11/14/22 1217 11/14/22 1556 11/14/22 1217   (!) 81/51 (!) 126 18 99.5 °F (37.5 °C) (!) 81 %      MAP       --                Physical Exam    Nursing note and vitals reviewed.  Constitutional: He appears well-developed and well-nourished. He is not diaphoretic. No distress.   HENT:   Head: Normocephalic and atraumatic.   Mouth/Throat: Oropharynx is clear and moist. No oropharyngeal exudate.   Abrasion to the vertex of the head, bleeding controlled.   Eyes: Conjunctivae and EOM are normal. Pupils are equal, round, and reactive to light.   Neck: Neck supple. No tracheal deviation present.   Normal range of motion.  Cardiovascular:  Regular rhythm, normal heart sounds and intact distal pulses.           No murmur heard.  Tachycardic   Pulmonary/Chest: Breath sounds normal. No stridor. No  respiratory distress. He has no wheezes. He has no rhonchi. He has no rales.   Abdominal: Abdomen is soft. Bowel sounds are normal. He exhibits no distension. There is no abdominal tenderness. There is no rebound and no guarding.   Musculoskeletal:         General: No tenderness or edema. Normal range of motion.      Cervical back: Normal range of motion and neck supple.      Comments: Full range of motion in hips shoulders ankles and knees.  No tenderness on extremities.     Neurological: He is alert. He has normal strength. No cranial nerve deficit or sensory deficit. GCS score is 15. GCS eye subscore is 4. GCS verbal subscore is 5. GCS motor subscore is 6.   Oriented to person, police, able to tell me the year but not the month.  Did not want to speak about the president.  No focal deficits.  5/5 strength in upper and lower extremities bilaterally.   Skin: Skin is warm and dry. Capillary refill takes less than 2 seconds. No rash noted. No erythema. No pallor.   Psychiatric: He has a normal mood and affect.       ED Course   Procedures  Labs Reviewed   CBC W/ AUTO DIFFERENTIAL - Abnormal; Notable for the following components:       Result Value    WBC 26.22 (*)     RBC 3.44 (*)     Hemoglobin 10.6 (*)     Hematocrit 31.7 (*)     Platelets 86 (*)     Mono % 24.0 (*)     All other components within normal limits    Narrative:     Recoll. [id number] by [email or web address] at 11/14/2022 13:00, reason: Specimen   clotted  Notified ILENE BARROW   COMPREHENSIVE METABOLIC PANEL - Abnormal; Notable for the following components:    Sodium 125 (*)     Chloride 88 (*)     CO2 22 (*)     Glucose 581 (*)     BUN 37 (*)     Creatinine 3.1 (*)     Albumin 3.1 (*)     Total Bilirubin 1.2 (*)     AST 42 (*)     eGFR 20.1 (*)     All other components within normal limits    Narrative:     Glucose critical result(s) called and verbal readback obtained from   Paris Waldron RN by HS3 11/14/2022 13:35   TROPONIN I - Abnormal; Notable for the following  components:    Troponin I 0.292 (*)     All other components within normal limits   PROTIME-INR - Abnormal; Notable for the following components:    PT 18.7 (*)     All other components within normal limits   LACTIC ACID, PLASMA - Abnormal; Notable for the following components:    Lactate (Lactic Acid) 3.4 (*)     All other components within normal limits    Narrative:     LA critical result(s) called and verbal readback obtained from Mykel Carr RN by HS3 11/14/2022 14:47   URINALYSIS, REFLEX TO URINE CULTURE - Abnormal; Notable for the following components:    Appearance, UA Hazy (*)     Protein, UA 2+ (*)     Glucose, UA 4+ (*)     Ketones, UA Trace (*)     Bilirubin (UA) 1+ (*)     Occult Blood UA 2+ (*)     Urobilinogen, UA 2.0-3.0 (*)     All other components within normal limits    Narrative:     Specimen Source->Urine   TROPONIN I - Abnormal; Notable for the following components:    Troponin I 0.244 (*)     All other components within normal limits   PROCALCITONIN - Abnormal; Notable for the following components:    Procalcitonin 1.52 (*)     All other components within normal limits   MAGNESIUM - Abnormal; Notable for the following components:    Magnesium 1.4 (*)     All other components within normal limits   LACTIC ACID, PLASMA - Abnormal; Notable for the following components:    Lactate (Lactic Acid) 2.6 (*)     All other components within normal limits    Narrative:     LA critical result(s) called and verbal readback obtained from Sera Katz RN/ED  by JB8 11/14/2022 18:30   GLUCOSE, RANDOM - Abnormal; Notable for the following components:    Glucose 232 (*)     All other components within normal limits   URINALYSIS, REFLEX TO URINE CULTURE - Abnormal; Notable for the following components:    Appearance, UA Hazy (*)     Protein, UA 2+ (*)     Glucose, UA 4+ (*)     Ketones, UA Trace (*)     Bilirubin (UA) 1+ (*)     Occult Blood UA 2+ (*)     Urobilinogen, UA 2.0-3.0 (*)     All other components  within normal limits    Narrative:     Specimen Source->Urine   CK-MB - Abnormal; Notable for the following components:    CPK MB 22.3 (*)     All other components within normal limits   URINALYSIS MICROSCOPIC - Abnormal; Notable for the following components:    RBC, UA 13 (*)     WBC, UA 9 (*)     Hyaline Casts,  (*)     All other components within normal limits    Narrative:     Specimen Source->Urine   LACTIC ACID, PLASMA - Abnormal; Notable for the following components:    Lactate (Lactic Acid) 2.4 (*)     All other components within normal limits    Narrative:       Lactid acid critical result(s) called and verbal readback obtained   from Meg Christy RN ER  by MS1 11/14/2022 21:46   RENAL FUNCTION PANEL - Abnormal; Notable for the following components:    Glucose 232 (*)     Sodium 130 (*)     Potassium 3.3 (*)     Chloride 92 (*)     BUN 37 (*)     Creatinine 2.7 (*)     Albumin 2.9 (*)     Phosphorus 2.4 (*)     eGFR 23.7 (*)     All other components within normal limits   POCT GLUCOSE - Abnormal; Notable for the following components:    POC Glucose 569 (*)     All other components within normal limits   ISTAT PROCEDURE - Abnormal; Notable for the following components:    POC PH 7.327 (*)     POC PCO2 45.1 (*)     POC PO2 38 (*)     POC HCO3 23.6 (*)     POC SATURATED O2 68 (*)     All other components within normal limits   POCT GLUCOSE - Abnormal; Notable for the following components:    POC Glucose 475 (*)     All other components within normal limits   POCT GLUCOSE - Abnormal; Notable for the following components:    POC Glucose 215 (*)     All other components within normal limits   CULTURE, BLOOD    Narrative:     Aerobic and anaerobic   CULTURE, BLOOD   CULTURE, RESPIRATORY   INFLUENZA A AND B ANTIGEN    Narrative:     Specimen Source->Nasopharyngeal Swab   BETA - HYDROXYBUTYRATE, SERUM   B-TYPE NATRIURETIC PEPTIDE   MAGNESIUM   PHOSPHORUS   SARS-COV-2 RNA AMPLIFICATION, QUAL   PHOSPHORUS   CK    CK   CK-MB   B-TYPE NATRIURETIC PEPTIDE   POCT GLUCOSE, HAND-HELD DEVICE   POCT CREATININE   POCT GLUCOSE MONITORING CONTINUOUS        ECG Results              EKG 12-lead (In process)  Result time 11/14/22 12:37:35      In process by Interface, Lab In [facility] (11/14/22 12:37:35)                   Narrative:    Test Reason : R07.9,    Vent. Rate : 126 BPM     Atrial Rate : 126 BPM     P-R Int : 186 ms          QRS Dur : 098 ms      QT Int : 306 ms       P-R-T Axes : 000 073 043 degrees     QTc Int : 443 ms    Sinus tachycardia  Incomplete right bundle branch block  Anterior infarct ,age undetermined  Abnormal ECG  When compared with ECG of 07-APR-2020 17:46,  Incomplete right bundle branch block is now Present  Anterior infarct is now Present    Referred By: [email or web address]   SELF           Confirmed By:                                   Imaging Results              CT Cervical Spine Without Contrast (Final result)  Result time 11/14/22 14:41:32      Final result by Schuyler Fields MD (11/14/22 14:41:32)                   Narrative:    CMS MANDATED QUALITY DATA - CT RADIATION - 436    All CT scans at this facility utilize dose modulation, iterative reconstruction, and/or weight based dosing when appropriate to reduce radiation dose to as low as reasonably achievable.        Reason: Neck trauma (Age >= 65y)    TECHNIQUE: Cervical spine CT without IV contrast obtained with coronal and sagittal reformations.    COMPARISON: None    FINDINGS:    Negative for fracture. Multilevel discogenic, uncovertebral facet degenerative changes of the cervical spine demonstrated. There is reversal of cervical lordosis which could be positional.    Visualized cervical soft tissues are unremarkable. There is mild haziness of the lung apices.        IMPRESSION:    1.  Degenerative changes of the cervical spine with no acute osseous abnormality.  2.  Reversal of the cervical lordosis could be positional.  3.  Mild haziness of the lung apices  could reflect atelectasis or infiltrate.    Electronically signed by:  Schuyler Fields DO  11/14/2022 2:41 PM CST Workstation: [id number]                                     CT Head Without Contrast (Final result)  Result time 11/14/22 14:33:00      Final result by Giana Oates IV, MD (11/14/22 14:33:00)                   Narrative:    All CT scans at this facility use dose modulation, degenerative reconstruction  and/or weight-based dosing when appropriate to reduce radiation dose to as low as reasonably achievable.    CT of the brain without contrast    CLINICAL HISTORY: Trauma, headache.    There are changes of decrease in the attenuation of the periventricular and subcortical white matter which represents a nonspecific finding most likely representative of areas of demyelination on the basis of chronic small vessel ischemia. There are no findings of acute intracranial hemorrhage or infarction. There is no intra-axial mass, mass effect or shift of the midline.    Prominence of the ventricular system and sulci are a reflection of cortical atrophy. Arteriosclerotic calcification is observed within the intracranial segments of the carotid and vertebral arteries. There is ectasia of the intracranial segment of the left vertebral artery, left basilar artery and internal carotid arteries.    Viewed at bone windows, there is no evidence of skull fracture. The visualized portions of the paranasal sinuses and mastoid air cells are appropriately aerated.    IMPRESSION:    Chronic small vessel ischemic changes and age-related involutional changes.    Arteriosclerosis.    No acute intracranial abnormality.    Electronically signed by:  Giana Oates MD  11/14/2022 2:33 PM CST Workstation: [id number]                                     CT Chest Abdomen Pelvis Without Contrast (XPD) (Final result)  Result time 11/14/22 14:47:31   Procedure changed from CT Abdomen Pelvis With Contrast     Final result by Giana Oates IV, MD  (11/14/22 14:47:31)                   Narrative:    CMS MANDATED QUALITY DATA - CT RADIATION 436    All CT scans at this facility utilize dose modulation, iterative reconstruction, and/or weight based dosing when appropriate to reduce radiation dose to as low as reasonably achievable.    CT of the chest, abdomen and pelvis without contrast    HISTORY: Chest and epigastric abdominal pain.    Comparison 6/24/2021.    In the interval, there has been development of scattered groundglass type pulmonary opacities within both lungs. This appearance could relate to mild pulmonary edema of various etiologies. Atypical infection or other inflammatory processes are not excluded. There is no significant pleural fluid accumulation. Dependent and basilar pulmonary opacities are likely a reflection of atelectasis/scarring. The central airways are patent.    The heart is normal in size. There is no significant pericardial fluid. No pathologic adenopathy is observed. Scattered plaque is noted in the aorta and branches including the coronary arteries.    A fat-containing mass in the right posterior paraspinal soft tissues is compatible with a lipoma, unchanged.    The unenhanced liver and spleen appear normal in size. No focal parenchymal abnormalities are identified. There is a calcification within the right hepatic lobe, unchanged. The gallbladder, biliary system, pancreas and adrenal glands appear unremarkable. The kidneys are normal in size and position without suspicious mass or hydronephrosis.    Scattered atheromatous changes involve the wall of the aorta and branch vessels without aneurysmal dilatation. There are graph there are surgical changes involving the right hemicolon. There is no bowel wall thickening or evidence of obstruction. There has been previous repair of an anterior abdominal wall hernia. A small residual hernia is observed within the midline below the level of the hernia mesh containing a segment of small  bowel without evidence of strangulation or obstruction.    There is a reservoir within the left lower quadrant of the abdomen related to penile prosthesis. There is been a previous prostatectomy. There are no pelvic masses or adenopathy. The urinary bladder is incompletely distended but otherwise appears unremarkable.    There are multilevel degenerative changes within the spine.    IMPRESSION:    Degradation by motion.    Development of scattered groundglass type pulmonary opacities. See above.    Scattered arteriosclerosis including coronary arterial calcification.    Small persistent small bowel containing lower midline anterior abdominal wall hernia. There has been previous hernia repair surgery.    Electronically signed by:  Giana Oates MD  11/14/2022 2:47 PM CST Workstation: [id number]                                     X-Ray Chest AP Portable (Final result)  Result time 11/14/22 13:21:27      Final result by Schuyler Fields MD (11/14/22 13:21:27)                   Narrative:    Reason: Sepsis    FINDINGS:    Portable chest with comparison chest x-ray August 12, 2021 show normal cardiomediastinal silhouette.  Redemonstration of elevated left hemidiaphragm. There is blunting of the left hemidiaphragm with left basilar linear opacities, similar to previous exam. Linear opacities are also noted of the right lung base. Pulmonary vasculature is normal. No acute osseous abnormality.    IMPRESSION:    1.  Blunting of the left costophrenic angle with left basilar linear opacities are similar to previous exam, could reflect chronic pleural effusion, atelectasis and/or scarring.  2.  A few linear opacities of the right lung base likely reflect subsegmental atelectasis.    Electronically signed by:  Schuyler Fields DO  11/14/2022 1:21 PM CST Workstation: [id number]                                     Medications   iohexoL (OMNIPAQUE 350) injection 100 mL (has no administration in time range)   Tdap vaccine injection  0.5 mL (has no administration in time range)   lactated ringers infusion ( Intravenous New Bag 11/14/22 1921)   doxycycline capsule 100 mg (100 mg Oral Given 11/14/22 1923)   atorvastatin tablet 40 mg (has no administration in time range)   levothyroxine tablet 75 mcg (has no administration in time range)   oxybutynin 24 hr tablet 10 mg (has no administration in time range)   pantoprazole EC tablet 40 mg (has no administration in time range)   sodium chloride 0.9% flush 3 mL (has no administration in time range)   melatonin tablet 6 mg (has no administration in time range)   ondansetron injection 4 mg (has no administration in time range)   polyethylene glycol packet 17 g (has no administration in time range)   acetaminophen tablet 650 mg (has no administration in time range)   simethicone chewable tablet 80 mg (has no administration in time range)   acetaminophen tablet 650 mg (has no administration in time range)   naloxone 0.4 mg/mL injection 0.02 mg (has no administration in time range)   potassium bicarbonate disintegrating tablet 50 mEq (has no administration in time range)   potassium bicarbonate disintegrating tablet 35 mEq (has no administration in time range)   potassium bicarbonate disintegrating tablet 60 mEq (has no administration in time range)   magnesium oxide tablet 800 mg (has no administration in time range)   magnesium oxide tablet 800 mg (has no administration in time range)   potassium, sodium phosphates 280-160-250 mg packet 2 packet (has no administration in time range)   potassium, sodium phosphates 280-160-250 mg packet 2 packet (has no administration in time range)   potassium, sodium phosphates 280-160-250 mg packet 2 packet (has no administration in time range)   glucose chewable tablet 16 g (has no administration in time range)   glucose chewable tablet 24 g (has no administration in time range)   glucagon (human recombinant) injection 1 mg (has no administration in time range)    dextrose 10% bolus 125 mL (has no administration in time range)   dextrose 10% bolus 250 mL (has no administration in time range)   insulin aspart U-100 pen 1-10 Units (has no administration in time range)   insulin detemir U-100 pen 10 Units (has no administration in time range)   aspirin EC tablet 325 mg (has no administration in time range)   enoxaparin injection 30 mg (has no administration in time range)   cefTRIAXone (ROCEPHIN) 2 g/50 mL D5W IVPB (has no administration in time range)   potassium, sodium phosphates 280-160-250 mg packet 2 packet (has no administration in time range)   lactated ringers bolus 2,178 mL (0 mLs Intravenous Stopped 11/14/22 1345)   potassium chloride SA CR tablet 40 mEq (40 mEq Oral Given 11/14/22 1551)   cefTRIAXone (ROCEPHIN) 1 g/50 mL D5W IVPB (0 g Intravenous Stopped 11/14/22 1622)   magnesium sulfate 2g in water 50mL IVPB (premix) (0 g Intravenous Stopped 11/14/22 2056)   aspirin chewable tablet 324 mg (324 mg Oral Given 11/14/22 1702)   insulin regular injection 10 Units 0.1 mL (10 Units Intravenous Given 11/14/22 1708)     Medical Decision Making:   Clinical Tests:   Lab Tests: Ordered and Reviewed  Radiological Study: Ordered and Reviewed  Medical Tests: Ordered and Reviewed  ED Management:  77-year-old male presents emergency department with trip and fall as described above.  Patient found have borderline fever hypotensive tachycardic hypoxic.  Patient seems to have a left-sided infiltrate on CT scan.  More than likely consistent with pneumonia.  Patient given oxygen emergency department.  He has been covered with Rocephin here.  Lactic acid elevated.  It is trending down after fluids and treatment in the emergency department.  Patient also considered for PE but unable to CT given his NICOLE.  More than likely this is infectious/sepsis/pneumonia.  Be admitted to the hospitalist for further care and evaluation of symptoms.  Patient had CT scans of head cervical spine given his  fall and head injury.  Tetanus vaccine given patient given IV magnesium p.o. potassium IV antibiotics.  Patient critically ill will be admitted to the ICU for further care and evaluation of symptoms.          Attending Attestation:         Attending Critical Care:   Critical Care Times:   Direct Patient Care (initial evaluation, reassessments, and time considering the case)................................................................15 minutes.   Additional History from reviewing old medical records or taking additional history from the family, EMS, PCP, etc.......................5 minutes.   Ordering, Reviewing, and Interpreting Diagnostic Studies...............................................................................................................5 minutes.   Documentation..................................................................................................................................................................................10 minutes.   Consultation with other Physicians. .................................................................................................................................................5 minutes.   ==============================================================  Total Critical Care Time - exclusive of procedural time: 40 minutes.  ==============================================================  Critical care was necessary to treat or prevent imminent or life-threatening deterioration of the following conditions: sepsis.   The following critical care procedures were done by me (see procedure notes): blood draw for specimens and pulse oximetry.   Critical care was time spent personally by me on the following activities: obtaining history from patient or relative, examination of patient, review of old charts, development of treatment plan with patient or relative, ordering and performing treatments and interventions, evaluation of patient's response to  treatment, ordering lab, x-rays, and/or EKG, discussion with consultants, re-evaluation of patient's conition and interpretation of cardiac measurements.   Critical Care Condition: potentially life-threatening         ED Course as of 11/14/22 2157 Mon Nov 14, 2022   1242 EKG 12:24 p.m. sinus tachycardia rate of 126.  Incomplete right bundle branch block.  No STEMI.  EKG interpreted independently by me. [JR]      ED Course User Index  [JR] Jose Greene DO                 Clinical Impression:   Final diagnoses:  [R07.9] Chest pain  [W19.XXXA] Fall  [A41.9] Sepsis        ED Disposition Condition    Admit Stable                Jose Greene DO  11/14/22 2157

## 2022-11-14 NOTE — HPI
77-year-old gentleman with prior history of diabetes, hypertension, GERD, recent diagnosis of Lewy body dementia brought emergency room due to frequent falls.  According to wife for last 1 week patient is coughing productive sputum, very lethargic does not want to come out of bed, has extremely poor p.o. intake, he fell couple times and at times appears somnolent and confused.  Today when patient fell he started bleeding from scalp and was very lethargic and was eventually brought emergency room.  She also endorses cough, fever, lethargy, poor p.o. intake and frequent falls.  She tells me patient recently was diagnosed with Lewy body dementia.  Wife expressed that couple weeks ago he was working in yard and in last 1 week he has deconditioned significantly.  Otherwise denies any headache, dizziness, chest pain, palpitations, nausea, vomiting, bladder or bowel symptoms.     Upon arrival to ED patient was in significant distress, hypoxic, tachycardic.  Extensive imaging did not show any long bone fractures, CT chest raised concern of left lung pneumonia, was found to have sepsis, received initial fluid bolus, Rocephin however upon my interview he was not on any continuous IV fluids, blood sugar was 580 however he was not given any insulin in hope that sugar will come down with IV fluids.  His electrolytes were generously replaced.  No repeat lactic acid, Accu-Cheks, COVID test yet. Hospital Medicine was consulted for admission

## 2022-11-14 NOTE — PROGRESS NOTES
Pharmacist Renal Dose Adjustment Note    Bret Garcia is a 77 y.o. male being treated with the medication Enoxaparin.    Patient Data:    Vital Signs (Most Recent):  Temp: 99.5 °F (37.5 °C) (11/14/22 1556)  Pulse: (!) 114 (11/14/22 1556)  Resp: (!) 23 (11/14/22 1556)  BP: 92/62 (11/14/22 1556)  SpO2: (!) 92 % (11/14/22 1556)   Vital Signs (72h Range):  Temp:  [99.5 °F (37.5 °C)]   Pulse:  [111-126]   Resp:  [18-25]   BP: (81-97)/(51-64)   SpO2:  [81 %-97 %]      Recent Labs   Lab 11/14/22  1220   CREATININE 3.1*     Serum creatinine: 3.1 mg/dL (H) 11/14/22 1220  Estimated creatinine clearance: 18.1 mL/min (A)    Enoxaparin 40 mg subq every 24 hours will be changed to Enoxaparin 30 mg subq every 24 hours due to CrCl < 30 ml/min.    Pharmacist's Name: Adela Morales  Pharmacist's Extension: [phone]

## 2022-11-14 NOTE — CARE UPDATE
11/14/22 1300   Patient Assessment/Suction   Level of Consciousness (AVPU) alert   Respiratory Effort Unlabored   PRE-TX-O2   SpO2 97 %   Pulse (!) 122   Resp (!) 25   BP (!) 84/54   Labs   $ Was an ABG obtained? Venous Line;ISTAT - Blood gas;ISTAT - PH, Blood   $ Labs Tech Time 15 min   Critical Value Communication   Date Result Received 11/14/22   Time Result Received 1326   Resulting Department of Critical Value resp   Who communicated critical value from resulting department? kat jha rrt   Critical Test #1 ph   Critical Test #1 Result 7.32   Critical Test #2 po2   Critical Test #2 Result 38   Name of Notified Physician/Designee Jc DO   Date Notified 11/14/22   Time Notified 1326   Read Back Verification Yes

## 2022-11-14 NOTE — SUBJECTIVE & OBJECTIVE
Past Medical History:   Diagnosis Date    Colon polyp     Diabetes mellitus     Elevated PSA     GERD (gastroesophageal reflux disease)     Hyperlipidemia     Hypertension     Incontinence of urine     Neuropathy     Personal history of malignant neoplasm of large intestine     colon; prostate    Personal history of malignant neoplasm of prostate     Pneumonia 4/7/2020    SBO (small bowel obstruction) 11/7/2017    Suspected COVID-19 virus infection 4/7/2020       Past Surgical History:   Procedure Laterality Date    APPENDECTOMY      COLON SURGERY  2005    resection    PROSTATE SURGERY  2006    prostatectomy    ROTATOR CUFF REPAIR      Rt shoulder    SHOULDER SURGERY      TONSILLECTOMY, ADENOIDECTOMY      as a baby       Review of patient's allergies indicates:   Allergen Reactions    Codeine Other (See Comments) and Hallucinations     Other reaction(s): Rash  hallucinations    Penicillin     Penicillins Other (See Comments)     Other reaction(s): Shortness of breath  Other reaction(s): Itching  Unknown/as a child       No current facility-administered medications on file prior to encounter.     Current Outpatient Medications on File Prior to Encounter   Medication Sig    atorvastatin (LIPITOR) 40 MG tablet TAKE 1 TABLET BY MOUTH EVERY DAY (Patient taking differently: Take 40 mg by mouth once daily.)    benazepriL (LOTENSIN) 20 MG tablet TAKE 1 TABLET BY MOUTH EVERY DAY (Patient taking differently: Take 20 mg by mouth once daily.)    donepeziL (ARICEPT) 10 MG tablet Take 1 tablet (10 mg total) by mouth every evening.    DULoxetine (CYMBALTA) 30 MG capsule TAKE 1 CAPSULE BY MOUTH EVERY DAY IN THE MORNING (Patient taking differently: Take 30 mg by mouth every morning.)    DULoxetine (CYMBALTA) 60 MG capsule TAKE 1 CAPSULE BY MOUTH EVERY NIGHT (Patient taking differently: Take 60 mg by mouth nightly.)    levothyroxine (SYNTHROID) 75 MCG tablet TAKE 1 TABLET BY MOUTH EVERY DAY BEFORE BREAKFAST (Patient taking  differently: Take 75 mcg by mouth before breakfast.)    mirabegron (MYRBETRIQ) 50 mg Tb24 Take 1 tablet (50 mg total) by mouth once daily.    OLANZapine (ZYPREXA) 2.5 MG tablet Take 2.5 mg by mouth every evening.    omeprazole (PRILOSEC) 40 MG capsule TAKE 1 CAPSULE BY MOUTH EVERY DAY (Patient taking differently: Take 40 mg by mouth once daily.)    OZEMPIC 1 mg/dose (4 mg/3 mL) Inject 1 mg into the skin every 7 days. for 12 doses    pregabalin (LYRICA) 75 MG capsule TAKE 2 CAPSULES (150 MG TOTAL) BY MOUTH EVERY EVENING.    ACCU-CHEK LOVE PLUS TEST STRP Strp TEST BLOOD GLUCOSE EVERY DAY    metFORMIN (GLUCOPHAGE-XR) 750 MG ER 24hr tablet Take 1 tablet (750 mg total) by mouth daily with breakfast. (Patient taking differently: Take 500 mg by mouth daily with breakfast.)    triamcinolone acetonide 0.1% (KENALOG) 0.1 % cream SMARTSI Gram(s) Topical Twice Daily PRN     Family History       Problem Relation (Age of Onset)    Alcohol abuse Father    Heart disease Father          Tobacco Use    Smoking status: Never    Smokeless tobacco: Never   Substance and Sexual Activity    Alcohol use: No    Drug use: Not Currently    Sexual activity: Yes     Review of Systems   Constitutional:  Positive for activity change, appetite change, chills and fatigue.   HENT:  Negative for congestion and sore throat.    Eyes:  Negative for discharge and visual disturbance.   Respiratory:  Positive for cough and shortness of breath. Negative for chest tightness.    Cardiovascular:  Positive for leg swelling. Negative for chest pain.   Gastrointestinal:  Negative for abdominal pain and nausea.   Genitourinary:  Positive for difficulty urinating. Negative for dysuria and hematuria.   Musculoskeletal:  Positive for back pain. Negative for myalgias.   Skin:  Negative for pallor and rash.   Neurological:  Negative for dizziness and weakness.   Psychiatric/Behavioral:  Negative for behavioral problems. The patient is not nervous/anxious.    All  other systems reviewed and are negative.  Objective:     Vital Signs (Most Recent):  Temp: 99.5 °F (37.5 °C) (11/14/22 1556)  Pulse: (!) 114 (11/14/22 1556)  Resp: (!) 23 (11/14/22 1556)  BP: 92/62 (11/14/22 1556)  SpO2: (!) 92 % (11/14/22 1556)   Vital Signs (24h Range):  Temp:  [99.5 °F (37.5 °C)] 99.5 °F (37.5 °C)  Pulse:  [111-126] 114  Resp:  [18-25] 23  SpO2:  [81 %-97 %] 92 %  BP: (81-97)/(51-64) 92/62     Weight: 72.6 kg (160 lb)  Body mass index is 28.34 kg/m².    Physical Exam  Vitals and nursing note reviewed.   Constitutional:       General: He is in acute distress.      Appearance: He is well-developed. He is ill-appearing.   HENT:      Head: Atraumatic.      Mouth/Throat:      Mouth: Mucous membranes are dry.   Neck:      Vascular: No JVD.   Cardiovascular:      Rate and Rhythm: Regular rhythm. Tachycardia present.      Heart sounds: Normal heart sounds. No murmur heard.    No gallop.   Pulmonary:      Effort: Respiratory distress present.      Breath sounds: Normal breath sounds. No wheezing.      Comments: On supplemental oxygen, left lower lung rhonchi noted  Abdominal:      General: Bowel sounds are normal. There is no distension.      Palpations: Abdomen is soft.      Tenderness: There is no guarding or rebound.   Musculoskeletal:      Cervical back: Neck supple.      Right lower leg: Edema present.      Left lower leg: Edema present.   Lymphadenopathy:      Cervical: No cervical adenopathy.   Skin:     General: Skin is warm and dry.      Capillary Refill: Capillary refill takes less than 2 seconds.      Findings: No rash.      Comments: Scalp laceration noted, no active bleeding   Neurological:      Mental Status: He is alert and oriented to person, place, and time. Mental status is at baseline.      Cranial Nerves: No cranial nerve deficit.   Psychiatric:         Behavior: Behavior normal.           Significant Labs: All pertinent labs within the past 24 hours have been reviewed.    Significant  Imaging: I have reviewed all pertinent imaging results/findings within the past 24 hours.

## 2022-11-14 NOTE — H&P
"[facility]  History & Physical    Patient Name: Bret Garcia  MRN: [id number]  Patient Class: IP- Inpatient  Admission Date: 11/14/2022  Attending Physician: Gabriele Prabhakar MD   Primary Care Provider: Tab Cutler MD         Patient information was obtained from patient, spouse/SO, relative(s), past medical records and ER records.     Subjective:     Principal Problem:Sepsis    Chief Complaint:   Chief Complaint   Patient presents with    Fall     Pt fell around 1000 today and hit his head, denies LOC and denies blood thinners.  Pt states it was a trip and fall.  Pt CBG was "HI"         HPI: 77-year-old gentleman with prior history of diabetes, hypertension, GERD, recent diagnosis of Lewy body dementia brought emergency room due to frequent falls.  According to wife for last 1 week patient is coughing productive sputum, very lethargic does not want to come out of bed, has extremely poor p.o. intake, he fell couple times and at times appears somnolent and confused.  Today when patient fell he started bleeding from scalp and was very lethargic and was eventually brought emergency room.  She also endorses cough, fever, lethargy, poor p.o. intake and frequent falls.  She tells me patient recently was diagnosed with Lewy body dementia.  Wife expressed that couple weeks ago he was working in yard and in last 1 week he has deconditioned significantly.  Otherwise denies any headache, dizziness, chest pain, palpitations, nausea, vomiting, bladder or bowel symptoms.     Upon arrival to ED patient was in significant distress, hypoxic, tachycardic.  Extensive imaging did not show any long bone fractures, CT chest raised concern of left lung pneumonia, was found to have sepsis, received initial fluid bolus, Rocephin however upon my interview he was not on any continuous IV fluids, blood sugar was 580 however he was not given any insulin in hope that sugar will come down with IV fluids.  His " electrolytes were generously replaced.  No repeat lactic acid, Accu-Cheks, COVID test yet. Hospital Medicine was consulted for admission      Past Medical History:   Diagnosis Date    Colon polyp     Diabetes mellitus     Elevated PSA     GERD (gastroesophageal reflux disease)     Hyperlipidemia     Hypertension     Incontinence of urine     Neuropathy     Personal history of malignant neoplasm of large intestine     colon; prostate    Personal history of malignant neoplasm of prostate     Pneumonia 4/7/2020    SBO (small bowel obstruction) 11/7/2017    Suspected COVID-19 virus infection 4/7/2020       Past Surgical History:   Procedure Laterality Date    APPENDECTOMY      COLON SURGERY  2005    resection    PROSTATE SURGERY  2006    prostatectomy    ROTATOR CUFF REPAIR      Rt shoulder    SHOULDER SURGERY      TONSILLECTOMY, ADENOIDECTOMY      as a baby       Review of patient's allergies indicates:   Allergen Reactions    Codeine Other (See Comments) and Hallucinations     Other reaction(s): Rash  hallucinations    Penicillin     Penicillins Other (See Comments)     Other reaction(s): Shortness of breath  Other reaction(s): Itching  Unknown/as a child       No current facility-administered medications on file prior to encounter.     Current Outpatient Medications on File Prior to Encounter   Medication Sig    atorvastatin (LIPITOR) 40 MG tablet TAKE 1 TABLET BY MOUTH EVERY DAY (Patient taking differently: Take 40 mg by mouth once daily.)    benazepriL (LOTENSIN) 20 MG tablet TAKE 1 TABLET BY MOUTH EVERY DAY (Patient taking differently: Take 20 mg by mouth once daily.)    donepeziL (ARICEPT) 10 MG tablet Take 1 tablet (10 mg total) by mouth every evening.    DULoxetine (CYMBALTA) 30 MG capsule TAKE 1 CAPSULE BY MOUTH EVERY DAY IN THE MORNING (Patient taking differently: Take 30 mg by mouth every morning.)    DULoxetine (CYMBALTA) 60 MG capsule TAKE 1 CAPSULE BY MOUTH EVERY NIGHT (Patient  taking differently: Take 60 mg by mouth nightly.)    levothyroxine (SYNTHROID) 75 MCG tablet TAKE 1 TABLET BY MOUTH EVERY DAY BEFORE BREAKFAST (Patient taking differently: Take 75 mcg by mouth before breakfast.)    mirabegron (MYRBETRIQ) 50 mg Tb24 Take 1 tablet (50 mg total) by mouth once daily.    OLANZapine (ZYPREXA) 2.5 MG tablet Take 2.5 mg by mouth every evening.    omeprazole (PRILOSEC) 40 MG capsule TAKE 1 CAPSULE BY MOUTH EVERY DAY (Patient taking differently: Take 40 mg by mouth once daily.)    OZEMPIC 1 mg/dose (4 mg/3 mL) Inject 1 mg into the skin every 7 days. for 12 doses    pregabalin (LYRICA) 75 MG capsule TAKE 2 CAPSULES (150 MG TOTAL) BY MOUTH EVERY EVENING.    ACCU-CHEK LOVE PLUS TEST STRP Strp TEST BLOOD GLUCOSE EVERY DAY    metFORMIN (GLUCOPHAGE-XR) 750 MG ER 24hr tablet Take 1 tablet (750 mg total) by mouth daily with breakfast. (Patient taking differently: Take 500 mg by mouth daily with breakfast.)    triamcinolone acetonide 0.1% (KENALOG) 0.1 % cream SMARTSI Gram(s) Topical Twice Daily PRN     Family History       Problem Relation (Age of Onset)    Alcohol abuse Father    Heart disease Father          Tobacco Use    Smoking status: Never    Smokeless tobacco: Never   Substance and Sexual Activity    Alcohol use: No    Drug use: Not Currently    Sexual activity: Yes     Review of Systems   Constitutional:  Positive for activity change, appetite change, chills and fatigue.   HENT:  Negative for congestion and sore throat.    Eyes:  Negative for discharge and visual disturbance.   Respiratory:  Positive for cough and shortness of breath. Negative for chest tightness.    Cardiovascular:  Positive for leg swelling. Negative for chest pain.   Gastrointestinal:  Negative for abdominal pain and nausea.   Genitourinary:  Positive for difficulty urinating. Negative for dysuria and hematuria.   Musculoskeletal:  Positive for back pain. Negative for myalgias.   Skin:  Negative for  pallor and rash.   Neurological:  Negative for dizziness and weakness.   Psychiatric/Behavioral:  Negative for behavioral problems. The patient is not nervous/anxious.    All other systems reviewed and are negative.  Objective:     Vital Signs (Most Recent):  Temp: 99.5 °F (37.5 °C) (11/14/22 1556)  Pulse: (!) 114 (11/14/22 1556)  Resp: (!) 23 (11/14/22 1556)  BP: 92/62 (11/14/22 1556)  SpO2: (!) 92 % (11/14/22 1556)   Vital Signs (24h Range):  Temp:  [99.5 °F (37.5 °C)] 99.5 °F (37.5 °C)  Pulse:  [111-126] 114  Resp:  [18-25] 23  SpO2:  [81 %-97 %] 92 %  BP: (81-97)/(51-64) 92/62     Weight: 72.6 kg (160 lb)  Body mass index is 28.34 kg/m².    Physical Exam  Vitals and nursing note reviewed.   Constitutional:       General: He is in acute distress.      Appearance: He is well-developed. He is ill-appearing.   HENT:      Head: Atraumatic.      Mouth/Throat:      Mouth: Mucous membranes are dry.   Neck:      Vascular: No JVD.   Cardiovascular:      Rate and Rhythm: Regular rhythm. Tachycardia present.      Heart sounds: Normal heart sounds. No murmur heard.    No gallop.   Pulmonary:      Effort: Respiratory distress present.      Breath sounds: Normal breath sounds. No wheezing.      Comments: On supplemental oxygen, left lower lung rhonchi noted  Abdominal:      General: Bowel sounds are normal. There is no distension.      Palpations: Abdomen is soft.      Tenderness: There is no guarding or rebound.   Musculoskeletal:      Cervical back: Neck supple.      Right lower leg: Edema present.      Left lower leg: Edema present.   Lymphadenopathy:      Cervical: No cervical adenopathy.   Skin:     General: Skin is warm and dry.      Capillary Refill: Capillary refill takes less than 2 seconds.      Findings: No rash.      Comments: Scalp laceration noted, no active bleeding   Neurological:      Mental Status: He is alert and oriented to person, place, and time. Mental status is at baseline.      Cranial Nerves: No  cranial nerve deficit.   Psychiatric:         Behavior: Behavior normal.           Significant Labs: All pertinent labs within the past 24 hours have been reviewed.    Significant Imaging: I have reviewed all pertinent imaging results/findings within the past 24 hours.    Assessment/Plan:     77-year-old gentleman with prior history of diabetes, hypertension, Lewy body dementia was brought to emergency room after encountering fall, according to wife for last 1 week he has severely deconditioned, coughing, falling frequent and has extremely poor p.o. intake    Active Hospital Problems    Diagnosis  POA    *Sepsis [A41.9]  Yes     Priority: 1 - High    Left lung Pneumonia [J18.9]  Yes     Priority: 2     Hyperglycemia [R73.9]  Yes     Priority: 3     NICOLE (acute kidney injury) [N17.9]  Yes     Priority: 3     Hypomagnesemia [E83.42]  Yes     Priority: 5     Hyponatremia [E87.1]  Yes     Priority: 6     Thrombocytopenia [D69.6]  Yes     Priority: 6     Frequent falls [R29.6]  Not Applicable     Priority: 7     Elevated troponin [R77.8]  Yes     Priority: 7     Major neurocognitive disorder [F03.90]  Yes     MMSE 22/30 Jan 2022      Gait disturbance [R26.9]  Yes    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       Plan:  -Admit to ICU- inpatient  -Overall picture consistent with sepsis likely due to left lung pneumonia, severe hyperglycemia.  NICOLE and elevated troponin likely due to sepsis  -Continue sepsis protocol, check repeat lactic acid,  Rapid COVID, CPK, CK-MB.  Upon my exam his blood pressure is acceptable, has good tissue perfusion  -Aggressive IV hydration, aggressive electrolyte replacement  -Rocephin and doxycycline  -Insert Gilman, accurate charting of urine output  -Stat 10 units regular insulin, frequent Accu-Cheks,  will start basal bolus insulin regimen  -Serial EKGs, cardiac enzymes, telemetry monitoring. , 2D echo elevated troponin likely due to sepsis, not a good candidate for anticoagulation/ cardiac workup considering thrombocytopenia  -Resume essential home medications  -Instructed ER physician to address skin tear on scalp  -Follow-up on blood cultures, send UA, sputum culture  -If no improvement in kidney functions we may pursue renal ultrasound tomorrow a.m.  -Fall precautions, aspiration precautions, seizure precautions  -Updated wife and daughter at bedside, remains critically ill with potential of further clinical decline  -Further management as per clinical course      VTE Risk Mitigation (From admission, onward)         Ordered     enoxaparin injection 30 mg  Daily         11/14/22 1730     IP VTE HIGH RISK PATIENT  Once         11/14/22 1723     Place sequential compression device  Until discontinued         11/14/22 1723                   Gabriele Prabhakar MD  Department of Hospital Medicine   [facility]

## 2022-11-15 PROBLEM — R73.9 HYPERGLYCEMIA: Status: RESOLVED | Noted: 2022-01-01 | Resolved: 2022-01-01

## 2022-11-15 PROBLEM — E83.42 HYPOMAGNESEMIA: Status: RESOLVED | Noted: 2022-01-01 | Resolved: 2022-01-01
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
## 2022-11-15 PROBLEM — A41.9 SEPSIS: Status: RESOLVED | Noted: 2022-01-01 | Resolved: 2022-01-01

## 2022-11-15 NOTE — NURSING
Pt admitted to ICU room 3022, upon initial assessment patient confused and only oriented to self. Connected to oxygen and cardiac monitor, patient tachycardiac. No complaints at this time.    Unable to complete admit assessment as patient is confused and speech is intermittently slurred/incomprehensible       Skin assessment: generalized bruising, wound to head, and wound to sacral area/buttocks

## 2022-11-15 NOTE — CONSULTS
77 yr old male  confused  and pulling at things   Pictures per nursing  thank you   Sacral  bial  3x3x.2    Scalp 2x2x.2     Recommendation:   Head  Clean with chlorhexidine/ns.  Pat dry. Apply Medihoney Cover with large band aid.    Turn reposition q2 as pt's condition will allow.  Bilat heel pillows   Float and elevate heels of bed with pillows.  air mattress   Buttock  Clean area with chlorhexidine/ns. Pat dry. Apply Triad and place on an air flow pad daily

## 2022-11-15 NOTE — CONSULTS
[facility]  Adult Nutrition   Consult Note (Initial Assessment)     SUMMARY     Recommendations  1) Continue current 1800 kcal ADA diet as tolerated and encourage intake.   2) RD has added Glucerna with meals and MARYCRUZ BID to assist in meeting needs when intake is insufficient.    Goals:   Pt to meet 100% of his EEN and EPN so his wounds will heal.    Nutrition Goal Status: goal not met    Communication of RD Recs: reviewed with RN    Dietitian Rounds Brief  Consult for Wounds: Pt with dementia and not eating well...have added Glucerna and MARYCRUZ and will follow up to see how he does. Messaged pts nurse but have not heard back. Will follow up tomorrow.    Reason for Consult:  Sepsis, pneumonia     HPI:     Bret Garcia is a 77 y.o. male with a history of Lewy body dementia, bipolar disorder who has been having multiple falls at home, worsening status over the last week, poor oral intake, coughing, and was brought to the ED yesterday with another fall, with scalp trauma, found to have severe hyperglycemia, abnormal CXR, CT C/A/P, hypoxemia, lactic acidosis, NICOLE, procalcitonin 1.5, hypotension. He was hydrated, cultured and placed on rocephin and doxycycline. His blood pressure, lactic acid are improved today but cxr a bit worse(after hydration) , WBC are higher, and rocephin was escalated to meropenem. He has not yet had a lawson successfully placed. He is voiding. PVR pending.     Diet order:   Current Diet Order: 1800 kcal ADA      Evaluation of Received Nutrient/Fluid Intake  Energy Calories Required: not meeting needs  Protein Required: not meeting needs  Fluid Required: meeting needs  Tolerance: not tolerating     % Intake of Estimated Energy Needs: 25 - 50 %  % Meal Intake: 0 - 25 %      Intake/Output Summary (Last 24 hours) at 11/15/2022 1617  Last data filed at 11/14/2022 2339  Gross per 24 hour   Intake 609 ml   Output --   Net 609 ml        Anthropometrics  Temp: 98.4 °F (36.9 °C)  Height  "Method: Stated  Height: 5' 3" (160 cm)  Height (inches): 63 in  Weight Method: Bed Scale  Weight: 76.5 kg (168 lb 10.4 oz)  Weight (lb): 168.65 lb  Ideal Body Weight (IBW), Male: 124 lb  % Ideal Body Weight, Male (lb): 136.01 %  BMI (Calculated): 29.9  BMI Grade: 25 - 29.9 - overweight       Estimated/Assessed Needs  Weight Used For Calorie Calculations: 76.2 kg (167 lb 15.9 oz)  Energy Calorie Requirements (kcal): 3414-3573 kcals/day  Energy Need Method: Kcal/kg  Protein Requirements:  g/day  Weight Used For Protein Calculations: 57 kg (125 lb 10.6 oz)     Estimated Fluid Requirement Method: RDA Method  RDA Method (mL): 1905       Reason for Assessment  Reason For Assessment: consult  Diagnosis: infection/sepsis  Relevant Medical History: GERD (gastroesophageal reflux disease), Hypertension, Hyperlipidemia, Personal history of malignant neoplasm of prostate, Diabetes mellitus, Colon polyp, Elevated PSA, Incontinence of urine, Neuropathy, Personal history of malignant neoplasm of large intestine, colon; prostate  Pneumonia, SBO (small bowel obstruction), Suspected COVID-19 virus infection, Major neurocognitive disorder, Bipolar disorder, Other specified hypothyroidism    Nutrition/Diet History  Spiritual, Cultural Beliefs, Yazdanism Practices, Values that Affect Care: no  Food Allergies: NKFA  Factors Affecting Nutritional Intake: decreased appetite    Nutrition Risk Screen  Nutrition Risk Screen: no indicators present       Altered Skin Integrity 11/15/22 0000 Sacral spine #1 Traumatic-Wound Image: Images linked       Altered Skin Integrity 11/15/22 0000 Parietal region Traumatic-Wound Image: Images linked  MST Score: 0  Have you recently lost weight without trying?: No  Weight loss score: 0  Have you been eating poorly because of a decreased appetite?: No  Appetite score: 0       Weight History:  Wt Readings from Last 5 Encounters:   11/15/22 76.5 kg (168 lb 10.4 oz)   11/15/22 76.2 kg (168 lb)   10/24/22 " 74.6 kg (164 lb 7.4 oz)   09/28/22 75.1 kg (165 lb 8 oz)   07/19/22 74.7 kg (164 lb 9.6 oz)        Lab/Procedures/Meds: Pertinent Labs/Meds Reviewed    Medications:Pertinent Medications Reviewed  Scheduled Meds:   aspirin  325 mg Oral Daily    atorvastatin  40 mg Oral QHS    cefTRIAXone (ROCEPHIN) IVPB  2 g Intravenous Q24H    chlorhexidine  15 mL Mouth/Throat BID    doxycycline  100 mg Oral Q12H    enoxparin  30 mg Subcutaneous Daily    insulin detemir U-100  10 Units Subcutaneous QHS    levothyroxine  75 mcg Oral Before breakfast    mupirocin   Nasal BID    oxybutynin  10 mg Oral Daily    pantoprazole  40 mg Oral Daily    polyethylene glycol  17 g Oral BID    potassium, sodium phosphates  2 packet Oral QID (AC & HS)     Continuous Infusions:  PRN Meds:.acetaminophen, acetaminophen, dextrose 10%, dextrose 10%, glucagon (human recombinant), glucose, glucose, insulin aspart U-100, iohexoL, magnesium oxide, magnesium oxide, melatonin, naloxone, ondansetron, potassium bicarbonate, potassium bicarbonate, potassium bicarbonate, potassium, sodium phosphates, potassium, sodium phosphates, potassium, sodium phosphates, simethicone, sodium chloride 0.9%, DIPH,PERTUSS(ACELL),TET VACCINE (ADULT)(BOOSTRIX,ADACEL)    Labs: Pertinent Labs Reviewed  Clinical Chemistry:  Recent Labs   Lab 11/14/22  1220 11/14/22  2056 11/15/22  0430   * 130* 132*   K 3.6 3.3* 4.0   CL 88* 92* 98   CO2 22* 25 24   * 232*  232* 246*   BUN 37* 37* 39*   CREATININE 3.1* 2.7* 2.7*   CALCIUM 8.9 8.8 8.9   PROT 6.5  --  5.9*   ALBUMIN 3.1* 2.9* 2.9*   BILITOT 1.2*  --  0.9   ALKPHOS 90  --  85   AST 42*  --  39   ALT 27  --  28   ANIONGAP 15 13 10   MG 1.4*  --  1.7   PHOS 3.1 2.4* 3.0     CBC:   Recent Labs   Lab 11/15/22  0430   WBC 32.41*   RBC 3.74*   HGB 11.5*   HCT 33.7*   PLT 85*   MCV 90   MCH 30.7   MCHC 34.1     Cardiac Profile:  Recent Labs   Lab 11/14/22  1220 11/14/22  1434 11/14/22  1634 11/15/22  0430   CPK  --  131  --   --     CPKMB  --   --  22.3*  --    TROPONINI 0.292*  --  0.244* 0.226*       Monitor and Evaluation  Food and Nutrient Intake: energy intake, food and beverage intake  Food and Nutrient Adminstration: diet order  Knowledge/Beliefs/Attitudes: food and nutrition knowledge/skill, beliefs and attitudes  Physical Activity and Function: nutrition-related ADLs and IADLs, factors affecting access to physical activity  Anthropometric Measurements: weight, weight change, body mass index  Biochemical Data, Medical Tests and Procedures: lipid profile, inflammatory profile, glucose/endocrine profile, electrolyte and renal panel, gastrointestinal profile  Nutrition-Focused Physical Findings: overall appearance     Nutrition Risk  Level of Risk/Frequency of Follow-up: high     Nutrition Follow-Up  RD Follow-up?: Yes      Marina Barnes RD 11/15/2022 4:17 PM

## 2022-11-15 NOTE — PLAN OF CARE
Problem: Skin Injury Risk Increased  Goal: Skin Health and Integrity  Intervention: Promote and Optimize Oral Intake  Flowsheets (Taken 11/15/2022 1616)  Oral Nutrition Promotion: calorie-dense liquids provided     Problem: Oral Intake Inadequate  Goal: Improved Oral Intake  Outcome: Ongoing, Progressing  Intervention: Promote and Optimize Oral Intake  Flowsheets (Taken 11/15/2022 1616)  Oral Nutrition Promotion: calorie-dense liquids provided

## 2022-11-15 NOTE — NURSING
Spoke to pt's wife Shante on the  phone. Updated on POC. Spouse able to answer questions to complete admission profile.

## 2022-11-15 NOTE — PLAN OF CARE
[facility]  Initial Discharge Assessment       Primary Care Provider: Tab Cutler MD    Admission Diagnosis: Sepsis [A41.9]    Admission Date: 11/14/2022  Expected Discharge Date:     Discharge Barriers Identified: None    Initial assessment at bedside with patient, spouse Jennifer and daughter Chelsea. Patient does not have home services at this time but spouse requesting home health and home NP at discharge.    Payor: MEDICARE / Plan: MEDICARE PART A & B / Product Type: Government /     Extended Emergency Contact Information  Primary Emergency Contact: RadhaJennifer  Address: [address] [place] [address] [facility]  Home Phone: [phone]  Mobile Phone: [phone]  Relation: Spouse  Preferred language: English   needed? No  Secondary Emergency Contact: RadhaNickon  Mobile Phone: [phone]  Relation: Son  Preferred language: English   needed? No    Discharge Plan A: Home Health  Discharge Plan B: Home Health      [id number] [place] - [address]  [address] [place] [address]  Phone: [phone] Fax: [phone]    [facility] - [place], [place] - [address] [place] [id number]  Phone: [phone] Fax: [phone]      Initial Assessment (most recent)       Adult Discharge Assessment - 11/15/22 1623          Discharge Assessment    Assessment Type Discharge Planning Assessment     Confirmed/corrected address, phone number and insurance Yes     Confirmed Demographics Correct on Facesheet     Source of Information family;patient     When was your last doctors appointment? --   Sept 2022    Reason For Admission sepsis     Lives With spouse     Facility Arrived From: home     Do you expect to return to your current living situation? Yes     Do you have help at home or someone to help you manage your care at home? Yes     Who are your caregiver(s) and their phone  number(s)? Jennifer (spouse) [phone]     Walking or Climbing Stairs Difficulty ambulation difficulty, requires equipment     Mobility Management cane, walker     Do you have any problems with: Needs other help     Specify other help Jennifer (spouse) [phone]     Home Layout Able to live on 1st floor     Equipment Currently Used at Home cane, straight;walker, rolling     Readmission within 30 days? No     Patient currently being followed by outpatient case management? No     Do you currently have service(s) that help you manage your care at home? No     Do you take prescription medications? Yes     Do you have prescription coverage? Yes     Coverage [facility]     Do you have any problems affording any of your prescribed medications? No     Is the patient taking medications as prescribed? yes     Who is going to help you get home at discharge? Jennifer (spouse) [phone]     How do you get to doctors appointments? family or friend will provide     Are you on dialysis? No     Do you take coumadin? No     Discharge Plan A Home Health     Discharge Plan B Home Health     DME Needed Upon Discharge  other (see comments)   TBD    Discharge Plan discussed with: Spouse/sig other;Adult children;Patient     Discharge Barriers Identified None        Physical Activity    On average, how many days per week do you engage in moderate to strenuous exercise (like a brisk walk)? 0 days     On average, how many minutes do you engage in exercise at this level? 0 min        Financial Resource Strain    How hard is it for you to pay for the very basics like food, housing, medical care, and heating? Not hard at all        Housing Stability    In the last 12 months, was there a time when you were not able to pay the mortgage or rent on time? No     In the last 12 months, how many places have you lived? 1     In the last 12 months, was there a time when you did not have a steady place to sleep or slept in a shelter (including now)? No         Transportation Needs    In the past 12 months, has lack of transportation kept you from medical appointments or from getting medications? No     In the past 12 months, has lack of transportation kept you from meetings, work, or from getting things needed for daily living? No        Food Insecurity    Within the past 12 months, you worried that your food would run out before you got the money to buy more. Never true     Within the past 12 months, the food you bought just didn't last and you didn't have money to get more. Never true        Stress    Do you feel stress - tense, restless, nervous, or anxious, or unable to sleep at night because your mind is troubled all the time - these days? Not at all        Social Connections    In a typical week, how many times do you talk on the phone with family, friends, or neighbors? More than three times a week     How often do you get together with friends or relatives? More than three times a week     How often do you attend Anabaptist or Mormonism services? Never     Do you belong to any clubs or organizations such as Anabaptist groups, unions, fraternal or athletic groups, or school groups? No     How often do you attend meetings of the clubs or organizations you belong to? Never     Are you , , , , never , or living with a partner?         Alcohol Use    Q1: How often do you have a drink containing alcohol? Never     Q2: How many drinks containing alcohol do you have on a typical day when you are drinking? Patient does not drink     Q3: How often do you have six or more drinks on one occasion? Never        Relationship/Environment    Name(s) of Who Lives With Patient Jennifer (spouse) [phone]

## 2022-11-15 NOTE — ED NOTES
Admitted to floor. Diagnosis, medications, & POC discussed with patient. Patient verbalized understanding. All questions and concerns answered. No needs expressed at the time. Pt is awake, alert and oriented with no acute distress noted. Respirations even and unlabored. Per stretcher out of ED to floor with RN.

## 2022-11-15 NOTE — PROGRESS NOTES
Pharmacist Renal Dose Adjustment Note    Bret Garcia is a 77 y.o. male being treated with the medication Meropenem    Patient Data:    Vital Signs (Most Recent):  Temp: 98.6 °F (37 °C) (11/15/22 0709)  Pulse: (!) 115 (11/15/22 0700)  Resp: (!) 32 (11/15/22 0700)  BP: 106/68 (11/15/22 0700)  SpO2: (!) 93 % (11/15/22 0700)   Vital Signs (72h Range):  Temp:  [98 °F (36.7 °C)-99.5 °F (37.5 °C)]   Pulse:  [108-127]   Resp:  [18-53]   BP: ()/(51-76)   SpO2:  [81 %-97 %]      Recent Labs   Lab 11/14/22  1220 11/14/22  2056 11/15/22  0430   CREATININE 3.1* 2.7* 2.7*     Serum creatinine: 2.7 mg/dL (H) 11/15/22 0430  Estimated creatinine clearance: 21.3 mL/min (A)    Medication:Meropenem dose: 1 gm frequency q8h will be changed to medication:Meropenem dose:500 mg frequency:q12h    Pharmacist's Name: Matilde Beard  Pharmacist's Extension: [phone]

## 2022-11-15 NOTE — PROGRESS NOTES
[facility] Medicine  Progress Note    Patient name: Bret Garcia  MRN: [id number]  Admit Date: 11/14/2022   LOS: 1 day     SUBJECTIVE:     Principal problem: Sepsis    Interval History:  Patient was seen and examined bedside.  Sepsis resolved, acceptable blood pressure, still remains tachycardic, has worsening leukocytosis.     Scheduled Meds:   aspirin  325 mg Oral Daily    atorvastatin  40 mg Oral QHS    cefTRIAXone (ROCEPHIN) IVPB  2 g Intravenous Q24H    chlorhexidine  15 mL Mouth/Throat BID    doxycycline  100 mg Oral Q12H    enoxparin  30 mg Subcutaneous Daily    insulin detemir U-100  10 Units Subcutaneous QHS    levothyroxine  75 mcg Oral Before breakfast    mupirocin   Nasal BID    oxybutynin  10 mg Oral Daily    pantoprazole  40 mg Oral Daily    polyethylene glycol  17 g Oral BID     Continuous Infusions:      PRN Meds:acetaminophen, acetaminophen, dextrose 10%, dextrose 10%, glucagon (human recombinant), glucose, glucose, insulin aspart U-100, iohexoL, magnesium oxide, magnesium oxide, melatonin, naloxone, ondansetron, potassium bicarbonate, potassium bicarbonate, potassium bicarbonate, potassium, sodium phosphates, potassium, sodium phosphates, potassium, sodium phosphates, simethicone, sodium chloride 0.9%, DIPH,PERTUSS(ACELL),TET VACCINE (ADULT)(BOOSTRIX,ADACEL)    Review of patient's allergies indicates:   Allergen Reactions    Codeine Other (See Comments) and Hallucinations     Other reaction(s): Rash  hallucinations    Penicillin     Penicillins Other (See Comments)     Other reaction(s): Shortness of breath  Other reaction(s): Itching  Unknown/as a child       Review of Systems: As per interval history    OBJECTIVE:     Vital Signs (Most Recent)  Temp: 98.4 °F (36.9 °C) (11/15/22 1526)  Pulse: (!) 115 (11/15/22 1700)  Resp: (!) 30 (11/15/22 1700)  BP: 105/77 (11/15/22 1700)  SpO2: 96 % (11/15/22 1700)    Vital Signs Range (Last 24H):  Temp:  [98 °F (36.7 °C)-98.7 °F (37.1  °C)]   Pulse:  [108-139]   Resp:  [23-53]   BP: ()/(61-82)   SpO2:  [88 %-97 %]     I & O (Last 24H):  Intake/Output Summary (Last 24 hours) at 11/15/2022 1709  Last data filed at 11/15/2022 1705  Gross per 24 hour   Intake 1976.5 ml   Output --   Net 1976.5 ml       Physical Exam:  General:  Chronically ill-appearing  Eyes: No conjunctival injection. No scleral icterus.  ENT: Hearing grossly intact. No discharge from ears. No nasal discharge.   Neck: Supple, trachea midline. No JVD  CVS:  Tachycardia, RRR. No LE edema BL  Lungs:  On supplemental oxygen, bilateral crackles noted,  No tachypnea or accessory muscle use.    Abdomen:  Soft, nontender and nondistended.  No organomegaly  Neuro: Alert. Moves all extremities. Follows commands. Responds appropriately   Skin:  Scalp abrasion noted    Laboratory:  I have reviewed all pertinent lab results within the past 24 hours.    Diagnostic Results:  Reviewed all imaging    ASSESSMENT/PLAN:     77-year-old gentleman with prior history of diabetes, hypertension, Lewy body dementia was brought to emergency room after encountering fall, according to wife for last 1 week he has severely deconditioned, coughing, falling frequent and has extremely poor p.o. 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  Repeat chest x-ray looks worse  Escalate antibiotic regimen to meropenem and doxycycline  Consulted infectious disease-further antibiotics titration as per ID  Stop IV fluids considering pulmonary congestion on chest x-ray  Instructed nursing staff to attempt Gilman, p.r.n. bladder scans  Basal bolus insulin regimen, Accu-Cheks, hypoglycemia measures  Follow-up on infectious workup  Renal ultrasound  2D echo findings noted  If no improvement in kidney function we may pursue nephrology consult tomorrow  Fall precautions, seizure precautions, aspiration precautions  No family available at bedside  Continue current orders  Remains critically ill        VTE Risk Mitigation (From admission, onward)           Ordered     enoxaparin injection 30 mg  Daily         11/14/22 1749     IP VTE HIGH RISK PATIENT  Once         11/14/22 1723     Place sequential compression device  Until discontinued         11/14/22 1723                        Department Hospital Medicine  [facility]  Gabriele Prabhakar MD  Date of service: 11/15/2022

## 2022-11-15 NOTE — CONSULTS
Consult Note  Infectious Disease    Reason for Consult:  Sepsis, pneumonia    HPI: Bret Garcia is a 77 y.o. male with a history of Lewy body dementia, bipolar disorder who has been having multiple falls at home, worsening status over the last week, poor oral intake, coughing, and was brought to the ED yesterday with another fall, with scalp trauma, found to have severe hyperglycemia, abnormal CXR, CT C/A/P, hypoxemia, lactic acidosis, NICOLE, procalcitonin 1.5, hypotension. He was hydrated, cultured and placed on rocephin and doxycycline. His blood pressure, lactic acid are improved today but cxr a bit worse(after hydration) , WBC are higher, and rocephin was escalated to meropenem. He has not yet had a lawson successfully placed. He is voiding. PVR pending.     Review of patient's allergies indicates:   Allergen Reactions    Codeine Other (See Comments) and Hallucinations     Other reaction(s): Rash  hallucinations    Penicillin     Penicillins Other (See Comments)     Other reaction(s): Shortness of breath  Other reaction(s): Itching  Unknown/as a child     Past Medical History:   Diagnosis Date    Bipolar disorder 1/31/2022    Colon polyp     Diabetes mellitus     Elevated PSA     GERD (gastroesophageal reflux disease)     Hyperlipidemia     Hypertension     Incontinence of urine     Major neurocognitive disorder 1/31/2022    MMSE 22/30 Jan 2022    Neuropathy     Other specified hypothyroidism 6/15/2021    Personal history of malignant neoplasm of large intestine     colon; prostate    Personal history of malignant neoplasm of prostate     Pneumonia 4/7/2020    SBO (small bowel obstruction) 11/7/2017    Suspected COVID-19 virus infection 4/7/2020     Past Surgical History:   Procedure Laterality Date    APPENDECTOMY      COLON SURGERY  2005    resection    PROSTATE SURGERY  2006    prostatectomy    ROTATOR CUFF REPAIR      Rt shoulder    SHOULDER SURGERY      TONSILLECTOMY, ADENOIDECTOMY       as a baby     Social History     Socioeconomic History    Marital status:    Occupational History    Occupation: retired   Tobacco Use    Smoking status: Never    Smokeless tobacco: Never   Substance and Sexual Activity    Alcohol use: No    Drug use: Not Currently    Sexual activity: Yes     Family History   Problem Relation Age of Onset    Heart disease Father     Alcohol abuse Father          Review of Systems:  unable to give a history    EXAM & DIAGNOSTICS REVIEWED:   Vitals:     Temp:  [98 °F (36.7 °C)-99.5 °F (37.5 °C)]   Temp: 98.6 °F (37 °C) (11/15/22 0709)  Pulse: (!) 126 (11/15/22 0910)  Resp: (!) 33 (11/15/22 0910)  BP: 109/71 (11/15/22 0900)  SpO2: (!) 88 % (11/15/22 0910)    Intake/Output Summary (Last 24 hours) at 11/15/2022 1055  Last data filed at 11/14/2022 2339  Gross per 24 hour   Intake 609 ml   Output --   Net 609 ml       General:  In NAD. Arousable, attends and cooperates. Unable to give a history. A little diaphoretic, does appear ill  Eyes:  Anicteric, PERRL, EOMI  ENT:  No ulcers, exudates, thrush, nares patent, dentures  Neck:  supple, no masses or adenopathy appreciated  Lungs: Clear, without consolidation, rales, wheezes, rub  Heart:  RRR, no gallop/murmur/rub noted  Abd:  Soft, NT, ND, normal BS, no masses or organomegaly appreciated.  :  Voids , no flank tenderness. Gilman to be attempted again  Musc:  Joints without effusion, swelling, erythema, synovitis, muscle wasting.   Skin:  No rashes. No palmar or plantar lesions. No subungual petechiae. Small abrasions on arms, one small cut beneath left great toe. Scalp abrasion, no hematoma  Neuro:             Alert, attentive, speech fluent but content is inaccurate , face symmetric when he smiles, moves all extremities, no focal weakness.    Psych:  Calm, cooperative  Lymphatic:     No cervical, supraclavicular,   or inguinal nodes  Extrem: No edema, erythema, phlebitis, cellulitis, warm and well  perfused  VAD:  peripheral     Isolation:  none  Wound:                   General Labs reviewed:  Recent Labs   Lab 11/14/22  1612 11/15/22  0430   WBC 26.22* 32.41*   HGB 10.6* 11.5*   HCT 31.7* 33.7*   PLT 86* 85*       Recent Labs   Lab 11/14/22  1220 11/14/22  2056 11/15/22  0430   * 130* 132*   K 3.6 3.3* 4.0   CL 88* 92* 98   CO2 22* 25 24   BUN 37* 37* 39*   CREATININE 3.1* 2.7* 2.7*   CALCIUM 8.9 8.8 8.9   PROT 6.5  --  5.9*   BILITOT 1.2*  --  0.9   ALKPHOS 90  --  85   ALT 27  --  28   AST 42*  --  39     No results for input(s): CRP in the last 168 hours.      Micro:  Microbiology Results (last 7 days)       Procedure Component Value Units Date/Time    Blood culture x two cultures. Draw prior to antibiotics. [[id number]] Collected: 11/14/22 1253    Order Status: Completed Specimen: Blood from Peripheral, Wrist, Right Updated: 11/14/22 2117     Blood Culture, Routine No Growth to date    Narrative:      Aerobic and anaerobic    Culture, Respiratory with Gram Stain [[id number]]     Order Status: Sent Specimen: Sputum, Expectorated     Blood culture x two cultures. Draw prior to antibiotics. [[id number]] Collected: 11/14/22 1243    Order Status: Sent Specimen: Blood from Peripheral, Forearm, Left Updated: 11/14/22 1305            Imaging Reviewed:   CXRs   CT c/a/p  Degradation by motion.   Development of scattered groundglass type pulmonary opacities. See above.   Scattered arteriosclerosis including coronary arterial calcification.   Small persistent small bowel containing lower midline anterior abdominal wall hernia. There has been previous hernia repair surgery.    Cardiology:    IMPRESSION & PLAN   1. Sepsis syndrome.    Tiny infiltrate CLARY seen on CT and left base I obscured on plain film, but not present on physical exam   Lactic acid has normalized, no fever this morning   Leukocytosis is greater    2. NICOLE, urine output not yet quantified  3. Dementia, bipolar  4. Diabetes with severe  hyperglycemia         Recommendations:  Continue doxy and could hold at rocephin  Would pursue lawson, even if urology consult is needed  Check PVR    D/w Dr. Prabhakar    Medical Decision Making during this encounter was  [_] Low Complexity  [_] Moderate Complexity  [  xx] High Complexity

## 2022-11-15 NOTE — PLAN OF CARE
"          Dx: Sepsis    Shift Events: pt admitted to ICU for sepsis    Goals of Care: MAP>60    Neuro: Confused    Vital Signs: /76 (BP Location: Left arm, Patient Position: Lying)   Pulse (!) 119   Temp 98 °F (36.7 °C) (Oral)   Resp (!) 40   Ht 5' 3" (1.6 m)   Wt 76.5 kg (168 lb 10.4 oz)   SpO2 (!) 94%   BMI 29.88 kg/m²     Respiratory: Nasal Cannula    Diet: Diabetic Diet    Gtts: MIVF    Urine Output: Incontinent     Drains: none     Labs/Accuchecks: routine/serial lactics/ACHS and q2hr for 6 occurrences.    Skin: see flowsheet      "

## 2022-11-16 PROBLEM — A41.9 SEPSIS WITH ACUTE RENAL FAILURE WITHOUT SEPTIC SHOCK: Status: ACTIVE | Noted: 2022-01-01

## 2022-11-16 PROBLEM — N17.9 SEPSIS WITH ACUTE RENAL FAILURE WITHOUT SEPTIC SHOCK: Status: ACTIVE | Noted: 2022-01-01

## 2022-11-16 PROBLEM — R65.20 SEPSIS WITH ACUTE RENAL FAILURE WITHOUT SEPTIC SHOCK: Status: ACTIVE | Noted: 2022-01-01

## 2022-11-16 NOTE — NURSING
Hospitalist contacted via phone regarding HR, no answer. Contacted via secure chat, awaiting orders.

## 2022-11-16 NOTE — ANESTHESIA PROCEDURE NOTES
Intubation    Date/Time: 11/16/2022 9:09 AM  Performed by: Jean Paul Wheeler MD  Authorized by: Jean Paul Wheeler MD     Intubation:     Intubated:  Postinduction    Mask Ventilation:  Not attempted    Attempts:  1    Attempted By:  Staff anesthesiologist    Method of Intubation:  Video laryngoscopy    Blade:  Glidescope 4    Laryngeal View Grade: Grade I - full view of cords      Difficult Airway Encountered?: No      Complications:  None    Airway Device:  Oral endotracheal tube    Airway Device Size:  8.0    Style/Cuff Inflation:  Cuffed (inflated to minimal occlusive pressure)    Inflation Amount (mL):  8    Tube secured:  21    Secured at:  The teeth    Placement Verified By:  Colorimetric ETCO2 device    Complicating Factors:  None    Findings Post-Intubation:  BS equal bilateral and atraumatic/condition of teeth unchanged  Notes:      Easy intubation   Pt tolerated well

## 2022-11-16 NOTE — PLAN OF CARE
22 1617   Final Note   Assessment Type Final Discharge Note   Anticipated Discharge Disposition

## 2022-11-16 NOTE — PLAN OF CARE
11/16/22 0741   Patient Assessment/Suction   Level of Consciousness (AVPU) responds to voice   Respiratory Effort Moderate;Labored   Expansion/Accessory Muscles/Retractions accessory muscle use   All Lung Fields Breath Sounds Anterior:;Lateral:;clear   Rhythm/Pattern, Respiratory tachypneic   Cough Frequency no cough   PRE-TX-O2   O2 Device (Oxygen Therapy) BiPAP   $ Is the patient on Low Flow Oxygen? Yes   Oxygen Concentration (%) 50   SpO2 96 %   Pulse Oximetry Type Continuous   $ Pulse Oximetry - Multiple Charge Pulse Oximetry - Multiple   Oximetry Probe Site Assessed;Intact   Pulse (!) 120   Resp (!) 33   Preset CPAP/BiPAP Settings   Mode Of Delivery BiPAP S/T   $ CPAP/BiPAP Daily Charge BiPAP/CPAP Daily   Size of Mask Medium/Large   Sized Appropriately? Yes   Equipment Type V60   Airway Device Type medium full face mask   Humidifier not applicable   Ipap 14   EPAP (cm H2O) 8   Pressure Support (cm H2O) 6   Set Rate (Breaths/Min) 14   ITime (sec) 0.8   Rise Time (sec) 2   Patient CPAP/BiPAP Settings   Timed Inspiration (Sec) 0.8   IPAP Rise Time (sec) 2   RR Total (Breaths/Min) 33   Tidal Volume (mL) 700   VE Minute Ventilation (L/min) 27 L/min   Peak Inspiratory Pressure (cm H2O) 16   TiTOT (%) 34   Total Leak (L/Min) 33   Patient Trigger - ST Mode Only (%) 97   CPAP/BiPAP Alarms   High Pressure (cm H2O) 50   Low Pressure (cm H2O) 10   Minute Ventilation (L/Min) 3   High RR (breaths/min) 50   Low RR (breaths/min) 10   Apnea (Sec) 20   Education   $ Education BiPAP;15 min   Respiratory Evaluation   $ Care Plan Tech Time 15 min   $ Eval/Re-eval Charges Evaluation   Evaluation For New Orders

## 2022-11-16 NOTE — PLAN OF CARE
"          Dx: Sepsis    Shift Events: pt tachycardic- EKG showed SVT, required 5 mg IV lopressor. Desatted, placed on BiPAP, ABG drawn and morning CXR done. Electrolytes replenished as necessary.     Goals of Care: MAP>60    Neuro: Confused    Vital Signs: BP (!) 90/54 (BP Location: Right arm, Patient Position: Lying)   Pulse (!) 123   Temp (!) 100.8 °F (38.2 °C) (Axillary)   Resp (!) 34   Ht 5' 3" (1.6 m)   Wt 76.2 kg (167 lb 15.9 oz)   SpO2 (!) 93%   BMI 29.76 kg/m²     Respiratory: BiPAP/CPAP    Diet: Diabetic Diet    Gtts: none    Urine Output: Incontinent      Drains: none     Labs/Accuchecks: routine/ACHS    Skin: see flowsheet        Problem: Adult Inpatient Plan of Care  Goal: Plan of Care Review  Outcome: Ongoing, Not Progressing  Goal: Patient-Specific Goal (Individualized)  Outcome: Ongoing, Not Progressing  Goal: Absence of Hospital-Acquired Illness or Injury  Outcome: Ongoing, Not Progressing  Goal: Readiness for Transition of Care  Outcome: Ongoing, Not Progressing     "

## 2022-11-16 NOTE — CONSULTS
Pulmonary/Critical Care Consult      Patient name: Bret Garcia  MRN: [id number]  Date: 11/16/2022    Admit Date: 11/14/2022  Consult Requested By: Gabriele Prabhakar MD    Reason for Consult: Respiratory failure    HPI:    11/16/2022 - 76 yo admitted with pneumonia, NICOLE and during the evening had worsened respiratory status with increased O2 needs and then BiPAP.  ABG around midnight showed adequate oxygenation but a respiratory alkalosis.  He has remained tachypnea.  He is not very responsive and not able to provide any history.  Also had some issues with ST.  I was asked to see early this AM.  Chart has been reviewed.  Temp increased earlier.  ID is following for antibiotics.  WBC is higher, platelets lower, renal function worse (renal consulted for NICOLE), BNP elevated but last ECHO showed good cardiac function.  Repeat ABG ordered and pending.  Pt is high risk for worsening requiring intubation and ventilation and will watch closely.    Review of Systems    Review of Systems   Unable to perform ROS: Acuity of condition     Past Medical History    Past Medical History:   Diagnosis Date    Bipolar disorder 1/31/2022    Colon polyp     Diabetes mellitus     Elevated PSA     GERD (gastroesophageal reflux disease)     Hyperlipidemia     Hypertension     Incontinence of urine     Major neurocognitive disorder 1/31/2022    MMSE 22/30 Jan 2022    Neuropathy     Other specified hypothyroidism 6/15/2021    Personal history of malignant neoplasm of large intestine     colon; prostate    Personal history of malignant neoplasm of prostate     Pneumonia 4/7/2020    SBO (small bowel obstruction) 11/7/2017    Suspected COVID-19 virus infection 4/7/2020       Past Surgical History    Past Surgical History:   Procedure Laterality Date    APPENDECTOMY      COLON SURGERY  2005    resection    PROSTATE SURGERY  2006    prostatectomy    ROTATOR CUFF REPAIR      Rt shoulder    SHOULDER SURGERY      TONSILLECTOMY, ADENOIDECTOMY      as a  baby       Medications (scheduled):      acetaminophen  650 mg Rectal Once    aspirin  325 mg Oral Daily    atorvastatin  40 mg Oral QHS    cefTRIAXone (ROCEPHIN) IVPB  2 g Intravenous Q24H    chlorhexidine  15 mL Mouth/Throat BID    doxycycline  100 mg Oral Q12H    enoxparin  30 mg Subcutaneous Daily    insulin detemir U-100  10 Units Subcutaneous BID    levothyroxine  75 mcg Oral Before breakfast    mupirocin   Nasal BID    oxybutynin  10 mg Oral Daily    pantoprazole  40 mg Oral Daily    polyethylene glycol  17 g Oral BID       Medications (infusions):         Medications (prn):     acetaminophen, acetaminophen, dextrose 10%, dextrose 10%, glucagon (human recombinant), glucose, glucose, insulin aspart U-100, iohexoL, magnesium oxide, magnesium oxide, melatonin, morphine, naloxone, ondansetron, potassium bicarbonate, potassium bicarbonate, potassium bicarbonate, potassium, sodium phosphates, potassium, sodium phosphates, potassium, sodium phosphates, simethicone, sodium chloride 0.9%, DIPH,PERTUSS(ACELL),TET VACCINE (ADULT)(BOOSTRIX,ADACEL)    Family History:   Family History   Problem Relation Age of Onset    Heart disease Father     Alcohol abuse Father        Social History: Tobacco:   Social History     Tobacco Use   Smoking Status Never   Smokeless Tobacco Never                                EtOH:   Social History     Substance and Sexual Activity   Alcohol Use No                                Drugs:   Social History     Substance and Sexual Activity   Drug Use Not Currently       Physical Exam    Vital signs:  Temp:  [97 °F (36.1 °C)-100.8 °F (38.2 °C)]   Pulse:  [114-141]   Resp:  [26-38]   BP: ()/(54-82)   SpO2:  [88 %-98 %]     Intake/Output:   Intake/Output Summary (Last 24 hours) at 11/16/2022 0815  Last data filed at 11/15/2022 1705  Gross per 24 hour   Intake 867.5 ml   Output --   Net 867.5 ml        BMI: Estimated body mass index is 29.76 kg/m² as calculated from the following:    Height as  "of 11/15/22: 5' 3" (1.6 m).    Weight as of this encounter: 76.2 kg (167 lb 15.9 oz).    Physical Exam  Vitals and nursing note reviewed.   Constitutional:       General: He is not in acute distress.     Appearance: He is obese. He is ill-appearing. He is not toxic-appearing or diaphoretic.      Comments: Wearing BiPAP   HENT:      Head: Normocephalic and atraumatic.      Right Ear: External ear normal.      Left Ear: External ear normal.      Nose: Nose normal.      Mouth/Throat:      Mouth: Mucous membranes are moist.      Pharynx: Oropharynx is clear. No oropharyngeal exudate.   Eyes:      General: No scleral icterus.        Right eye: No discharge.         Left eye: No discharge.      Extraocular Movements: Extraocular movements intact.      Conjunctiva/sclera: Conjunctivae normal.      Pupils: Pupils are equal, round, and reactive to light.   Neck:      Vascular: No carotid bruit.   Cardiovascular:      Rate and Rhythm: Regular rhythm. Tachycardia present.      Pulses: Normal pulses.      Heart sounds: Normal heart sounds. No murmur heard.    No friction rub. No gallop.   Pulmonary:      Effort: Pulmonary effort is normal. No respiratory distress.      Breath sounds: Normal breath sounds. No stridor. No wheezing, rhonchi or rales.      Comments: CTA anteriorly, some posterior rales  Chest:      Chest wall: No tenderness.   Abdominal:      General: There is no distension.      Tenderness: There is no abdominal tenderness. There is no guarding.      Comments: Hypoactive BS  Protuberant    Musculoskeletal:         General: No swelling. Normal range of motion.      Cervical back: Normal range of motion and neck supple. No rigidity or tenderness.      Right lower leg: Edema present.      Left lower leg: Edema present.   Lymphadenopathy:      Cervical: No cervical adenopathy.   Skin:     General: Skin is warm and dry.      Capillary Refill: Capillary refill takes less than 2 seconds.      Coloration: Skin is not " jaundiced.      Findings: No bruising.   Neurological:      General: No focal deficit present.      Mental Status: He is alert. Mental status is at baseline.      Cranial Nerves: No cranial nerve deficit.      Sensory: No sensory deficit.      Motor: No weakness.      Comments: Not very responsive  MENDEZ       Laboratory    Recent Labs   Lab 11/16/22  0323   WBC 40.53*   RBC 3.61*   HGB 11.1*   HCT 33.0*   PLT 68*   MCV 91   MCH 30.7   MCHC 33.6       Recent Labs   Lab 11/16/22 0323   CALCIUM 9.2   PROT 6.2   *   K 4.5   CO2 21*   CL 96   BUN 56*   CREATININE 3.1*   ALKPHOS 113   *   *   BILITOT 1.5*       No results for input(s): PT, INR, APTT in the last 24 hours.    No results for input(s): CPK, CPKMB, TROPONINI, MB in the last 24 hours.    Additional labs: reviewed    Microbiology:       Microbiology Results (last 7 days)       Procedure Component Value Units Date/Time    Blood culture x two cultures. Draw prior to antibiotics. [[id number]] Collected: 11/14/22 1253    Order Status: Completed Specimen: Blood from Peripheral, Wrist, Right Updated: 11/15/22 1632     Blood Culture, Routine No Growth to date      No Growth to date    Narrative:      Aerobic and anaerobic    Culture, Respiratory with Gram Stain [[id number]]     Order Status: Sent Specimen: Sputum, Expectorated     Blood culture x two cultures. Draw prior to antibiotics. [[id number]] Collected: 11/14/22 1243    Order Status: Canceled Specimen: Blood from Peripheral, Forearm, Left             Radiology    X-Ray Chest 1 View  HISTORY: follow up pneumonia    FINDINGS: Portable chest radiograph at 0509 hours compared to 11/15/2022 shows stable enlarged cardiac silhouette and normal pulmonary vascularity.    The lungs are symmetrically inflated, with persistent scattered interstitial and airspace opacities in both mid and lower lung zones, with obscuration of left hemidiaphragm and probable left pleural effusion. No new pleural or  parenchymal abnormality.    IMPRESSION: No significant interval change.    Electronically signed by:  Theodore Queen MD  11/16/2022 6:55 AM CST Workstation: [id number]        Additional Studies    reviewed    Ventilator Information    Oxygen Concentration (%):  [40-50] 50         Recent Labs     11/16/22  0042   PH 7.589*   PCO2 24.2*   PO2 76*   HCO3 23.2*   POCSATURATED 97   BE 1         Impression    Active Hospital Problems    Diagnosis  POA    Fall [W19.XXXA]  Yes    Left lung Pneumonia [J18.9]  Yes    Frequent falls [R29.6]  Not Applicable    Hyponatremia [E87.1]  Yes    Thrombocytopenia [D69.6]  Yes    Elevated troponin [R77.8]  Yes    Major neurocognitive disorder [F03.90]  Yes     MMSE 22/30 Jan 2022      Gait disturbance [R26.9]  Yes    NICOLE (acute kidney injury) [N17.9]  Yes    Type 2 diabetes mellitus with obesity [E11.69, E66.9]  Yes    History of prostate cancer [Z85.46]  Not Applicable    History of colon cancer [Z85.038]  Yes      Resolved Hospital Problems    Diagnosis Date Resolved POA    *Sepsis [A41.9] 11/15/2022 Yes    Hyperglycemia [R73.9] 11/15/2022 Yes    Hypomagnesemia [E83.42] 11/15/2022 Yes       Plan    Continue BiPAP for now, repeat ABG pending and we will have low threshold for intubation  Broaden antibiotics (d/w Dr Prabhakar)  Pressors as needed  Await cultures  To get dopplers of legs and abdominal US  Urology to see to help place a lawson  Watch platelets  Follow LFT  Prognosis is guarded at best and he has high risk for decompensation    Thank you for this consult.  I will follow with you while the patient is hospitalized.      Critical Care Time    I have spent > 35 minutes providing critical care services for this pt for the above diagnoses.  These services have included pt evaluation, pt exam, BiPAP assessment, discussions with staff, chart review, data review, note preparation and .  Medications have been reviewed and adjusted as needed.  The patient has life threatening  illness with a high risk of decompensation and/or death.      Jean Paul Noel MD  [facility] Pulmonary/Critical Care

## 2022-11-16 NOTE — ANESTHESIA PROCEDURE NOTES
Central Line    Diagnosis: Inadequate IV access  Patient location during procedure: ICU  Timeout: 11/16/2022 9:16 AM  Procedure end time: 11/16/2022 9:27 AM    Staffing  Authorizing Provider: Jean Paul Wheeler MD  Performing Provider: Jean Paul Wheeler MD    Staffing  Performed: anesthesiologist   Anesthesiologist: Jean Paul Wheeler MD  Anesthesiologist was present at the time of the procedure.  Preanesthetic Checklist  Completed: patient identified, risks and benefits discussed, monitors and equipment checked, pre-op evaluation, timeout performed and anesthesia consent given  Indication   Indication: vascular access, med administration     Anesthesia   local infiltration    Central Line   Skin Prep: skin prepped with ChloraPrep, skin prep agent completely dried prior to procedure  Sterile Barriers Followed: Yes    All five maximal barriers used- gloves, gown, cap, mask, and large sterile sheet    hand hygiene performed prior to central venous catheter insertion  Location: right internal jugular.   Catheter type: triple lumen  Catheter Size: 7 Fr  Inserted Catheter Length: 15 cm  Ultrasound: vascular probe with ultrasound   Vessel Caliber: medium, patent  Vascular Doppler:  not done, compressibility normal  Needle advanced into vessel with real time Ultrasound guidance.  Guidewire confirmed in vessel.  sterile gel and probe cover used in ultrasound-guided central venous catheter insertion  Manometry: none  Insertion Attempts: 1   Securement:line sutured, chlorhexidine patch, sterile dressing applied and blood return through all ports    Post-Procedure   X-Ray: successful placement   Adverse Events:none      Guidewire Guidewire removed intact. Guidewire removed intact, verified with nurse.  Additional Notes  No CXR obtained

## 2022-11-16 NOTE — PROGRESS NOTES
VANCOMYCIN PHARMACOKINETIC NOTE:  Vancomycin Day # 1    Objective/Assessment:    Diagnosis/Indication for Vancomycin: Sepsis     77 y.o., male; Actual Body Weight = 76.2 kg (167 lb 15.9 oz).    The patient has the following labs:  11/16/2022 Estimated Creatinine Clearance: 18.5 mL/min (A) (based on SCr of 3.1 mg/dL (H)). Lab Results   Component Value Date    BUN 56 (H) 11/16/2022     Lab Results   Component Value Date    WBC 40.53 (HH) 11/16/2022            Plan:  Adjust vancomycin dose and/or frequency based on the patient's actual weight and renal function:  Initiate Vancomycin 1500 mg IV x 1.  Orders have been entered into patient's chart.    Vancomycin dose = 19 mg/kg actual body weight    Vancomycin trough level has been ordered for 11/17 at 11:30.    Pharmacy will manage vancomycin therapy, monitor serum vancomycin levels, monitor renal function and adjust regimen as necessary.      Thank you for allowing us to participate in this patient's care.     Matilde Beard 11/16/2022 9:03 AM  Department of Pharmacy  Ext [phone]

## 2022-11-16 NOTE — PROGRESS NOTES
Consult Note  Infectious Disease    Reason for Consult:  Sepsis, pneumonia    HPI: Bret Garcia is a 77 y.o. male with a history of Lewy body dementia, bipolar disorder who has been having multiple falls at home, worsening status over the last week, poor oral intake, coughing, and was brought to the ED yesterday with another fall, with scalp trauma, found to have severe hyperglycemia, abnormal CXR, CT C/A/P, hypoxemia, lactic acidosis, NICOLE, procalcitonin 1.5, hypotension. He was hydrated, cultured and placed on rocephin and doxycycline. His blood pressure, lactic acid are improved today but cxr a bit worse(after hydration) , WBC are higher, and rocephin was escalated to meropenem. He has not yet had a lawson successfully placed. He is voiding. PVR pending.     11/16:  interim reviewed. Worsened from a respiratory standpoint and is now on bipap and will need pressors. CXR is no significant change. Tmax 100.8, WBC higher. Still unable to establish a lawson and urology has been consulted. AST >800 and ALT >400 today. CPK MB and troponin have been added. US of abdomen has been ordered. Procalcitonin is no change, suggesting elevation from NICOLE(at least in part)       EXAM & DIAGNOSTICS REVIEWED:   Vitals:     Temp:  [97 °F (36.1 °C)-100.8 °F (38.2 °C)]   Temp: 99.3 °F (37.4 °C) (11/16/22 0730)  Pulse: (!) 120 (11/16/22 0741)  Resp: (!) 33 (11/16/22 0741)  BP: (!) 109/56 (11/16/22 0700)  SpO2: 96 % (11/16/22 0741)    Intake/Output Summary (Last 24 hours) at 11/16/2022 0837  Last data filed at 11/15/2022 1705  Gross per 24 hour   Intake 867.5 ml   Output --   Net 867.5 ml       General:  Does not attend, bipap in place  Eyes:  Anicteric, PERRL, EOMI  ENT:  No ulcers, exudates, thrush, nares patent, dentures  Neck:  supple, no masses or adenopathy appreciated  Lungs: Clear, without consolidation, rales, wheezes, rub  Heart:  RRR, no gallop/murmur/rub noted  Abd:  Soft, NT, ND, normal BS, no masses or organomegaly  appreciated.  :  Voids , no flank tenderness. Gilman to be attempted again  Musc:  Joints without effusion, swelling, erythema, synovitis, muscle wasting.   Skin:  No rashes. No palmar or plantar lesions. No subungual petechiae. Small abrasions on arms, one small cut beneath left great toe. Scalp abrasion, no hematoma  Neuro:             Bipap on, not interactive  Psych:     Lymphatic:     No cervical, supraclavicular,   or inguinal nodes  Extrem: No edema, erythema, phlebitis, cellulitis, warm and well perfused  VAD:  peripheral     Isolation:  none  Wound:                   General Labs reviewed:  Recent Labs   Lab 11/14/22  1612 11/15/22  0430 11/16/22  0323   WBC 26.22* 32.41* 40.53*   HGB 10.6* 11.5* 11.1*   HCT 31.7* 33.7* 33.0*   PLT 86* 85* 68*       Recent Labs   Lab 11/14/22  1220 11/14/22  2056 11/15/22  0430 11/16/22  0323   * 130* 132* 131*   K 3.6 3.3* 4.0 4.5   CL 88* 92* 98 96   CO2 22* 25 24 21*   BUN 37* 37* 39* 56*   CREATININE 3.1* 2.7* 2.7* 3.1*   CALCIUM 8.9 8.8 8.9 9.2   PROT 6.5  --  5.9* 6.2   BILITOT 1.2*  --  0.9 1.5*   ALKPHOS 90  --  85 113   ALT 27  --  28 434*   AST 42*  --  39 835*     No results for input(s): CRP in the last 168 hours.      Micro:  Microbiology Results (last 7 days)       Procedure Component Value Units Date/Time    Blood culture x two cultures. Draw prior to antibiotics. [[id number]] Collected: 11/14/22 1253    Order Status: Completed Specimen: Blood from Peripheral, Wrist, Right Updated: 11/15/22 1632     Blood Culture, Routine No Growth to date      No Growth to date    Narrative:      Aerobic and anaerobic    Culture, Respiratory with Gram Stain [[id number]]     Order Status: Sent Specimen: Sputum, Expectorated     Blood culture x two cultures. Draw prior to antibiotics. [[id number]] Collected: 11/14/22 1243    Order Status: Canceled Specimen: Blood from Peripheral, Forearm, Left             Imaging Reviewed:   CXRs   CT c/a/p  Degradation by motion.    Development of scattered groundglass type pulmonary opacities. See above.   Scattered arteriosclerosis including coronary arterial calcification.   Small persistent small bowel containing lower midline anterior abdominal wall hernia. There has been previous hernia repair surgery.    Cardiology:    IMPRESSION & PLAN   1. Sepsis syndrome.    Tiny infiltrate CLARY seen on CT and left base I obscured on plain film, but not present on physical exam   Lactic acid has normalized, no fever this morning   Leukocytosis is greater    2. NICOLE, urine output not yet quantified  3. Dementia, bipolar  4. Diabetes with severe hyperglycemia         Recommendations:  Continue doxy and merrem   urology consult is needed for lawson   Sputum culture  Added CPK and troponin  Would repeat CT head when he has been stabilized post intubation  D/w Dr. Prabhakar    Medical Decision Making during this encounter was  [_] Low Complexity  [_] Moderate Complexity  [  xx] High Complexity

## 2022-11-16 NOTE — CARE UPDATE
11/16/22 0300   Patient Assessment/Suction   Level of Consciousness (AVPU) responds to pain   Respiratory Effort Mild   Expansion/Accessory Muscles/Retractions abdominal muscle use   PRE-TX-O2   O2 Device (Oxygen Therapy) BiPAP   $ Is the patient on Low Flow Oxygen? Yes   Oxygen Concentration (%) 50   SpO2 (!) 94 %   Pulse Oximetry Type Continuous   $ Pulse Oximetry - Single Charge Pulse Oximetry - Single   Pulse (!) 128   Resp (!) 36   /68   Ready to Wean/Extubation Screen   FIO2<=50 (chart decimal) 0.5   Preset CPAP/BiPAP Settings   Mode Of Delivery BiPAP S/T   Sized Appropriately? Yes   Equipment Type V60   Ipap 10   EPAP (cm H2O) 5   Pressure Support (cm H2O) 5   Set Rate (Breaths/Min) 15   ITime (sec) 0.9   Rise Time (sec) 3   Patient CPAP/BiPAP Settings   Timed Inspiration (Sec) 0.9   IPAP Rise Time (sec) 3   RR Total (Breaths/Min) 300   Tidal Volume (mL) 594   VE Minute Ventilation (L/min) 17.9 L/min   Peak Inspiratory Pressure (cm H2O) 10   TiTOT (%) 35   Total Leak (L/Min) 9   Patient Trigger - ST Mode Only (%) 100   CPAP/BiPAP Alarms   High Pressure (cm H2O) 50   Low Pressure (cm H2O) 10   Low Pressure Delay (Sec) 20   Minute Ventilation (L/Min) 2   High RR (breaths/min) 50   Low RR (breaths/min) 10   Apnea (Sec) 20   Education   $ Education 15 min;BiPAP   Respiratory Evaluation   $ Care Plan Tech Time 15 min   $ Eval/Re-eval Charges Evaluation     Pt is still very tachypneic. MD notified

## 2022-11-16 NOTE — ANESTHESIA PROCEDURE NOTES
Arterial    Diagnosis: hemodynamic instability    Patient location during procedure: ICU  Procedure start time: 11/16/2022 9:30 AM  Timeout: 11/16/2022 9:30 AM  Procedure end time: 11/16/2022 9:43 AM    Staffing  Authorizing Provider: Jean Paul Wheeler MD  Performing Provider: Jean Paul Wheeler MD    Anesthesiologist was present at the time of the procedure.    Preanesthetic Checklist  Completed: patient identified, risks and benefits discussed, monitors and equipment checked, timeout performed and anesthesia consent givenArterial  Skin Prep: chlorhexidine gluconate  Local Infiltration: lidocaine  Orientation: right  Location: radial    Catheter Size: 20 G  Catheter placement by Ultrasound guidance. Heme positive aspiration all ports.   Vessel Caliber: small, patent, compressibility poor  Vascular Doppler:  not done  Needle advanced into vessel with real time Ultrasound guidance.  Sterile sheath used.Insertion Attempts: 1  Assessment  Dressing: sutured in place and taped and tegaderm  Patient: Tolerated well

## 2022-11-16 NOTE — CARE UPDATE
11/15/22 2300   Patient Assessment/Suction   Level of Consciousness (AVPU) responds to voice   Respiratory Effort Mild   Expansion/Accessory Muscles/Retractions no retractions   Rhythm/Pattern, Respiratory assisted mechanically   Cough Frequency infrequent   Cough Type assisted   PRE-TX-O2   O2 Device (Oxygen Therapy) BiPAP   $ Is the patient on Low Flow Oxygen? Yes   Oxygen Concentration (%) 40   SpO2 98 %   Pulse Oximetry Type Continuous   $ Pulse Oximetry - Single Charge Pulse Oximetry - Single   Pulse (!) 119   Resp (!) 34   /77   Ready to Wean/Extubation Screen   FIO2<=50 (chart decimal) 0.4   Preset CPAP/BiPAP Settings   Mode Of Delivery BiPAP S/T   $ CPAP/BiPAP Daily Charge BiPAP/CPAP Daily   $ Initial CPAP/BiPAP Setup? Yes   $ Is patient using? Yes   Size of Mask Medium/Large   Sized Appropriately? Yes   Equipment Type V60   Airway Device Type medium full face mask   Humidifier not applicable   Ipap 15   EPAP (cm H2O) 5   Pressure Support (cm H2O) 10   Set Rate (Breaths/Min) 15   ITime (sec) 0.8   Rise Time (sec) 2   Patient CPAP/BiPAP Settings   FiO2 Auto Set other (see comments)   Timed Inspiration (Sec) 0.8   IPAP Rise Time (sec) 2   RR Total (Breaths/Min) 30   Tidal Volume (mL) 651   VE Minute Ventilation (L/min) 19.3 L/min   Peak Inspiratory Pressure (cm H2O) 16   TiTOT (%) 29   Total Leak (L/Min) 17   Patient Trigger - ST Mode Only (%) 89   CPAP/BiPAP Alarms   High Pressure (cm H2O) 50   Low Pressure (cm H2O) 10   Low Pressure Delay (Sec) 20   Minute Ventilation (L/Min) 2   High RR (breaths/min) 50   Low RR (breaths/min) 10   Apnea (Sec) 20   Education   $ Education 15 min;BiPAP   Respiratory Evaluation   $ Care Plan Tech Time 15 min   $ Eval/Re-eval Charges Evaluation   Evaluation For New Orders

## 2022-11-16 NOTE — PROGRESS NOTES
Called by RN to report tachycardia to 140 with EKG read stating SVT.  I suspected the p wave was being buried in the T wave.  IV lopressor given and decreased to 110 on next EKG and reveals this is sinus tachycardia.  Per chart review, worsening CXR with increased O2 needs.  I suspect respiratory state is driving his tachycardia.    Addendum 11/16, 0054  Placed on bipap as dropped saturations to 85% and required 15L NC. Tachypnea.  Respiratory therapist reports change in mental status compared to when she first admitted him.  ABG done after a period on bipap and it appears he is blowing off his CO2. If he shows signs of tiring, low threshold for intubation. Orders reviewed and CXR ordered for this AM.  Yesterday's cxr with worsening infiltrates. Echo reviewed and normal EF, indeterminate LV diastolic function, estimated PAP 45.

## 2022-11-16 NOTE — SIGNIFICANT EVENT
Since early morning patient continued to decline, we broaden antibiotic coverage, started Levophed. We called anesthesia who placed central line, intubated him, while putting arterial line we lost pulse and started CPR.  Overhead Code blue was called. He received multiple rounds of CPR, epinephrine pushes as well as bicarb pushes.  Family was already in waiting room before code blue started.  I spoke with family in person during code blue and they all expressed that we should stop CPR and let him go peacefully.  Code status was changed to DNR in Epic.  After stopping resuscitation patient immediately succumbed.     Upon my final physical exam:  Patient has no palpable pulse, no measurable blood pressure, no spontaneous respiration, no heart sound was appreciated on 1 minute of auscultation.  Pupils are bilaterally dilated and fixed.     Patient was pronounced  at 10:07 a.m. on 2022.  Later we brought family in the room including wife, daughter and other family member.  I answered all their questions and offered my condolences.

## 2022-11-17 NOTE — DISCHARGE SUMMARY
[facility]  Discharge Summary      Patient Name: Bret Garcia  MRN: [id number]  ELIAZAR: [id number]  Patient Class: IP- Inpatient  Admission Date: 11/14/2022  Hospital Length of Stay: 2 days  Discharge Date and Time: 11/16/2022  2:39 PM  Attending Physician: No att. providers found   Discharging Provider: Gabriele Prabhakar MD  Primary Care Provider: Tab Cutler MD    Primary Care Team: Networked reference to record PCT     HPI:   77-year-old gentleman with prior history of diabetes, hypertension, GERD, recent diagnosis of Lewy body dementia brought emergency room due to frequent falls.  According to wife for last 1 week patient is coughing productive sputum, very lethargic does not want to come out of bed, has extremely poor p.o. intake, he fell couple times and at times appears somnolent and confused.  Today when patient fell he started bleeding from scalp and was very lethargic and was eventually brought emergency room.  She also endorses cough, fever, lethargy, poor p.o. intake and frequent falls.  She tells me patient recently was diagnosed with Lewy body dementia.  Wife expressed that couple weeks ago he was working in yard and in last 1 week he has deconditioned significantly.  Otherwise denies any headache, dizziness, chest pain, palpitations, nausea, vomiting, bladder or bowel symptoms.     Upon arrival to ED patient was in significant distress, hypoxic, tachycardic.  Extensive imaging did not show any long bone fractures, CT chest raised concern of left lung pneumonia, was found to have sepsis, received initial fluid bolus, Rocephin however upon my interview he was not on any continuous IV fluids, blood sugar was 580 however he was not given any insulin in hope that sugar will come down with IV fluids.  His electrolytes were generously replaced.  No repeat lactic acid, Accu-Cheks, COVID test yet. Hospital Medicine was consulted for admission      * No surgery found *       Hospital Course:   77-year-old gentleman with prior history of diabetes, hypertension, Lewy body dementia was brought to emergency room after encountering fall, according to wife for last 1 week he has severely deconditioned, coughing, falling frequent and has extremely poor p.o. intake and had fever at home.  During his hospital stay patient was treated for sepsis with aggressive IV hydration, antibiotics.  He recovered well yesterday and came out of sepsis.  Unfortunately since today early morning he was more tachycardic, more hypoxic.  In spite of our best efforts he continued to decline in the morning, we started broad-spectrum antibiotics, intubated, central line and arterial lines were placed but patient coded, received multiple rounds of CPR, bicarb pushes.  Intermittently ROSC was achieved however not sustained. I spoke with family in person during code blue and they all expressed that we should stop CPR and let him go peacefully.  Code status was changed to DNR in Epic.  After stopping resuscitation patient immediately succumbed.      Upon my final physical exam:  Patient has no palpable pulse, no measurable blood pressure, no spontaneous respiration, no heart sound was appreciated on 1 minute of auscultation.  Pupils are bilaterally dilated and fixed.      Patient was pronounced  at 10:07 a.m. on 2022.  Later we brought family in the room including wife, daughter and other family member.  I answered all their questions and offered my condolences.        Goals of Care Treatment Preferences:  Code Status: DNR      Consults:   Consults (From admission, onward)        Status Ordering Provider     Inpatient consult to Pulmonology  Once        Provider:  Jennifer Desouza MD    Completed CARRI DESAI     Inpatient consult to Pulmonology  Once        Provider:  Jean Paul Noel MD    Completed CARRI DESAI     Inpatient consult to Infectious Diseases  Once        Provider:  Sully  Eloina Dey MD    Completed CARRI DESAI     Inpatient consult to Registered Dietitian/Nutritionist  Once        Provider:  (Not yet assigned)    Completed CARRI DESAI            Final Active Diagnoses:    Diagnosis Date Noted POA    Left lung Pneumonia [J18.9] 2022 Yes    NICOLE (acute kidney injury) [N17.9] 2017 Yes    Hyponatremia [E87.1] 2022 Yes    Thrombocytopenia [D69.6] 2022 Yes    Frequent falls [R29.6] 2022 Not Applicable    Elevated troponin [R77.8] 2022 Yes    Sepsis with acute renal failure without septic shock [A41.9, R65.20, N17.9] 2022 Yes    Fall [W19.XXXA] 11/15/2022 Yes    Major neurocognitive disorder [F03.90] 2022 Yes    Gait disturbance [R26.9] 2021 Yes    Acute hypoxemic respiratory failure [J96.01] 2020 Yes    Type 2 diabetes mellitus with obesity [E11.69, E66.9] 2017 Yes    History of prostate cancer [Z85.46] 2013 Not Applicable    History of colon cancer [Z85.038] 2013 Yes      Problems Resolved During this Admission:    Diagnosis Date Noted Date Resolved POA    PRINCIPAL PROBLEM:  Sepsis [A41.9] 2022 11/15/2022 Yes    Hyperglycemia [R73.9] 2022 11/15/2022 Yes    Hypomagnesemia [E83.42] 2022 11/15/2022 Yes       Discharged Condition:     Disposition:     Significant Diagnostic Studies: Labs: All labs within the past 24 hours have been reviewed    Pending Diagnostic Studies:     Procedure Component Value Units Date/Time    Brain natriuretic peptide [[id number]] Collected: 22 1220    Order Status: Sent Lab Status: In process Updated: 22 1236    Specimen: Blood            Indwelling Lines/Drains at time of discharge:   Lines/Drains/Airways     Central Venous Catheter Line  Duration           Percutaneous Central Line Insertion/Assessment - Triple Lumen  22 1205 right internal jugular 1 day          Airway  Duration                Airway -  Non-Surgical 11/16/22 0909 1 day                Time spent on the discharge of patient: 65 minutes         Gabriele Prabhakar MD  Department of Hospital Medicine  [facility]

## 2022-11-17 NOTE — HOSPITAL COURSE
77-year-old gentleman with prior history of diabetes, hypertension, Lewy body dementia was brought to emergency room after encountering fall, according to wife for last 1 week he has severely deconditioned, coughing, falling frequent and has extremely poor p.o. intake and had fever at home.  During his hospital stay patient was treated for sepsis with aggressive IV hydration, antibiotics.  He recovered well yesterday and came out of sepsis.  Unfortunately since today early morning he was more tachycardic, more hypoxic.  In spite of our best efforts he continued to decline in the morning, we started broad-spectrum antibiotics, intubated, central line and arterial lines were placed but patient coded, received multiple rounds of CPR, bicarb pushes.  Intermittently ROSC was achieved however not sustained. I spoke with family in person during code blue and they all expressed that we should stop CPR and let him go peacefully.  Code status was changed to DNR in Epic.  After stopping resuscitation patient immediately succumbed.      Upon my final physical exam:  Patient has no palpable pulse, no measurable blood pressure, no spontaneous respiration, no heart sound was appreciated on 1 minute of auscultation.  Pupils are bilaterally dilated and fixed.      Patient was pronounced  at 10:07 a.m. on 2022.  Later we brought family in the room including wife, daughter and other family member.  I answered all their questions and offered my condolences.

## 2022-11-17 NOTE — NURSING
MD made aware of low blood pressure and increased respiratory rate while on bipap.  MD now at bedside assessing patient and ordered to start levophed.  See chart for new orders.

## 2022-11-19 LAB — BACTERIA BLD CULT: NORMAL

## 2022-12-05 ENCOUNTER — TELEPHONE (OUTPATIENT)
Dept: PSYCHIATRY | Facility: CLINIC | Age: 77
End: 2022-12-05
Payer: MEDICARE

## 2022-12-05 NOTE — TELEPHONE ENCOUNTER
Patient's wife called to inform the provider of the patient's recent passing. She requests a call from the provider if possible.

## 2022-12-05 NOTE — TELEPHONE ENCOUNTER
Spoke with patient's wife on the phone. Expressed our condolences in regards to his passing. She would like to see me for a visit so we will get her scheduled.

## 2022-12-15 ENCOUNTER — ANESTHESIA EVENT (OUTPATIENT)
Dept: ANESTHESIOLOGY | Facility: HOSPITAL | Age: 77
End: 2022-12-15

## 2022-12-15 ENCOUNTER — ANESTHESIA (OUTPATIENT)
Dept: ANESTHESIOLOGY | Facility: HOSPITAL | Age: 77
End: 2022-12-15

## 2022-12-15 NOTE — ADDENDUM NOTE
Addendum  created 12/15/22 1529 by Jean Paul Wheeler MD    Delete clinical note, Intraprocedure Blocks edited, LDA deleted via procedure documentation, Order Canceled from Note

## 2022-12-15 NOTE — ANESTHESIA PROCEDURE NOTES
Ad Hoc Intubation    Date/Time: 11/16/2022 9:10 PM  Performed by: Jean Paul Wheeler MD  Authorized by: Jean Paul Wheeler MD     Indications:  Respiratory distress  Diagnosis:  Resp failure  Patient Location:  ICU  Timeout:  11/16/2022 9:09 AM  Procedure Start Time:  11/16/2022 9:09 AM  Procedure End Time:  11/16/2022 9:13 PM  Staff:     Anesthesiologist Present: Yes    Intubation:     Intubated:  Postinduction    Mask Ventilation:  Not attempted    Attempts:  1    Attempted By:  Staff anesthesiologist    Method of Intubation:  Video laryngoscopy    Blade:  Glidescope 4    Laryngeal View Grade: Grade I - full view of chords      Difficult Airway Encountered?: No      Complications:  None    Airway Device:  Oral endotracheal tube    Airway Device Size:  8.0    Style/Cuff Inflation:  Cuffed (inflated to minimal occlusive pressure)    Inflation Amount (mL):  7    Tube secured:  21    Secured at:  The teeth    Placement Verified By:  Chest x-ray and Colorimetric ETCO2 device    Complicating Factors:  None    Findings Post-Intubation:  BS equal bilateral and atraumatic/condition of teeth unchanged

## 2024-10-30 NOTE — TELEPHONE ENCOUNTER
----- Message from Kailee Briceno sent at 3/6/2019  9:35 AM CST -----  Contact: patient  Type: Needs Medical Advice    Who Called:  patient  Symptoms (please be specific):    How long has patient had these symptoms:    Pharmacy name and phone #:  [facility] [phone]  Best Call Back Number: [phone]  Additional Information: requesting a call back to discuss medication myrbetriq is not working    
Call placed to Gretel Bret in regards to message left. No answer, then went to a busy signal. Will continue to call.   
I spoke with the pt and advised him he can try Myrbetriq 50 because 25 mg is not working. He verbalized understanding.   
Left message for the pt to return call.  
Please advise.  
I have personally seen and examined the patient. I have collaborated with and supervised the